# Patient Record
Sex: MALE | Race: WHITE | NOT HISPANIC OR LATINO | Employment: OTHER | URBAN - METROPOLITAN AREA
[De-identification: names, ages, dates, MRNs, and addresses within clinical notes are randomized per-mention and may not be internally consistent; named-entity substitution may affect disease eponyms.]

---

## 2018-01-19 ENCOUNTER — OFFICE VISIT CONVERTED (OUTPATIENT)
Dept: UROLOGY | Facility: CLINIC | Age: 83
End: 2018-01-19
Attending: UROLOGY

## 2018-01-23 ENCOUNTER — CONVERSION ENCOUNTER (OUTPATIENT)
Dept: SURGERY | Facility: CLINIC | Age: 83
End: 2018-01-23

## 2018-01-23 ENCOUNTER — OFFICE VISIT CONVERTED (OUTPATIENT)
Dept: SURGERY | Facility: CLINIC | Age: 83
End: 2018-01-23
Attending: SURGERY

## 2018-02-21 ENCOUNTER — OFFICE VISIT CONVERTED (OUTPATIENT)
Dept: SURGERY | Facility: CLINIC | Age: 83
End: 2018-02-21
Attending: SURGERY

## 2018-07-19 ENCOUNTER — OFFICE VISIT CONVERTED (OUTPATIENT)
Dept: UROLOGY | Facility: CLINIC | Age: 83
End: 2018-07-19
Attending: UROLOGY

## 2018-10-31 ENCOUNTER — CONVERSION ENCOUNTER (OUTPATIENT)
Dept: SURGERY | Facility: CLINIC | Age: 83
End: 2018-10-31

## 2018-10-31 ENCOUNTER — OFFICE VISIT CONVERTED (OUTPATIENT)
Dept: SURGERY | Facility: CLINIC | Age: 83
End: 2018-10-31
Attending: SURGERY

## 2018-11-07 ENCOUNTER — OFFICE VISIT CONVERTED (OUTPATIENT)
Dept: ORTHOPEDIC SURGERY | Facility: CLINIC | Age: 83
End: 2018-11-07
Attending: ORTHOPAEDIC SURGERY

## 2018-12-20 ENCOUNTER — OFFICE VISIT CONVERTED (OUTPATIENT)
Dept: SURGERY | Facility: CLINIC | Age: 83
End: 2018-12-20
Attending: SURGERY

## 2019-01-18 ENCOUNTER — CONVERSION ENCOUNTER (OUTPATIENT)
Dept: OTHER | Facility: HOSPITAL | Age: 84
End: 2019-01-18

## 2019-01-18 ENCOUNTER — OFFICE VISIT CONVERTED (OUTPATIENT)
Dept: OTHER | Facility: HOSPITAL | Age: 84
End: 2019-01-18
Attending: NURSE PRACTITIONER

## 2019-01-21 ENCOUNTER — CONVERSION ENCOUNTER (OUTPATIENT)
Dept: UROLOGY | Facility: CLINIC | Age: 84
End: 2019-01-21

## 2019-01-21 ENCOUNTER — OFFICE VISIT CONVERTED (OUTPATIENT)
Dept: UROLOGY | Facility: CLINIC | Age: 84
End: 2019-01-21
Attending: UROLOGY

## 2019-05-10 ENCOUNTER — HOSPITAL ENCOUNTER (OUTPATIENT)
Dept: OTHER | Facility: HOSPITAL | Age: 84
Discharge: HOME | End: 2019-05-10
Attending: NURSE PRACTITIONER

## 2019-05-10 ENCOUNTER — CONVERSION ENCOUNTER (OUTPATIENT)
Dept: OTHER | Facility: HOSPITAL | Age: 84
End: 2019-05-10

## 2019-05-10 ENCOUNTER — OFFICE VISIT CONVERTED (OUTPATIENT)
Dept: OTHER | Facility: HOSPITAL | Age: 84
End: 2019-05-10
Attending: NURSE PRACTITIONER

## 2019-05-10 LAB
ALBUMIN SERPL-MCNC: 3.6 G/DL (ref 3.5–5)
ALBUMIN/GLOB SERPL: 1.3 {RATIO} (ref 1.4–2.6)
ALP SERPL-CCNC: 95 U/L (ref 56–155)
ALT SERPL-CCNC: 13 U/L (ref 10–40)
ANION GAP SERPL CALC-SCNC: 16 MMOL/L (ref 8–19)
AST SERPL-CCNC: 22 U/L (ref 15–50)
BASOPHILS # BLD AUTO: 0.03 10*3/UL (ref 0–0.2)
BASOPHILS NFR BLD AUTO: 0.5 % (ref 0–3)
BILIRUB SERPL-MCNC: 0.25 MG/DL (ref 0.2–1.3)
BUN SERPL-MCNC: 19 MG/DL (ref 5–25)
BUN/CREAT SERPL: 20 {RATIO} (ref 6–20)
CALCIUM SERPL-MCNC: 9.2 MG/DL (ref 8.7–10.4)
CHLORIDE SERPL-SCNC: 102 MMOL/L (ref 99–111)
CHOLEST SERPL-MCNC: 135 MG/DL (ref 107–200)
CHOLEST/HDLC SERPL: 3 {RATIO} (ref 3–6)
CONV ABS IMM GRAN: 0.02 10*3/UL (ref 0–0.2)
CONV CO2: 24 MMOL/L (ref 22–32)
CONV IMMATURE GRAN: 0.4 % (ref 0–1.8)
CONV TOTAL PROTEIN: 6.3 G/DL (ref 6.3–8.2)
CREAT UR-MCNC: 0.95 MG/DL (ref 0.7–1.2)
DEPRECATED RDW RBC AUTO: 42.5 FL (ref 35.1–43.9)
EOSINOPHIL # BLD AUTO: 0.11 10*3/UL (ref 0–0.7)
EOSINOPHIL # BLD AUTO: 2 % (ref 0–7)
ERYTHROCYTE [DISTWIDTH] IN BLOOD BY AUTOMATED COUNT: 12.3 % (ref 11.6–14.4)
FOLATE SERPL-MCNC: >20 NG/ML (ref 4.8–20)
GFR SERPLBLD BASED ON 1.73 SQ M-ARVRAT: >60 ML/MIN/{1.73_M2}
GLOBULIN UR ELPH-MCNC: 2.7 G/DL (ref 2–3.5)
GLUCOSE SERPL-MCNC: 97 MG/DL (ref 70–99)
HBA1C MFR BLD: 12.4 G/DL (ref 14–18)
HCT VFR BLD AUTO: 37.8 % (ref 42–52)
HDLC SERPL-MCNC: 45 MG/DL (ref 40–60)
LDLC SERPL CALC-MCNC: 78 MG/DL (ref 70–100)
LYMPHOCYTES # BLD AUTO: 1.73 10*3/UL (ref 1–5)
MCH RBC QN AUTO: 31.1 PG (ref 27–31)
MCHC RBC AUTO-ENTMCNC: 32.8 G/DL (ref 33–37)
MCV RBC AUTO: 94.7 FL (ref 80–96)
MONOCYTES # BLD AUTO: 0.41 10*3/UL (ref 0.2–1.2)
MONOCYTES NFR BLD AUTO: 7.3 % (ref 3–10)
NEUTROPHILS # BLD AUTO: 3.32 10*3/UL (ref 2–8)
NEUTROPHILS NFR BLD AUTO: 59 % (ref 30–85)
NRBC CBCN: 0 % (ref 0–0.7)
OSMOLALITY SERPL CALC.SUM OF ELEC: 288 MOSM/KG (ref 273–304)
PLATELET # BLD AUTO: 210 10*3/UL (ref 130–400)
PMV BLD AUTO: 11 FL (ref 9.4–12.4)
POTASSIUM SERPL-SCNC: 4 MMOL/L (ref 3.5–5.3)
PSA SERPL-MCNC: 1.98 NG/ML (ref 0–4)
RBC # BLD AUTO: 3.99 10*6/UL (ref 4.7–6.1)
SODIUM SERPL-SCNC: 138 MMOL/L (ref 135–147)
TRIGL SERPL-MCNC: 62 MG/DL (ref 40–150)
VARIANT LYMPHS NFR BLD MANUAL: 30.8 % (ref 20–45)
VIT B12 SERPL-MCNC: 404 PG/ML (ref 211–911)
VLDLC SERPL-MCNC: 12 MG/DL (ref 5–37)
WBC # BLD AUTO: 5.62 10*3/UL (ref 4.8–10.8)

## 2019-05-14 ENCOUNTER — HOSPITAL ENCOUNTER (OUTPATIENT)
Dept: OTHER | Facility: HOSPITAL | Age: 84
Discharge: HOME | End: 2019-05-14
Attending: NURSE PRACTITIONER

## 2019-05-14 LAB
FERRITIN SERPL-MCNC: 53 NG/ML (ref 30–300)
IRON SATN MFR SERPL: 30 % (ref 20–55)
IRON SERPL-MCNC: 97 UG/DL (ref 70–180)
TIBC SERPL-MCNC: 319 UG/DL (ref 245–450)
TRANSFERRIN SERPL-MCNC: 223 MG/DL (ref 215–365)

## 2020-02-20 ENCOUNTER — OFFICE VISIT (OUTPATIENT)
Dept: CARDIOLOGY | Facility: CLINIC | Age: 85
End: 2020-02-20

## 2020-02-20 ENCOUNTER — HOSPITAL ENCOUNTER (OUTPATIENT)
Dept: LAB | Facility: HOSPITAL | Age: 85
Discharge: HOME | End: 2020-02-20
Attending: PHYSICIAN ASSISTANT

## 2020-02-20 VITALS
HEART RATE: 70 BPM | DIASTOLIC BLOOD PRESSURE: 72 MMHG | BODY MASS INDEX: 23.52 KG/M2 | HEIGHT: 71 IN | WEIGHT: 168 LBS | OXYGEN SATURATION: 99 % | SYSTOLIC BLOOD PRESSURE: 140 MMHG

## 2020-02-20 DIAGNOSIS — R06.89 RESPIRATORY INSUFFICIENCY: ICD-10-CM

## 2020-02-20 DIAGNOSIS — I10 ESSENTIAL HYPERTENSION: Primary | ICD-10-CM

## 2020-02-20 DIAGNOSIS — R60.0 BILATERAL LOWER EXTREMITY EDEMA: ICD-10-CM

## 2020-02-20 LAB
25(OH)D3 SERPL-MCNC: 36.3 NG/ML (ref 30–100)
ALBUMIN SERPL-MCNC: 3.6 G/DL (ref 3.5–5)
ALBUMIN/GLOB SERPL: 1.3 {RATIO} (ref 1.4–2.6)
ALP SERPL-CCNC: 107 U/L (ref 56–155)
ALT SERPL-CCNC: 13 U/L (ref 10–40)
ANION GAP SERPL CALC-SCNC: 18 MMOL/L (ref 8–19)
AST SERPL-CCNC: 23 U/L (ref 15–50)
BASOPHILS # BLD AUTO: 0.04 10*3/UL (ref 0–0.2)
BASOPHILS NFR BLD AUTO: 0.7 % (ref 0–3)
BILIRUB SERPL-MCNC: 0.31 MG/DL (ref 0.2–1.3)
BUN SERPL-MCNC: 22 MG/DL (ref 5–25)
BUN/CREAT SERPL: 22 {RATIO} (ref 6–20)
CALCIUM SERPL-MCNC: 9.3 MG/DL (ref 8.7–10.4)
CHLORIDE SERPL-SCNC: 101 MMOL/L (ref 99–111)
CHOLEST SERPL-MCNC: 160 MG/DL (ref 107–200)
CHOLEST/HDLC SERPL: 3.4 {RATIO} (ref 3–6)
CONV ABS IMM GRAN: 0.02 10*3/UL (ref 0–0.2)
CONV CO2: 26 MMOL/L (ref 22–32)
CONV IMMATURE GRAN: 0.4 % (ref 0–1.8)
CONV TOTAL PROTEIN: 6.4 G/DL (ref 6.3–8.2)
CREAT UR-MCNC: 1 MG/DL (ref 0.7–1.2)
DEPRECATED RDW RBC AUTO: 43.7 FL (ref 35.1–43.9)
EOSINOPHIL # BLD AUTO: 0.23 10*3/UL (ref 0–0.7)
EOSINOPHIL # BLD AUTO: 4.3 % (ref 0–7)
ERYTHROCYTE [DISTWIDTH] IN BLOOD BY AUTOMATED COUNT: 12.7 % (ref 11.6–14.4)
GFR SERPLBLD BASED ON 1.73 SQ M-ARVRAT: >60 ML/MIN/{1.73_M2}
GLOBULIN UR ELPH-MCNC: 2.8 G/DL (ref 2–3.5)
GLUCOSE SERPL-MCNC: 103 MG/DL (ref 70–99)
HCT VFR BLD AUTO: 40.7 % (ref 42–52)
HDLC SERPL-MCNC: 47 MG/DL (ref 40–60)
HGB BLD-MCNC: 13.1 G/DL (ref 14–18)
LDLC SERPL CALC-MCNC: 106 MG/DL (ref 70–100)
LYMPHOCYTES # BLD AUTO: 1.56 10*3/UL (ref 1–5)
LYMPHOCYTES NFR BLD AUTO: 29 % (ref 20–45)
MCH RBC QN AUTO: 30.6 PG (ref 27–31)
MCHC RBC AUTO-ENTMCNC: 32.2 G/DL (ref 33–37)
MCV RBC AUTO: 95.1 FL (ref 80–96)
MONOCYTES # BLD AUTO: 0.44 10*3/UL (ref 0.2–1.2)
MONOCYTES NFR BLD AUTO: 8.2 % (ref 3–10)
NEUTROPHILS # BLD AUTO: 3.09 10*3/UL (ref 2–8)
NEUTROPHILS NFR BLD AUTO: 57.4 % (ref 30–85)
NRBC CBCN: 0 % (ref 0–0.7)
OSMOLALITY SERPL CALC.SUM OF ELEC: 296 MOSM/KG (ref 273–304)
PLATELET # BLD AUTO: 197 10*3/UL (ref 130–400)
PMV BLD AUTO: 10.6 FL (ref 9.4–12.4)
POTASSIUM SERPL-SCNC: 4 MMOL/L (ref 3.5–5.3)
RBC # BLD AUTO: 4.28 10*6/UL (ref 4.7–6.1)
SODIUM SERPL-SCNC: 141 MMOL/L (ref 135–147)
T4 FREE SERPL-MCNC: 1 NG/DL (ref 0.9–1.8)
TRIGL SERPL-MCNC: 33 MG/DL (ref 40–150)
TSH SERPL-ACNC: 7.16 M[IU]/L (ref 0.27–4.2)
VLDLC SERPL-MCNC: 7 MG/DL (ref 5–37)
WBC # BLD AUTO: 5.38 10*3/UL (ref 4.8–10.8)

## 2020-02-20 PROCEDURE — 99203 OFFICE O/P NEW LOW 30 MIN: CPT | Performed by: INTERNAL MEDICINE

## 2020-02-20 RX ORDER — HYDROCHLOROTHIAZIDE 25 MG/1
TABLET ORAL
COMMUNITY
Start: 2019-12-23 | End: 2021-07-19 | Stop reason: SDUPTHER

## 2020-02-20 RX ORDER — MELOXICAM 7.5 MG/1
7.5 TABLET ORAL DAILY PRN
COMMUNITY
Start: 2019-11-25 | End: 2021-08-10 | Stop reason: SDUPTHER

## 2020-02-20 NOTE — PROGRESS NOTES
[facility]        Subjective:     Encounter Date:02/20/2020      Patient ID: Payam Owens is a 85 y.o. male.    Chief Complaint:  Chief Complaint   Patient presents with   • Foot Swelling     NP - left ankle       HPI: I saw Payam Owens today for cardiovascular evaluation.  He is a very pleasant 85-year-old male.  He does not report any prior known cardiac disease history.  He has a good activity level which is remained stable.  He exercises aerobically on a routine basis.  He does not experience chest pain pressure or tightness.  He does report dyspnea on exertion but he feels that this is also remained stable.  He is free of orthopnea or PND.  He is noticed however lower extremity edema left greater than right.  He denies syncope near syncope or palpitation.  Mr. Owens does not report any known injury or surgery to his lower extremities.  Cardiac risk factors are positive for hypertension negative for diabetes dyslipidemia tobacco use or family history of premature coronary heart disease.      The following portions of the patient's history were reviewed and updated as appropriate: allergies, current medications, past family history, past medical history, past social history, past surgical history and problem list.    Problem List:  There is no problem list on file for this patient.      Past Medical History:  No past medical history on file.    Past Surgical History:  No past surgical history on file.    Social History:  Social History     Socioeconomic History   • Marital status: Unknown     Spouse name: Not on file   • Number of children: Not on file   • Years of education: Not on file   • Highest education level: Not on file   Tobacco Use   • Smoking status: Never Smoker   Substance and Sexual Activity   • Alcohol use: Yes     Frequency: Monthly or less       Allergies:  No Known Allergies      ROS:  Review of Systems   Constitution: Negative for malaise/fatigue.   HENT: Negative for  "hearing loss and nosebleeds.    Eyes: Negative for double vision and visual disturbance.   Cardiovascular: Positive for dyspnea on exertion and leg swelling. Negative for chest pain, claudication, near-syncope, orthopnea, palpitations, paroxysmal nocturnal dyspnea and syncope.   Respiratory: Negative for cough, shortness of breath, sleep disturbances due to breathing, snoring, sputum production and wheezing.    Endocrine: Negative for cold intolerance, heat intolerance, polydipsia and polyuria.   Hematologic/Lymphatic: Negative for adenopathy and bleeding problem. Does not bruise/bleed easily.   Skin: Negative for flushing and itching.   Musculoskeletal: Negative for back pain, falls, joint pain, joint swelling, muscle weakness and neck pain.   Gastrointestinal: Negative for abdominal pain, dysphagia, heartburn, nausea and vomiting.   Genitourinary: Negative for dysuria and frequency.   Neurological: Negative for disturbances in coordination, dizziness, light-headedness, loss of balance, numbness and weakness.   Psychiatric/Behavioral: Negative for altered mental status and memory loss. The patient is not nervous/anxious.    Allergic/Immunologic: Negative for hives and persistent infections.          Objective:         /72 (BP Location: Left arm, Patient Position: Sitting, Cuff Size: Adult)   Pulse 70   Ht 179.1 cm (70.5\")   Wt 76.2 kg (168 lb)   SpO2 99%   BMI 23.76 kg/m²     Physical Exam   Constitutional: He is oriented to person, place, and time. He appears well-developed and well-nourished.   HENT:   Head: Normocephalic and atraumatic.   Eyes: Pupils are equal, round, and reactive to light. Conjunctivae and EOM are normal.   Neck: Neck supple. No thyromegaly present.   Cardiovascular: Normal rate, regular rhythm, S1 normal, S2 normal and intact distal pulses. PMI is not displaced. Exam reveals gallop and S4. Exam reveals no S3, no distant heart sounds, no friction rub, no midsystolic click and no " opening snap.   No murmur heard.  Pulses:       Carotid pulses are 2+ on the right side, and 2+ on the left side.       Radial pulses are 2+ on the right side, and 2+ on the left side.        Femoral pulses are 2+ on the right side, and 2+ on the left side.       Dorsalis pedis pulses are 2+ on the right side, and 2+ on the left side.        Posterior tibial pulses are 2+ on the right side, and 2+ on the left side.   Pulmonary/Chest: Effort normal and breath sounds normal. No respiratory distress. He has no wheezes. He has no rales.   Abdominal: Soft. Bowel sounds are normal. He exhibits no distension and no mass. There is no tenderness.   Musculoskeletal: Normal range of motion. He exhibits no edema.   Trace to 1+ edema left greater than right lower extremity   Neurological: He is alert and oriented to person, place, and time.   Skin: Skin is warm and dry. No erythema.   Psychiatric: He has a normal mood and affect.       In-Office Procedure(s):  Procedures    ASCVD RIsk Score::  The ASCVD Risk score (Redding DC Jr., et al., 2013) failed to calculate for the following reasons:    The 2013 ASCVD risk score is only valid for ages 40 to 79    Recent Radiology:  Imaging Results (Most Recent)     None                Assessment/Plan:         1. Essential hypertension  Target less than 130/80    2. Respiratory insufficiency  Multifactorial including age weight deconditioning hypertension    3. Bilateral lower extremity edema  Persistent    I recommended target blood pressure of less than 130/80.  I have discussed the importance of salt restriction is close to 2000 mg a day as possible.  I will obtain an echocardiogram to assess cardiac chamber size, wall thickness, contractility, possible diastolic dysfunction and valvular function.  I have encouraged Mr. Owens to maintain his exercise program.           Collin Harvey MD  02/20/20  .

## 2020-05-29 ENCOUNTER — HOSPITAL ENCOUNTER (OUTPATIENT)
Dept: CARDIOLOGY | Facility: HOSPITAL | Age: 85
Discharge: HOME | End: 2020-05-29
Admitting: INTERNAL MEDICINE

## 2020-05-29 VITALS
HEIGHT: 70 IN | DIASTOLIC BLOOD PRESSURE: 88 MMHG | SYSTOLIC BLOOD PRESSURE: 170 MMHG | BODY MASS INDEX: 24.05 KG/M2 | WEIGHT: 168 LBS

## 2020-05-29 DIAGNOSIS — R60.0 BILATERAL LOWER EXTREMITY EDEMA: ICD-10-CM

## 2020-05-29 DIAGNOSIS — I10 ESSENTIAL HYPERTENSION: ICD-10-CM

## 2020-05-29 DIAGNOSIS — R06.89 RESPIRATORY INSUFFICIENCY: ICD-10-CM

## 2020-05-29 PROCEDURE — 93306 TTE W/DOPPLER COMPLETE: CPT

## 2020-05-29 PROCEDURE — 93306 TTE W/DOPPLER COMPLETE: CPT | Performed by: INTERNAL MEDICINE

## 2020-06-01 LAB
AORTIC DIMENSIONLESS INDEX: 0.6 (DI)
BH CV ECHO MEAS - ACS: 1.7 CM
BH CV ECHO MEAS - AO MAX PG: 8 MMHG
BH CV ECHO MEAS - AO MEAN PG (FULL): 3 MMHG
BH CV ECHO MEAS - AO MEAN PG: 4 MMHG
BH CV ECHO MEAS - AO V2 MAX: 143 CM/SEC
BH CV ECHO MEAS - AO V2 MEAN: 86.9 CM/SEC
BH CV ECHO MEAS - AO V2 VTI: 28.9 CM
BH CV ECHO MEAS - AVA(I,A): 1.9 CM^2
BH CV ECHO MEAS - AVA(I,D): 1.9 CM^2
BH CV ECHO MEAS - BSA(HAYCOCK): 1.9 M^2
BH CV ECHO MEAS - BSA: 1.9 M^2
BH CV ECHO MEAS - BZI_BMI: 24.1 KILOGRAMS/M^2
BH CV ECHO MEAS - BZI_METRIC_HEIGHT: 177.8 CM
BH CV ECHO MEAS - BZI_METRIC_WEIGHT: 76.2 KG
BH CV ECHO MEAS - EDV(CUBED): 103.8 ML
BH CV ECHO MEAS - EDV(MOD-SP2): 102 ML
BH CV ECHO MEAS - EDV(MOD-SP4): 100 ML
BH CV ECHO MEAS - EDV(TEICH): 102.4 ML
BH CV ECHO MEAS - EF(CUBED): 74 %
BH CV ECHO MEAS - EF(MOD-BP): 66 %
BH CV ECHO MEAS - EF(MOD-SP2): 67.6 %
BH CV ECHO MEAS - EF(MOD-SP4): 69 %
BH CV ECHO MEAS - EF(TEICH): 65.8 %
BH CV ECHO MEAS - ESV(CUBED): 27 ML
BH CV ECHO MEAS - ESV(MOD-SP2): 33 ML
BH CV ECHO MEAS - ESV(MOD-SP4): 31 ML
BH CV ECHO MEAS - ESV(TEICH): 35 ML
BH CV ECHO MEAS - FS: 36.2 %
BH CV ECHO MEAS - IVS/LVPW: 1.2
BH CV ECHO MEAS - IVSD: 1.1 CM
BH CV ECHO MEAS - LAT PEAK E' VEL: 8 CM/SEC
BH CV ECHO MEAS - LV DIASTOLIC VOL/BSA (35-75): 51.6 ML/M^2
BH CV ECHO MEAS - LV MASS(C)D: 164.5 GRAMS
BH CV ECHO MEAS - LV MASS(C)DI: 84.8 GRAMS/M^2
BH CV ECHO MEAS - LV MAX PG: 2 MMHG
BH CV ECHO MEAS - LV MEAN PG: 1 MMHG
BH CV ECHO MEAS - LV SYSTOLIC VOL/BSA (12-30): 16 ML/M^2
BH CV ECHO MEAS - LV V1 MAX: 69 CM/SEC
BH CV ECHO MEAS - LV V1 MEAN: 49.3 CM/SEC
BH CV ECHO MEAS - LV V1 VTI: 17.2 CM
BH CV ECHO MEAS - LVIDD: 4.7 CM
BH CV ECHO MEAS - LVIDS: 3 CM
BH CV ECHO MEAS - LVLD AP2: 6.7 CM
BH CV ECHO MEAS - LVLD AP4: 6.6 CM
BH CV ECHO MEAS - LVLS AP2: 5.9 CM
BH CV ECHO MEAS - LVLS AP4: 5.2 CM
BH CV ECHO MEAS - LVOT AREA (M): 3.1 CM^2
BH CV ECHO MEAS - LVOT AREA: 3.1 CM^2
BH CV ECHO MEAS - LVOT DIAM: 2 CM
BH CV ECHO MEAS - LVPWD: 0.9 CM
BH CV ECHO MEAS - MED PEAK E' VEL: 7 CM/SEC
BH CV ECHO MEAS - MR MAX PG: 84.3 MMHG
BH CV ECHO MEAS - MR MAX VEL: 459 CM/SEC
BH CV ECHO MEAS - MV A DUR: 0.08 SEC
BH CV ECHO MEAS - MV A MAX VEL: 87.9 CM/SEC
BH CV ECHO MEAS - MV DEC SLOPE: 306 CM/SEC^2
BH CV ECHO MEAS - MV DEC TIME: 290 SEC
BH CV ECHO MEAS - MV E MAX VEL: 58.7 CM/SEC
BH CV ECHO MEAS - MV E/A: 0.67
BH CV ECHO MEAS - MV MEAN PG: 2 MMHG
BH CV ECHO MEAS - MV P1/2T MAX VEL: 85.3 CM/SEC
BH CV ECHO MEAS - MV P1/2T: 81.6 MSEC
BH CV ECHO MEAS - MV V2 MEAN: 60.4 CM/SEC
BH CV ECHO MEAS - MV V2 VTI: 24.6 CM
BH CV ECHO MEAS - MVA P1/2T LCG: 2.6 CM^2
BH CV ECHO MEAS - MVA(P1/2T): 2.7 CM^2
BH CV ECHO MEAS - MVA(VTI): 2.2 CM^2
BH CV ECHO MEAS - PA ACC SLOPE: 1126 CM/SEC^2
BH CV ECHO MEAS - PA ACC TIME: 0.08 SEC
BH CV ECHO MEAS - PA MAX PG: 3.3 MMHG
BH CV ECHO MEAS - PA PR(ACCEL): 43.5 MMHG
BH CV ECHO MEAS - PA V2 MAX: 90.2 CM/SEC
BH CV ECHO MEAS - RAP SYSTOLE: 3 MMHG
BH CV ECHO MEAS - RV MEAN PG: 1 MMHG
BH CV ECHO MEAS - RV V1 MEAN: 34.6 CM/SEC
BH CV ECHO MEAS - RV V1 VTI: 10.4 CM
BH CV ECHO MEAS - RVSP: 30.2 MMHG
BH CV ECHO MEAS - SI(CUBED): 39.6 ML/M^2
BH CV ECHO MEAS - SI(LVOT): 27.9 ML/M^2
BH CV ECHO MEAS - SI(MOD-SP2): 35.6 ML/M^2
BH CV ECHO MEAS - SI(MOD-SP4): 35.6 ML/M^2
BH CV ECHO MEAS - SI(TEICH): 34.8 ML/M^2
BH CV ECHO MEAS - SV(CUBED): 76.8 ML
BH CV ECHO MEAS - SV(LVOT): 54 ML
BH CV ECHO MEAS - SV(MOD-SP2): 69 ML
BH CV ECHO MEAS - SV(MOD-SP4): 69 ML
BH CV ECHO MEAS - SV(TEICH): 67.4 ML
BH CV ECHO MEAS - TAPSE (>1.6): 2.5 CM2
BH CV ECHO MEAS - TR MAX VEL: 261 CM/SEC
BH CV ECHO MEASUREMENTS AVERAGE E/E' RATIO: 7.83
BH CV VAS BP RIGHT ARM: NORMAL MMHG
BH CV XLRA - RV BASE: 3 CM
BH CV XLRA - RV LENGTH: 6 CM
BH CV XLRA - RV MID: 2 CM
BH CV XLRA - TDI S': 8 CM/SEC
LEFT ATRIUM VOLUME INDEX: 17 ML/M2
MAXIMAL PREDICTED HEART RATE: 137 BPM
STRESS TARGET HR: 116 BPM

## 2020-06-08 PROBLEM — I10 ESSENTIAL HYPERTENSION: Status: ACTIVE | Noted: 2020-06-08

## 2020-06-10 ENCOUNTER — TELEMEDICINE (OUTPATIENT)
Dept: CARDIOLOGY | Facility: CLINIC | Age: 85
End: 2020-06-10

## 2020-06-10 VITALS
BODY MASS INDEX: 23.48 KG/M2 | WEIGHT: 164 LBS | DIASTOLIC BLOOD PRESSURE: 85 MMHG | HEART RATE: 67 BPM | HEIGHT: 70 IN | SYSTOLIC BLOOD PRESSURE: 128 MMHG

## 2020-06-10 DIAGNOSIS — I10 ESSENTIAL HYPERTENSION: ICD-10-CM

## 2020-06-10 DIAGNOSIS — R60.0 BILATERAL LOWER EXTREMITY EDEMA: ICD-10-CM

## 2020-06-10 DIAGNOSIS — I35.0 AORTIC STENOSIS, MODERATE: Primary | ICD-10-CM

## 2020-06-10 DIAGNOSIS — R06.89 RESPIRATORY INSUFFICIENCY: ICD-10-CM

## 2020-06-10 DIAGNOSIS — Z01.818 PRE-OP TESTING: ICD-10-CM

## 2020-06-10 PROCEDURE — 99215 OFFICE O/P EST HI 40 MIN: CPT | Performed by: INTERNAL MEDICINE

## 2020-06-10 RX ORDER — FERROUS SULFATE 325(65) MG
325 TABLET ORAL AS NEEDED
COMMUNITY
End: 2021-05-20

## 2020-06-11 NOTE — PROGRESS NOTES
[facility]    You have chosen to receive care through a telehealth visit.  Do you consent to use a video/audio connection for your medical care today? Yes    Subjective:     Encounter Date:06/10/2020      Patient ID: Payam Owens is a 85 y.o. male.      HPI: Nathaly Owens agreed to a video visit today secondary to the coronavirus pandemic.  He remains free of fever cough but does report increased dyspnea on exertion.  He denies any change in his sense of smell or taste.  Mr. Owens is a pleasant 85-year-old male who reports increased dyspnea on exertion.  He has demonstrated lower extremity edema.  Obtain an echocardiogram on 5/29/2020 which demonstrated left ventricular ejection fraction 66%, borderline concentric left ventricular hypertrophy, grade 1 diastolic dysfunction.  There was moderate calcification of the aortic valve which was not well visualized.  Doppler interrogation revealed at least trace to mild aortic insufficiency as well as mild to moderate aortic stenosis.  Visually the aortic valve appears to be consistent with moderate to severe aortic stenosis.  There was mild mitral tricuspid and pulmonic insufficiency noted.  Mr. Owens denies chest pain pressure or tightness.  He does not experience orthopnea or PND.  He is remained free of syncope near syncope or palpitation.  His lower extremity edema persists left greater than right.      The following portions of the patient's history were reviewed and updated as appropriate: allergies, current medications, past family history, past medical history, past social history, past surgical history and problem list.    Problem List:  Patient Active Problem List   Diagnosis   • Essential hypertension       Past Medical History:  Past Medical History:   Diagnosis Date   • Edema    • Essential hypertension 6/8/2020   • History of echocardiogram 05/29/2020    EF = 66.0%, borderline concentric hypertrophy, diastolic dysfunction (grade I),  moderate calcification of the aortic valve, moderate calcification of the aortic valve.Trace-to-mild aortic valve regurgitation, mild to moderate aortic stenosis, Mild MR/TR/IL       Past Surgical History:  Past Surgical History:   Procedure Laterality Date   • BACK SURGERY         Social History:  Social History     Socioeconomic History   • Marital status:      Spouse name: Not on file   • Number of children: Not on file   • Years of education: Not on file   • Highest education level: Not on file   Tobacco Use   • Smoking status: Never Smoker   • Smokeless tobacco: Never Used   Substance and Sexual Activity   • Alcohol use: Yes     Frequency: Monthly or less   • Drug use: Never   • Sexual activity: Defer       Allergies:  No Known Allergies      ROS:  Review of Systems   Constitution: Negative for malaise/fatigue.   HENT: Negative for hearing loss and nosebleeds.    Eyes: Negative for double vision and visual disturbance.   Cardiovascular: Positive for dyspnea on exertion and leg swelling. Negative for chest pain, claudication, near-syncope, orthopnea, palpitations, paroxysmal nocturnal dyspnea and syncope.   Respiratory: Negative for cough, shortness of breath, sleep disturbances due to breathing, snoring, sputum production and wheezing.    Endocrine: Negative for cold intolerance, heat intolerance, polydipsia and polyuria.   Hematologic/Lymphatic: Negative for adenopathy and bleeding problem. Does not bruise/bleed easily.   Skin: Negative for flushing and itching.   Musculoskeletal: Negative for back pain, falls, joint pain, joint swelling, muscle weakness and neck pain.   Gastrointestinal: Negative for abdominal pain, dysphagia, heartburn, nausea and vomiting.   Genitourinary: Negative for dysuria and frequency.   Neurological: Negative for disturbances in coordination, dizziness, light-headedness, loss of balance, numbness and weakness.   Psychiatric/Behavioral: Negative for altered mental status and  "memory loss. The patient is not nervous/anxious.    Allergic/Immunologic: Negative for hives and persistent infections.          Objective:         /85   Pulse 67   Ht 177.8 cm (70\")   Wt 74.4 kg (164 lb)   BMI 23.53 kg/m²     Physical Exam not performed    In-Office Procedure(s):  Procedures    ASCVD RIsk Score::  The ASCVD Risk score (Sharita PATRICK Jr., et al., 2013) failed to calculate for the following reasons:    The 2013 ASCVD risk score is only valid for ages 40 to 79        Assessment/Plan:         1. Aortic stenosis, moderate  Visually on echocardiogram appears to be moderate to severe  - Case Request Cath Lab: Right and Left Heart Cath  - Protime-INR; Future    2. Essential hypertension  Controlled    3. Respiratory insufficiency  Progressive respiratory insufficiency    4. Bilateral lower extremity edema  Continued persistent bilateral lower extremity edema.        I counseled Mr. Owens on restricting salt intake.  I have recommended he use compression stockings.  I reviewed with him the findings of his echocardiogram and given his progressive respiratory insufficiency and lower extremity edema I feel we should proceed to further evaluation of the severity of his aortic stenosis with right and left heart catheterization.  Risks benefits been explained he understands and wishes to proceed.  Also discussed the potential need for aortic valve replacement and reviewed with him open aortic valve replacement versus transcatheter aortic valve replacement.    I spent 35 minutes on video visit and additional 15 minutes completing electronic medical record documentation.       Collin Harvey MD  06/10/20  .    "

## 2020-06-18 PROBLEM — I35.0 AORTIC STENOSIS, MODERATE: Status: ACTIVE | Noted: 2020-06-18

## 2020-07-14 ENCOUNTER — LAB (OUTPATIENT)
Dept: LAB | Facility: HOSPITAL | Age: 85
End: 2020-07-14

## 2020-07-14 DIAGNOSIS — I35.0 AORTIC STENOSIS, MODERATE: ICD-10-CM

## 2020-07-14 DIAGNOSIS — Z01.818 PRE-OP TESTING: ICD-10-CM

## 2020-07-14 LAB
ALBUMIN SERPL-MCNC: 3.4 G/DL (ref 3.5–5.2)
ALBUMIN/GLOB SERPL: 1.1 G/DL
ALP SERPL-CCNC: 92 U/L (ref 39–117)
ALT SERPL W P-5'-P-CCNC: 15 U/L (ref 1–41)
ANION GAP SERPL CALCULATED.3IONS-SCNC: 8.4 MMOL/L (ref 5–15)
AST SERPL-CCNC: 22 U/L (ref 1–40)
BASOPHILS # BLD AUTO: 0.05 10*3/MM3 (ref 0–0.2)
BASOPHILS NFR BLD AUTO: 0.7 % (ref 0–1.5)
BILIRUB SERPL-MCNC: 0.3 MG/DL (ref 0–1.2)
BUN SERPL-MCNC: 22 MG/DL (ref 8–23)
BUN/CREAT SERPL: 23.9 (ref 7–25)
CALCIUM SPEC-SCNC: 9.2 MG/DL (ref 8.6–10.5)
CHLORIDE SERPL-SCNC: 106 MMOL/L (ref 98–107)
CO2 SERPL-SCNC: 27.6 MMOL/L (ref 22–29)
CREAT SERPL-MCNC: 0.92 MG/DL (ref 0.76–1.27)
DEPRECATED RDW RBC AUTO: 40.4 FL (ref 37–54)
EOSINOPHIL # BLD AUTO: 0.23 10*3/MM3 (ref 0–0.4)
EOSINOPHIL NFR BLD AUTO: 3.3 % (ref 0.3–6.2)
ERYTHROCYTE [DISTWIDTH] IN BLOOD BY AUTOMATED COUNT: 12.1 % (ref 12.3–15.4)
GFR SERPL CREATININE-BSD FRML MDRD: 79 ML/MIN/1.73
GLOBULIN UR ELPH-MCNC: 3 GM/DL
GLUCOSE SERPL-MCNC: 109 MG/DL (ref 65–99)
HCT VFR BLD AUTO: 39 % (ref 37.5–51)
HGB BLD-MCNC: 13.3 G/DL (ref 13–17.7)
IMM GRANULOCYTES # BLD AUTO: 0.02 10*3/MM3 (ref 0–0.05)
IMM GRANULOCYTES NFR BLD AUTO: 0.3 % (ref 0–0.5)
INR PPP: 1.02 (ref 0.9–1.1)
LYMPHOCYTES # BLD AUTO: 1.9 10*3/MM3 (ref 0.7–3.1)
LYMPHOCYTES NFR BLD AUTO: 27.4 % (ref 19.6–45.3)
MCH RBC QN AUTO: 30.9 PG (ref 26.6–33)
MCHC RBC AUTO-ENTMCNC: 34.1 G/DL (ref 31.5–35.7)
MCV RBC AUTO: 90.5 FL (ref 79–97)
MONOCYTES # BLD AUTO: 0.43 10*3/MM3 (ref 0.1–0.9)
MONOCYTES NFR BLD AUTO: 6.2 % (ref 5–12)
NEUTROPHILS NFR BLD AUTO: 4.31 10*3/MM3 (ref 1.7–7)
NEUTROPHILS NFR BLD AUTO: 62.1 % (ref 42.7–76)
NRBC BLD AUTO-RTO: 0 /100 WBC (ref 0–0.2)
PLATELET # BLD AUTO: 189 10*3/MM3 (ref 140–450)
PMV BLD AUTO: 10 FL (ref 6–12)
POTASSIUM SERPL-SCNC: 4 MMOL/L (ref 3.5–5.2)
PROT SERPL-MCNC: 6.4 G/DL (ref 6–8.5)
PROTHROMBIN TIME: 13.3 SECONDS (ref 11.7–14.2)
RBC # BLD AUTO: 4.31 10*6/MM3 (ref 4.14–5.8)
SODIUM SERPL-SCNC: 142 MMOL/L (ref 136–145)
WBC # BLD AUTO: 6.94 10*3/MM3 (ref 3.4–10.8)

## 2020-07-14 PROCEDURE — 85610 PROTHROMBIN TIME: CPT

## 2020-07-14 PROCEDURE — 36415 COLL VENOUS BLD VENIPUNCTURE: CPT

## 2020-07-14 PROCEDURE — 80053 COMPREHEN METABOLIC PANEL: CPT

## 2020-07-14 PROCEDURE — 85025 COMPLETE CBC W/AUTO DIFF WBC: CPT

## 2020-07-15 ENCOUNTER — TRANSCRIBE ORDERS (OUTPATIENT)
Dept: SLEEP MEDICINE | Facility: HOSPITAL | Age: 85
End: 2020-07-15

## 2020-07-15 DIAGNOSIS — Z01.818 OTHER SPECIFIED PRE-OPERATIVE EXAMINATION: Primary | ICD-10-CM

## 2020-07-18 ENCOUNTER — LAB (OUTPATIENT)
Dept: LAB | Facility: HOSPITAL | Age: 85
End: 2020-07-18

## 2020-07-18 DIAGNOSIS — Z01.818 OTHER SPECIFIED PRE-OPERATIVE EXAMINATION: ICD-10-CM

## 2020-07-18 PROCEDURE — C9803 HOPD COVID-19 SPEC COLLECT: HCPCS

## 2020-07-18 PROCEDURE — U0004 COV-19 TEST NON-CDC HGH THRU: HCPCS

## 2020-07-20 LAB
REF LAB TEST METHOD: NORMAL
SARS-COV-2 RNA RESP QL NAA+PROBE: NOT DETECTED

## 2020-07-21 ENCOUNTER — HOSPITAL ENCOUNTER (OUTPATIENT)
Facility: HOSPITAL | Age: 85
Setting detail: HOSPITAL OUTPATIENT SURGERY
Discharge: HOME | End: 2020-07-21
Attending: INTERNAL MEDICINE | Admitting: INTERNAL MEDICINE

## 2020-07-21 VITALS
RESPIRATION RATE: 16 BRPM | HEART RATE: 49 BPM | DIASTOLIC BLOOD PRESSURE: 63 MMHG | SYSTOLIC BLOOD PRESSURE: 122 MMHG | BODY MASS INDEX: 21.98 KG/M2 | WEIGHT: 162.31 LBS | TEMPERATURE: 97.6 F | OXYGEN SATURATION: 98 % | HEIGHT: 72 IN

## 2020-07-21 DIAGNOSIS — I35.0 AORTIC STENOSIS, MODERATE: ICD-10-CM

## 2020-07-21 PROBLEM — R06.89 RESPIRATORY INSUFFICIENCY: Status: ACTIVE | Noted: 2020-07-21

## 2020-07-21 PROBLEM — R60.0 BILATERAL LOWER EXTREMITY EDEMA: Status: ACTIVE | Noted: 2020-07-21

## 2020-07-21 PROCEDURE — C1769 GUIDE WIRE: HCPCS | Performed by: INTERNAL MEDICINE

## 2020-07-21 PROCEDURE — 25010000002 HEPARIN (PORCINE) PER 1000 UNITS: Performed by: INTERNAL MEDICINE

## 2020-07-21 PROCEDURE — C1894 INTRO/SHEATH, NON-LASER: HCPCS | Performed by: INTERNAL MEDICINE

## 2020-07-21 PROCEDURE — 0 IOPAMIDOL PER 1 ML: Performed by: INTERNAL MEDICINE

## 2020-07-21 PROCEDURE — 25010000002 MIDAZOLAM PER 1 MG: Performed by: INTERNAL MEDICINE

## 2020-07-21 PROCEDURE — C1887 CATHETER, GUIDING: HCPCS | Performed by: INTERNAL MEDICINE

## 2020-07-21 PROCEDURE — 99153 MOD SED SAME PHYS/QHP EA: CPT | Performed by: INTERNAL MEDICINE

## 2020-07-21 PROCEDURE — 85014 HEMATOCRIT: CPT

## 2020-07-21 PROCEDURE — 85018 HEMOGLOBIN: CPT

## 2020-07-21 PROCEDURE — 99152 MOD SED SAME PHYS/QHP 5/>YRS: CPT | Performed by: INTERNAL MEDICINE

## 2020-07-21 PROCEDURE — 25010000002 FENTANYL CITRATE (PF) 100 MCG/2ML SOLUTION: Performed by: INTERNAL MEDICINE

## 2020-07-21 PROCEDURE — 93460 R&L HRT ART/VENTRICLE ANGIO: CPT | Performed by: INTERNAL MEDICINE

## 2020-07-21 RX ORDER — MIDAZOLAM HYDROCHLORIDE 1 MG/ML
INJECTION INTRAMUSCULAR; INTRAVENOUS AS NEEDED
Status: DISCONTINUED | OUTPATIENT
Start: 2020-07-21 | End: 2020-07-21 | Stop reason: HOSPADM

## 2020-07-21 RX ORDER — LIDOCAINE HYDROCHLORIDE 10 MG/ML
0.1 INJECTION, SOLUTION EPIDURAL; INFILTRATION; INTRACAUDAL; PERINEURAL ONCE AS NEEDED
Status: DISCONTINUED | OUTPATIENT
Start: 2020-07-21 | End: 2020-07-21 | Stop reason: HOSPADM

## 2020-07-21 RX ORDER — SODIUM CHLORIDE 9 MG/ML
75 INJECTION, SOLUTION INTRAVENOUS CONTINUOUS
Status: DISCONTINUED | OUTPATIENT
Start: 2020-07-21 | End: 2020-07-21 | Stop reason: HOSPADM

## 2020-07-21 RX ORDER — SODIUM CHLORIDE 0.9 % (FLUSH) 0.9 %
10 SYRINGE (ML) INJECTION AS NEEDED
Status: DISCONTINUED | OUTPATIENT
Start: 2020-07-21 | End: 2020-07-21 | Stop reason: HOSPADM

## 2020-07-21 RX ORDER — ACETAMINOPHEN 325 MG/1
650 TABLET ORAL EVERY 4 HOURS PRN
Status: DISCONTINUED | OUTPATIENT
Start: 2020-07-21 | End: 2020-07-21 | Stop reason: HOSPADM

## 2020-07-21 RX ORDER — LIDOCAINE HYDROCHLORIDE 20 MG/ML
INJECTION, SOLUTION INFILTRATION; PERINEURAL AS NEEDED
Status: DISCONTINUED | OUTPATIENT
Start: 2020-07-21 | End: 2020-07-21 | Stop reason: HOSPADM

## 2020-07-21 RX ORDER — FENTANYL CITRATE 50 UG/ML
INJECTION, SOLUTION INTRAMUSCULAR; INTRAVENOUS AS NEEDED
Status: DISCONTINUED | OUTPATIENT
Start: 2020-07-21 | End: 2020-07-21 | Stop reason: HOSPADM

## 2020-07-21 RX ORDER — SODIUM CHLORIDE 0.9 % (FLUSH) 0.9 %
3 SYRINGE (ML) INJECTION EVERY 12 HOURS SCHEDULED
Status: DISCONTINUED | OUTPATIENT
Start: 2020-07-21 | End: 2020-07-21 | Stop reason: HOSPADM

## 2020-07-21 RX ADMIN — SODIUM CHLORIDE 75 ML/HR: 9 INJECTION, SOLUTION INTRAVENOUS at 09:31

## 2020-07-21 NOTE — DISCHARGE INSTRUCTIONS
[facility]  [address] [place] [address]    Coronary Angiogram (Radial/Ulnar Approach) After Care    Refer to this sheet in the next few weeks. These instructions provide you with information on caring for yourself after your procedure. Your caregiver may also give you more specific instructions. Your treatment has been planned according to current medical practices, but problems sometimes occur. Call your caregiver if you have any problems or questions after your procedure.    Home Care Instructions:  · You may shower the day after the procedure. Remove the bandage (dressing) and gently wash the site with plain soap and water. Gently pat the site dry. You may apply a band aid daily for 2 days if desired.    · Do not apply powder or lotion to the site.  · Do not submerge the affected site in water for 3 to 5 days or until the site is completely healed.   · Do not lift, push or pull anything over 5 pounds for 5 days after your procedure. As a reference, a gallon of milk weighs 8 pounds.   · Inspect the site at least twice daily. You may notice some bruising at the site and it may be tender for 1 to 2 weeks.     · Increase your fluid intake for the next 2 days.    · Keep arm elevated for 24 hours. For the remainder of the day, keep your arm in “Pledge of Allegiance” position when up and about.     · You may drive 24 hours after the procedure unless otherwise instructed by your caregiver.  · Do not operate machinery or power tools for 24 hours.  · A responsible adult should be with you for the first 24 hours after you arrive home. Do not make any important legal decisions or sign legal papers for 24 hours.  Do not drink alcohol for 24 hours.    · Metformin or any medications containing Metformin should not be taken for 48 hours after your procedure.      Call Your Doctor if:   · You have unusual pain at the radial/ulnar (wrist) site.  · You have redness, warmth, swelling, or pain at the  radial/ulnar (wrist) site.  · You have drainage (other than a small amount of blood on the dressing).  · You have chills or a fever > 101.  · Your arm becomes pale or dark, cool, tingly, or numb.  · You have heavy bleeding from the site, hold pressure on the site for 20 minutes.  If the bleeding stops, apply a fresh bandage and call your cardiologist.  However, if you continue to have bleeding, call 911.

## 2020-07-21 NOTE — H&P
[facility] H&P        Subjective:     Encounter Date:06/18/2020      Patient ID: Payam Owens is a 85 y.o. male.      Chief Complaint:    HPI: Mr. Payam Owens is a very pleasant 85-year-old male.  He does not report any prior known cardiac disease history.  He has a good activity level which is remained stable.  He exercises aerobically on a routine basis.  He does not experience chest pain pressure or tightness.  He does report dyspnea on exertion but he feels that this is also remained stable.  He is free of orthopnea or PND.  He is noticed however lower extremity edema left greater than right.  He denies syncope near syncope or palpitation.  Mr. Owens does not report any known injury or surgery to his lower extremities.  Cardiac risk factors are positive for hypertension negative for diabetes dyslipidemia tobacco use or family history of premature coronary heart disease.Echocardiogram obtained 5/29/2020 revealed preserved left ventricular function, grade 1 diastolic dysfunction.  The aortic valve revealed a moderate calcification with evidence of mild aortic insufficiency.  Doppler interrogation revealed moderate aortic stenosis but visually the valve appeared to be more consistent with moderate to severe aortic stenosis.  He reports continued and progressive respiratory insufficiency and lower extremity edema.      The following portions of the patient's history were reviewed and updated as appropriate: allergies, current medications, past family history, past medical history, past social history, past surgical history and problem list.      Past Medical History:   Diagnosis Date   • Edema    • Essential hypertension 6/8/2020   • History of echocardiogram 05/29/2020    EF = 66.0%, borderline concentric hypertrophy, diastolic dysfunction (grade I), moderate calcification of the aortic valve, moderate calcification of the aortic valve.Trace-to-mild aortic valve regurgitation, mild to moderate  aortic stenosis, Mild MR/TR/NM         Past Surgical History:   Procedure Laterality Date   • BACK SURGERY           Social History     Socioeconomic History   • Marital status:      Spouse name: Not on file   • Number of children: Not on file   • Years of education: Not on file   • Highest education level: Not on file   Tobacco Use   • Smoking status: Never Smoker   • Smokeless tobacco: Never Used   Substance and Sexual Activity   • Alcohol use: Yes     Frequency: Monthly or less   • Drug use: Never   • Sexual activity: Defer         No Known Allergies    Current Medications:   Scheduled Meds:    sodium chloride 3 mL Intravenous Q12H     Continuous Infusions:    sodium chloride 75 mL/hr       Review of Systems   Constitution: Negative for malaise/fatigue.   HENT: Negative for hearing loss and nosebleeds.    Eyes: Negative for double vision and visual disturbance.   Cardiovascular: Positive for dyspnea on exertion and leg swelling. Negative for chest pain, claudication, near-syncope, orthopnea, palpitations, paroxysmal nocturnal dyspnea and syncope.   Respiratory: Negative for cough, shortness of breath, sleep disturbances due to breathing, snoring, sputum production and wheezing.    Endocrine: Negative for cold intolerance, heat intolerance, polydipsia and polyuria.   Hematologic/Lymphatic: Negative for adenopathy and bleeding problem. Does not bruise/bleed easily.   Skin: Negative for flushing and itching.   Musculoskeletal: Negative for back pain, falls, joint pain, joint swelling, muscle weakness and neck pain.   Gastrointestinal: Negative for abdominal pain, dysphagia, heartburn, nausea and vomiting.   Genitourinary: Negative for dysuria and frequency.   Neurological: Negative for disturbances in coordination, dizziness, light-headedness, loss of balance, numbness and weakness.   Psychiatric/Behavioral: Negative for altered mental status and memory loss. The patient is not nervous/anxious.     Allergic/Immunologic: Negative for hives and persistent infections.          Objective:         There were no vitals taken for this visit.    Physical Exam   Constitutional: He is oriented to person, place, and time. He appears well-developed and well-nourished.   HENT:   Head: Normocephalic and atraumatic.   Eyes: Pupils are equal, round, and reactive to light. Conjunctivae and EOM are normal.   Neck: Neck supple. No thyromegaly present.   Cardiovascular: Normal rate, regular rhythm, S1 normal, S2 normal, normal heart sounds and intact distal pulses. PMI is not displaced. Exam reveals no gallop, no S3, no S4, no distant heart sounds, no friction rub, no midsystolic click and no opening snap.   No murmur heard.  Pulses:       Carotid pulses are 2+ on the right side, and 2+ on the left side.       Radial pulses are 2+ on the right side, and 2+ on the left side.        Femoral pulses are 2+ on the right side, and 2+ on the left side.       Dorsalis pedis pulses are 2+ on the right side, and 2+ on the left side.        Posterior tibial pulses are 2+ on the right side, and 2+ on the left side.   2/6 crescendo decrescendo late peaking systolic murmur left sternal border to the right upper sternal border   Pulmonary/Chest: Effort normal and breath sounds normal. No respiratory distress. He has no wheezes. He has no rales.   Abdominal: Soft. Bowel sounds are normal. He exhibits no distension and no mass. There is no tenderness.   Musculoskeletal: Normal range of motion. He exhibits no edema.   Trace to 1+ bilateral lower extremity edema, left greater than right   Neurological: He is alert and oriented to person, place, and time.   Skin: Skin is warm and dry. No erythema.   Psychiatric: He has a normal mood and affect.             ASCVD RIsk Score::  The ASCVD Risk score (Sharita CELINA Jr., et al., 2013) failed to calculate for the following reasons:    The 2013 ASCVD risk score is only valid for ages 40 to 79      Lab Review:    not applicable     Results from last 7 days   Lab Units 07/14/20  1003   SODIUM mmol/L 142   POTASSIUM mmol/L 4.0   CHLORIDE mmol/L 106   CO2 mmol/L 27.6   BUN mg/dL 22   CREATININE mg/dL 0.92   GLUCOSE mg/dL 109*   CALCIUM mg/dL 9.2   AST (SGOT) U/L 22   ALT (SGPT) U/L 15         Results from last 7 days   Lab Units 07/14/20  1003   WBC 10*3/mm3 6.94   HEMOGLOBIN g/dL 13.3   HEMATOCRIT % 39.0   PLATELETS 10*3/mm3 189     Results from last 7 days   Lab Units 07/14/20  1003   INR  1.02               Invalid input(s): LDLCALC            Recent Radiology:  Imaging Results (Most Recent)     None          EKG:        Assessment:         Active Hospital Problems    Diagnosis POA   • **Aortic stenosis, moderate [I35.0] Yes     Added automatically from request for surgery [id number]     • Respiratory insufficiency [R06.89] Unknown   • Bilateral lower extremity edema [R60.0] Unknown   • Essential hypertension [I10] Yes          Plan:     Patient is admitted to undergo right and left heart catheterization coronary geography and left ventriculogram.  Risks and benefits have been explained.  Patient understands and is prepared to proceed.               Collin Harvey MD  07/21/20  09:18

## 2020-07-24 LAB
HCT VFR BLDA CALC: 33 % (ref 38–51)
HCT VFR BLDA CALC: 35 % (ref 38–51)
HCT VFR BLDA CALC: 35 % (ref 38–51)
HGB BLDA-MCNC: 11.2 G/DL (ref 12–17)
HGB BLDA-MCNC: 11.9 G/DL (ref 12–17)
HGB BLDA-MCNC: 11.9 G/DL (ref 12–17)
SAO2 % BLDA: 71 % (ref 95–98)
SAO2 % BLDA: 76 % (ref 95–98)
SAO2 % BLDA: 92 % (ref 95–98)

## 2020-08-03 ENCOUNTER — HOSPITAL ENCOUNTER (OUTPATIENT)
Dept: LAB | Facility: HOSPITAL | Age: 85
Discharge: HOME | End: 2020-08-03
Attending: PHYSICIAN ASSISTANT

## 2020-08-03 LAB
BASOPHILS # BLD AUTO: 0.05 10*3/UL (ref 0–0.2)
BASOPHILS NFR BLD AUTO: 0.9 % (ref 0–3)
CONV ABS IMM GRAN: 0.01 10*3/UL (ref 0–0.2)
CONV IMMATURE GRAN: 0.2 % (ref 0–1.8)
DEPRECATED RDW RBC AUTO: 43.5 FL (ref 35.1–43.9)
EOSINOPHIL # BLD AUTO: 0.18 10*3/UL (ref 0–0.7)
EOSINOPHIL # BLD AUTO: 3.2 % (ref 0–7)
ERYTHROCYTE [DISTWIDTH] IN BLOOD BY AUTOMATED COUNT: 12.4 % (ref 11.6–14.4)
EST. AVERAGE GLUCOSE BLD GHB EST-MCNC: 117 MG/DL
FERRITIN SERPL-MCNC: 60 NG/ML (ref 30–300)
HBA1C MFR BLD: 5.7 % (ref 3.5–5.7)
HCT VFR BLD AUTO: 43.8 % (ref 42–52)
HGB BLD-MCNC: 14.1 G/DL (ref 14–18)
IRON SATN MFR SERPL: 26 % (ref 20–55)
IRON SERPL-MCNC: 89 UG/DL (ref 70–180)
LYMPHOCYTES # BLD AUTO: 1.7 10*3/UL (ref 1–5)
LYMPHOCYTES NFR BLD AUTO: 29.9 % (ref 20–45)
MCH RBC QN AUTO: 30.9 PG (ref 27–31)
MCHC RBC AUTO-ENTMCNC: 32.2 G/DL (ref 33–37)
MCV RBC AUTO: 95.8 FL (ref 80–96)
MONOCYTES # BLD AUTO: 0.39 10*3/UL (ref 0.2–1.2)
MONOCYTES NFR BLD AUTO: 6.9 % (ref 3–10)
NEUTROPHILS # BLD AUTO: 3.36 10*3/UL (ref 2–8)
NEUTROPHILS NFR BLD AUTO: 58.9 % (ref 30–85)
NRBC CBCN: 0 % (ref 0–0.7)
PLATELET # BLD AUTO: 209 10*3/UL (ref 130–400)
PMV BLD AUTO: 11.1 FL (ref 9.4–12.4)
RBC # BLD AUTO: 4.57 10*6/UL (ref 4.7–6.1)
T4 FREE SERPL-MCNC: 1 NG/DL (ref 0.9–1.8)
TIBC SERPL-MCNC: 336 UG/DL (ref 245–450)
TRANSFERRIN SERPL-MCNC: 235 MG/DL (ref 215–365)
TSH SERPL-ACNC: 5.37 M[IU]/L (ref 0.27–4.2)
WBC # BLD AUTO: 5.69 10*3/UL (ref 4.8–10.8)

## 2020-08-04 LAB
FOLATE SERPL-MCNC: 19.7 NG/ML (ref 4.8–20)
VIT B12 SERPL-MCNC: 442 PG/ML (ref 211–911)

## 2020-08-20 ENCOUNTER — OFFICE VISIT (OUTPATIENT)
Dept: CARDIOLOGY | Facility: CLINIC | Age: 85
End: 2020-08-20

## 2020-08-20 VITALS
HEART RATE: 62 BPM | WEIGHT: 167 LBS | BODY MASS INDEX: 22.62 KG/M2 | DIASTOLIC BLOOD PRESSURE: 82 MMHG | HEIGHT: 72 IN | SYSTOLIC BLOOD PRESSURE: 124 MMHG

## 2020-08-20 DIAGNOSIS — R06.89 RESPIRATORY INSUFFICIENCY: ICD-10-CM

## 2020-08-20 DIAGNOSIS — R60.0 BILATERAL LOWER EXTREMITY EDEMA: ICD-10-CM

## 2020-08-20 DIAGNOSIS — I35.0 AORTIC STENOSIS, MILD: ICD-10-CM

## 2020-08-20 DIAGNOSIS — I10 ESSENTIAL HYPERTENSION: Primary | ICD-10-CM

## 2020-08-20 PROCEDURE — 99213 OFFICE O/P EST LOW 20 MIN: CPT | Performed by: INTERNAL MEDICINE

## 2020-08-20 NOTE — PROGRESS NOTES
[facility]        Subjective:     Encounter Date:08/20/2020      Patient ID: Payam Owens is a 85 y.o. male.      HPI: I saw Payam Owens today for cardiovascular care.  He is a pleasant and active 85-year-old male.  He is free of fever cough or increased shortness of breath.  He does not report any change in his sense of smell or taste.  Mr. Owens does not report chest pain pressure or tightness.  His respiratory status is improved and he is free of orthopnea or PND.  His lower extremity edema has significantly decreased and he is using compression stockings.  He denies syncope near syncope or palpitations.Echocardiogram obtained 5/29/2020 revealed preserved left ventricular function, grade 1 diastolic dysfunction moderately calcified aortic valve with restricted valve motion.  Doppler assessment was inadequate but visually it appeared to be moderate to severe restricted motion of the aortic valve.  He subsequently underwent right left heart catheterization 7/21/2020.  This revealed normal right left heart filling pressures, no significant aortic valve gradient, left ventricular ejection fraction approximately 60%, and nonobstructive coronary heart disease.  His blood pressure remains well controlled.      The following portions of the patient's history were reviewed and updated as appropriate: allergies, current medications, past family history, past medical history, past social history, past surgical history and problem list.    Problem List:  Patient Active Problem List   Diagnosis   • Essential hypertension   • Aortic stenosis, mild   • Respiratory insufficiency   • Bilateral lower extremity edema       Past Medical History:  Past Medical History:   Diagnosis Date   • Edema    • Enlarged prostate    • Essential hypertension 6/8/2020   • History of echocardiogram 05/29/2020    EF = 66.0%, borderline concentric hypertrophy, diastolic dysfunction (grade I), moderate calcification of the aortic  valve, moderate calcification of the aortic valve.Trace-to-mild aortic valve regurgitation, mild to moderate aortic stenosis, Mild MR/TR/OK       Past Surgical History:  Past Surgical History:   Procedure Laterality Date   • BACK SURGERY     • CARDIAC CATHETERIZATION N/A 7/21/2020    Procedure: Right and Left Heart Cath;  Surgeon: Collin Harvey MD;  Location: [facility] CATH INVASIVE LOCATION;  Service: Cardiology;  Laterality: N/A;   • CARDIAC CATHETERIZATION N/A 7/21/2020    Procedure: Left ventriculography;  Surgeon: Collin Harvey MD;  Location: [facility] CATH INVASIVE LOCATION;  Service: Cardiology;  Laterality: N/A;   • CARDIAC CATHETERIZATION N/A 7/21/2020    Procedure: Coronary angiography;  Surgeon: Collin Harvey MD;  Location: [facility] CATH INVASIVE LOCATION;  Service: Cardiology;  Laterality: N/A;   • PROSTATE SURGERY         Social History:  Social History     Socioeconomic History   • Marital status:      Spouse name: Not on file   • Number of children: Not on file   • Years of education: Not on file   • Highest education level: Not on file   Tobacco Use   • Smoking status: Never Smoker   • Smokeless tobacco: Never Used   Substance and Sexual Activity   • Alcohol use: Yes     Frequency: Monthly or less   • Drug use: Never   • Sexual activity: Defer       Allergies:  No Known Allergies      ROS:  Review of Systems   Constitution: Negative for malaise/fatigue.   HENT: Negative for hearing loss and nosebleeds.    Eyes: Negative for double vision and visual disturbance.   Cardiovascular: Positive for dyspnea on exertion. Negative for chest pain, claudication, near-syncope, orthopnea, palpitations, paroxysmal nocturnal dyspnea and syncope.   Respiratory: Negative for cough, shortness of breath, sleep disturbances due to breathing, snoring, sputum production and wheezing.    Endocrine: Negative for cold intolerance, heat intolerance, polydipsia and polyuria.   Hematologic/Lymphatic: Negative for  "adenopathy and bleeding problem. Does not bruise/bleed easily.   Skin: Negative for flushing and itching.   Musculoskeletal: Negative for back pain, falls, joint pain, joint swelling, muscle weakness and neck pain.   Gastrointestinal: Negative for abdominal pain, dysphagia, heartburn, nausea and vomiting.   Genitourinary: Negative for dysuria and frequency.   Neurological: Negative for disturbances in coordination, dizziness, light-headedness, loss of balance, numbness and weakness.   Psychiatric/Behavioral: Negative for altered mental status and memory loss. The patient is not nervous/anxious.    Allergic/Immunologic: Negative for hives and persistent infections.          Objective:         /82   Pulse 62   Ht 182.9 cm (72\")   Wt 75.8 kg (167 lb)   BMI 22.65 kg/m²     Physical Exam   Constitutional: He is oriented to person, place, and time. He appears well-developed and well-nourished.   HENT:   Head: Normocephalic and atraumatic.   Eyes: Pupils are equal, round, and reactive to light. Conjunctivae and EOM are normal.   Neck: Neck supple. No thyromegaly present.   Cardiovascular: Normal rate, regular rhythm, S1 normal, S2 normal and intact distal pulses. PMI is not displaced. Exam reveals gallop and S4. Exam reveals no S3, no distant heart sounds, no friction rub, no midsystolic click and no opening snap.   No murmur heard.  Pulses:       Carotid pulses are 2+ on the right side, and 2+ on the left side.       Radial pulses are 2+ on the right side, and 2+ on the left side.        Femoral pulses are 2+ on the right side, and 2+ on the left side.       Dorsalis pedis pulses are 2+ on the right side, and 2+ on the left side.        Posterior tibial pulses are 2+ on the right side, and 2+ on the left side.   Pulmonary/Chest: Effort normal and breath sounds normal. No respiratory distress. He has no wheezes. He has no rales.   Abdominal: Soft. Bowel sounds are normal. He exhibits no distension and no mass. " There is no tenderness.   Musculoskeletal: Normal range of motion. He exhibits no edema.   Trace bilateral lower extremity edema   Neurological: He is alert and oriented to person, place, and time.   Skin: Skin is warm and dry. No erythema.   Psychiatric: He has a normal mood and affect.       In-Office Procedure(s):  Procedures    ASCVD RIsk Score::  The ASCVD Risk score ([place] PAT[place] et al., 2013) failed to calculate for the following reasons:    The 2013 ASCVD risk score is only valid for ages 40 to 79        Assessment/Plan:         1. Essential hypertension  Controlled    2. Respiratory insufficiency  Stable    3. Bilateral lower extremity edema  Improved    4. Aortic stenosis, mild  Stable    5.  Grade 1 diastolic dysfunction    Mr. Owens is stable from a cardiovascular standpoint.  He maintains a good activity level while observing the CDC guidelines for social distancing.  I made no changes in his medications.  I will see him in follow-up in 6 months.  Encouraged him to continue restricting salt in his diet.       Collin Harvey MD  08/20/20  .

## 2021-02-09 ENCOUNTER — HOSPITAL ENCOUNTER (OUTPATIENT)
Dept: LAB | Facility: HOSPITAL | Age: 86
Discharge: HOME | End: 2021-02-09
Attending: PHYSICIAN ASSISTANT

## 2021-02-09 LAB
25(OH)D3 SERPL-MCNC: 38.8 NG/ML (ref 30–100)
ALBUMIN SERPL-MCNC: 3.7 G/DL (ref 3.5–5)
ALBUMIN/GLOB SERPL: 1.4 {RATIO} (ref 1.4–2.6)
ALP SERPL-CCNC: 122 U/L (ref 56–155)
ALT SERPL-CCNC: 15 U/L (ref 10–40)
ANION GAP SERPL CALC-SCNC: 12 MMOL/L (ref 8–19)
AST SERPL-CCNC: 24 U/L (ref 15–50)
BASOPHILS # BLD AUTO: 0.03 10*3/UL (ref 0–0.2)
BASOPHILS NFR BLD AUTO: 0.6 % (ref 0–3)
BILIRUB SERPL-MCNC: 0.31 MG/DL (ref 0.2–1.3)
BUN SERPL-MCNC: 22 MG/DL (ref 5–25)
BUN/CREAT SERPL: 23 {RATIO} (ref 6–20)
CALCIUM SERPL-MCNC: 9.1 MG/DL (ref 8.7–10.4)
CHLORIDE SERPL-SCNC: 103 MMOL/L (ref 99–111)
CHOLEST SERPL-MCNC: 181 MG/DL (ref 107–200)
CHOLEST/HDLC SERPL: 3.2 {RATIO} (ref 3–6)
CONV ABS IMM GRAN: 0.01 10*3/UL (ref 0–0.2)
CONV CO2: 29 MMOL/L (ref 22–32)
CONV IMMATURE GRAN: 0.2 % (ref 0–1.8)
CONV TOTAL PROTEIN: 6.4 G/DL (ref 6.3–8.2)
CREAT UR-MCNC: 0.95 MG/DL (ref 0.7–1.2)
DEPRECATED RDW RBC AUTO: 42.7 FL (ref 35.1–43.9)
EOSINOPHIL # BLD AUTO: 0.23 10*3/UL (ref 0–0.7)
EOSINOPHIL # BLD AUTO: 4.4 % (ref 0–7)
ERYTHROCYTE [DISTWIDTH] IN BLOOD BY AUTOMATED COUNT: 12.4 % (ref 11.6–14.4)
GFR SERPLBLD BASED ON 1.73 SQ M-ARVRAT: >60 ML/MIN/{1.73_M2}
GLOBULIN UR ELPH-MCNC: 2.7 G/DL (ref 2–3.5)
GLUCOSE SERPL-MCNC: 104 MG/DL (ref 70–99)
HCT VFR BLD AUTO: 43.2 % (ref 42–52)
HDLC SERPL-MCNC: 56 MG/DL (ref 40–60)
HGB BLD-MCNC: 14 G/DL (ref 14–18)
LDLC SERPL CALC-MCNC: 116 MG/DL (ref 70–100)
LYMPHOCYTES # BLD AUTO: 1.77 10*3/UL (ref 1–5)
LYMPHOCYTES NFR BLD AUTO: 33.9 % (ref 20–45)
MCH RBC QN AUTO: 30.6 PG (ref 27–31)
MCHC RBC AUTO-ENTMCNC: 32.4 G/DL (ref 33–37)
MCV RBC AUTO: 94.5 FL (ref 80–96)
MONOCYTES # BLD AUTO: 0.38 10*3/UL (ref 0.2–1.2)
MONOCYTES NFR BLD AUTO: 7.3 % (ref 3–10)
NEUTROPHILS # BLD AUTO: 2.8 10*3/UL (ref 2–8)
NEUTROPHILS NFR BLD AUTO: 53.6 % (ref 30–85)
NRBC CBCN: 0 % (ref 0–0.7)
OSMOLALITY SERPL CALC.SUM OF ELEC: 294 MOSM/KG (ref 273–304)
PLATELET # BLD AUTO: 214 10*3/UL (ref 130–400)
PMV BLD AUTO: 10.3 FL (ref 9.4–12.4)
POTASSIUM SERPL-SCNC: 4 MMOL/L (ref 3.5–5.3)
RBC # BLD AUTO: 4.57 10*6/UL (ref 4.7–6.1)
SODIUM SERPL-SCNC: 140 MMOL/L (ref 135–147)
T4 FREE SERPL-MCNC: 1 NG/DL (ref 0.9–1.8)
TRIGL SERPL-MCNC: 44 MG/DL (ref 40–150)
TSH SERPL-ACNC: 6.06 M[IU]/L (ref 0.27–4.2)
VLDLC SERPL-MCNC: 9 MG/DL (ref 5–37)
WBC # BLD AUTO: 5.22 10*3/UL (ref 4.8–10.8)

## 2021-02-17 PROBLEM — I10 ESSENTIAL HYPERTENSION: Chronic | Status: ACTIVE | Noted: 2020-06-08

## 2021-02-17 PROBLEM — I35.0 AORTIC STENOSIS, MILD: Chronic | Status: ACTIVE | Noted: 2020-06-18

## 2021-02-17 PROBLEM — R06.89 RESPIRATORY INSUFFICIENCY: Chronic | Status: ACTIVE | Noted: 2020-07-21

## 2021-02-17 PROBLEM — R60.0 BILATERAL LOWER EXTREMITY EDEMA: Chronic | Status: ACTIVE | Noted: 2020-07-21

## 2021-02-18 ENCOUNTER — TELEMEDICINE (OUTPATIENT)
Dept: CARDIOLOGY | Facility: CLINIC | Age: 86
End: 2021-02-18

## 2021-02-18 VITALS
WEIGHT: 170 LBS | DIASTOLIC BLOOD PRESSURE: 76 MMHG | HEIGHT: 72 IN | SYSTOLIC BLOOD PRESSURE: 147 MMHG | BODY MASS INDEX: 23.03 KG/M2 | HEART RATE: 72 BPM

## 2021-02-18 DIAGNOSIS — I35.0 AORTIC STENOSIS, MILD: Chronic | ICD-10-CM

## 2021-02-18 DIAGNOSIS — R06.89 RESPIRATORY INSUFFICIENCY: Primary | Chronic | ICD-10-CM

## 2021-02-18 DIAGNOSIS — R60.0 BILATERAL LOWER EXTREMITY EDEMA: Chronic | ICD-10-CM

## 2021-02-18 DIAGNOSIS — I51.89 GRADE I DIASTOLIC DYSFUNCTION: Chronic | ICD-10-CM

## 2021-02-18 DIAGNOSIS — I10 ESSENTIAL HYPERTENSION: Chronic | ICD-10-CM

## 2021-02-18 PROCEDURE — 99214 OFFICE O/P EST MOD 30 MIN: CPT | Performed by: INTERNAL MEDICINE

## 2021-02-18 RX ORDER — LOSARTAN POTASSIUM 25 MG/1
25 TABLET ORAL DAILY
Qty: 90 TABLET | Refills: 3 | Status: SHIPPED | OUTPATIENT
Start: 2021-02-18 | End: 2021-09-13 | Stop reason: SDUPTHER

## 2021-02-18 NOTE — PROGRESS NOTES
"Chief Complaint  Hypertension    Subjective    History of Present Illness      Payam Owens agreed to a video visit today secondary to the coronavirus pandemic.  He is a pleasant 86-year-old male who observes the CDC guidelines.  Mr. Owens denies chest pain pressure tightness.  He sleeps with 2 pillows free of orthopnea or PND there is no syncope near syncope or palpitation.  He does report trace lower extremity edema but this is minimal and well controlled with compression stockings.  He has obstructive sleep apnea and does not use CPAP.  He does report exercising routinely but when he starts to run he is has shortness of breath to the point he has to stop.  He will restart and he can complete his exercise.  Review of his echocardiogram from 5/29/2020 reveals normal left ventricular contractility, grade 1 diastolic dysfunction.  Trace to mild aortic insufficiency and moderate aortic leaflet calcification.  Mild to moderate aortic stenosis.  Patient underwent coronary angiography 7/21/2020.  This did not demonstrate any significant aortic valve gradient or stenosis.  And nonobstructive coronary heart disease.  His blood pressure is elevated today at 147/76.  Objective   Vital Signs:   /76   Pulse 72   Ht 182.9 cm (72\")   Wt 77.1 kg (170 lb)   BMI 23.06 kg/m²     Physical Exam not performed  Result Review :   The following data was reviewed by: Collin Harvey MD on 02/18/2021:  Common labs    Common Labsle 7/14/20 7/14/20 7/21/20 7/21/20 7/21/20    1003 1003 1131 1134 1150   Glucose  109 (A)      BUN  22      Creatinine  0.92      eGFR Non African Am  79      Sodium  142      Potassium  4.0      Chloride  106      Calcium  9.2      Albumin  3.40 (A)      Total Bilirubin  0.3      Alkaline Phosphatase  92      AST (SGOT)  22      ALT (SGPT)  15      WBC 6.94       Hemoglobin 13.3  11.9 (A) 11.9 (A) 11.2 (A)   Hematocrit 39.0  35 (A) 35 (A) 33 (A)   Platelets 189       (A) Abnormal value            Data " reviewed: Cardiology studies Right and left heart catheterization coronary arteriography 7/21/2020, echocardiogram 5/29/2020          Assessment and Plan    1. Respiratory insufficiency  Recurrent with aerobic exercise    2. Bilateral lower extremity edema  Trace well controlled with compression stockings    3. Essential hypertension  Target less than 130/80    4. Grade I diastolic dysfunction  Blood pressure control    5. Aortic stenosis, mild  Well compensated    Mr. Owens reports dyspnea on exertion at the beginning of his aerobic exercise.  I will initiate losartan 25 mg daily.  He will monitor his blood pressure with a goal of less than 130/80 without orthostatic dizziness.  I will obtain a BMP in 7 to 10 days.  I will follow-up with him in 3 months.  He will contact me if his respiratory insufficiency does not improve or regresses.        Follow Up   No follow-ups on file.  Patient was given instructions and counseling regarding his condition or for health maintenance advice. Please see specific information pulled into the AVS if appropriate.

## 2021-03-03 ENCOUNTER — HOSPITAL ENCOUNTER (OUTPATIENT)
Dept: LAB | Facility: HOSPITAL | Age: 86
Discharge: HOME | End: 2021-03-03
Attending: INTERNAL MEDICINE

## 2021-03-03 LAB
ANION GAP SERPL CALC-SCNC: 15 MMOL/L (ref 8–19)
BUN SERPL-MCNC: 22 MG/DL (ref 5–25)
BUN/CREAT SERPL: 21 {RATIO} (ref 6–20)
CALCIUM SERPL-MCNC: 9.2 MG/DL (ref 8.7–10.4)
CHLORIDE SERPL-SCNC: 102 MMOL/L (ref 99–111)
CONV CO2: 27 MMOL/L (ref 22–32)
CREAT UR-MCNC: 1.07 MG/DL (ref 0.7–1.2)
GFR SERPLBLD BASED ON 1.73 SQ M-ARVRAT: >60 ML/MIN/{1.73_M2}
GLUCOSE SERPL-MCNC: 101 MG/DL (ref 70–99)
OSMOLALITY SERPL CALC.SUM OF ELEC: 293 MOSM/KG (ref 273–304)
POTASSIUM SERPL-SCNC: 4.1 MMOL/L (ref 3.5–5.3)
SODIUM SERPL-SCNC: 140 MMOL/L (ref 135–147)

## 2021-05-06 ENCOUNTER — OFFICE VISIT CONVERTED (OUTPATIENT)
Dept: NEUROLOGY | Facility: CLINIC | Age: 86
End: 2021-05-06
Attending: NURSE PRACTITIONER

## 2021-05-15 VITALS
HEIGHT: 72 IN | OXYGEN SATURATION: 100 % | DIASTOLIC BLOOD PRESSURE: 75 MMHG | WEIGHT: 170.25 LBS | TEMPERATURE: 97.4 F | BODY MASS INDEX: 23.06 KG/M2 | HEART RATE: 62 BPM | SYSTOLIC BLOOD PRESSURE: 141 MMHG | RESPIRATION RATE: 24 BRPM

## 2021-05-16 VITALS
DIASTOLIC BLOOD PRESSURE: 89 MMHG | HEIGHT: 72 IN | HEART RATE: 80 BPM | BODY MASS INDEX: 23.03 KG/M2 | TEMPERATURE: 97.5 F | WEIGHT: 170 LBS | SYSTOLIC BLOOD PRESSURE: 146 MMHG

## 2021-05-16 VITALS — BODY MASS INDEX: 23.8 KG/M2 | HEIGHT: 71 IN | WEIGHT: 170 LBS | RESPIRATION RATE: 14 BRPM

## 2021-05-16 VITALS
SYSTOLIC BLOOD PRESSURE: 165 MMHG | DIASTOLIC BLOOD PRESSURE: 72 MMHG | BODY MASS INDEX: 24.5 KG/M2 | HEIGHT: 71 IN | TEMPERATURE: 98.3 F | WEIGHT: 175 LBS | HEART RATE: 66 BPM

## 2021-05-16 VITALS — WEIGHT: 170.5 LBS | HEART RATE: 74 BPM | HEIGHT: 71 IN | OXYGEN SATURATION: 100 % | BODY MASS INDEX: 23.87 KG/M2

## 2021-05-16 VITALS — WEIGHT: 172 LBS | BODY MASS INDEX: 24.08 KG/M2 | RESPIRATION RATE: 16 BRPM | HEIGHT: 71 IN

## 2021-05-16 VITALS
WEIGHT: 170.5 LBS | SYSTOLIC BLOOD PRESSURE: 152 MMHG | HEART RATE: 70 BPM | RESPIRATION RATE: 16 BRPM | TEMPERATURE: 96.7 F | OXYGEN SATURATION: 99 % | BODY MASS INDEX: 23.87 KG/M2 | HEIGHT: 71 IN | DIASTOLIC BLOOD PRESSURE: 78 MMHG

## 2021-05-16 VITALS
HEIGHT: 71 IN | WEIGHT: 175 LBS | SYSTOLIC BLOOD PRESSURE: 134 MMHG | BODY MASS INDEX: 24.5 KG/M2 | DIASTOLIC BLOOD PRESSURE: 81 MMHG | HEART RATE: 102 BPM | TEMPERATURE: 98.3 F

## 2021-05-16 VITALS — BODY MASS INDEX: 24.5 KG/M2 | WEIGHT: 175 LBS | HEIGHT: 71 IN | RESPIRATION RATE: 14 BRPM

## 2021-05-16 VITALS — BODY MASS INDEX: 24.36 KG/M2 | WEIGHT: 174 LBS | HEIGHT: 71 IN | RESPIRATION RATE: 16 BRPM

## 2021-05-20 ENCOUNTER — OFFICE VISIT (OUTPATIENT)
Dept: CARDIOLOGY | Facility: CLINIC | Age: 86
End: 2021-05-20

## 2021-05-20 VITALS
SYSTOLIC BLOOD PRESSURE: 132 MMHG | DIASTOLIC BLOOD PRESSURE: 68 MMHG | HEIGHT: 72 IN | WEIGHT: 177 LBS | BODY MASS INDEX: 23.98 KG/M2 | HEART RATE: 82 BPM

## 2021-05-20 DIAGNOSIS — I35.0 AORTIC STENOSIS, MODERATE: ICD-10-CM

## 2021-05-20 DIAGNOSIS — I10 ESSENTIAL HYPERTENSION: Primary | ICD-10-CM

## 2021-05-20 DIAGNOSIS — R06.89 RESPIRATORY INSUFFICIENCY: ICD-10-CM

## 2021-05-20 DIAGNOSIS — R60.0 BILATERAL LOWER EXTREMITY EDEMA: ICD-10-CM

## 2021-05-20 DIAGNOSIS — I51.89 GRADE I DIASTOLIC DYSFUNCTION: ICD-10-CM

## 2021-05-20 PROCEDURE — 99215 OFFICE O/P EST HI 40 MIN: CPT | Performed by: INTERNAL MEDICINE

## 2021-05-20 RX ORDER — GABAPENTIN 100 MG/1
1 CAPSULE ORAL 3 TIMES DAILY
COMMUNITY
Start: 2021-05-06 | End: 2021-08-26

## 2021-05-20 NOTE — PROGRESS NOTES
Chief Complaint  No chief complaint on file.    Subjective    History of Present Illness      I saw Payam Owens today for continued cardiovascular care.  He is a pleasant 86-year-old male who observes the CDC guidelines during the coronavirus pandemic.  He is received his Covid 19 vaccine.  He apparently fell approximately 15 feet 1 month ago sustaining some thoracic vertebral fractures.  He however has remained stable from the cardiovascular standpoint.  He does not report chest pain pressure tightness.  He does not experience any significant progressive dyspnea on exertion.  He is free of orthopnea or PND he does have chronic mild lower extremity edema which is nonprogressive.  He denies syncope near syncope or palpitations.  His blood pressure remains well controlled on losartan 25 mg daily and HCTZ 25 mg daily.    Objective   Vital Signs:   There were no vitals taken for this visit.    Constitutional:       Appearance: Well-developed.   Eyes:      Conjunctiva/sclera: Conjunctivae normal.      Pupils: Pupils are equal, round, and reactive to light.   HENT:      Head: Normocephalic and atraumatic.   Neck:      Thyroid: No thyromegaly.   Pulmonary:      Effort: Pulmonary effort is normal. No respiratory distress.      Breath sounds: Normal breath sounds. No wheezing. No rales.   Cardiovascular:      Normal rate. Regular rhythm.      Murmurs: There is a grade 2/6 crescendo-decrescendo systolic murmur at the URSB.      No gallop. No S3 and S4 gallop. Midsystolic click click.   Edema:     Peripheral edema absent.   Abdominal:      General: Bowel sounds are normal. There is no distension.      Palpations: Abdomen is soft. There is no abdominal mass.      Tenderness: There is no abdominal tenderness.   Musculoskeletal: Normal range of motion.      Cervical back: Neck supple. Skin:     General: Skin is warm and dry.      Findings: No erythema.   Neurological:      Mental Status: Alert and oriented to person, place, and  time.         Result Review :     Common labs    Common Labsle 8/3/20 8/3/20 2/9/21 2/9/21 2/9/21 3/3/21    1029 1029 0942 0942 0942    Glucose    104 (A)  101 (A)   BUN    22  22   Creatinine    0.95  1.07   Sodium    140  140   Potassium    4.0  4.1   Chloride    103  102   Calcium    9.1  9.2   Albumin    3.7     Total Bilirubin    0.31     Alkaline Phosphatase    122     AST (SGOT)    24     ALT (SGPT)    15     WBC 5.69  5.22      Hemoglobin 14.1  14.0      Hematocrit 43.8  43.2      Platelets 209  214      Total Cholesterol     181    Triglycerides     44    HDL Cholesterol     56    LDL Cholesterol      116 (A)    Hemoglobin A1C  5.7       (A) Abnormal value       Comments are available for some flowsheets but are not being displayed.           Data reviewed: Cardiology studies Echocardiogram from 5/29/2020 reveals preserved left ventricular function grade 1 diastolic dysfunction, mild mitral tricuspid and pulmonic insufficiency with trace to mild aortic insufficiency and mild to moderate aortic stenosis.  Coronary angiography with right and left heart catheterization 7/21/2020 revealed nonobstructive coronary artery disease, no significant gradient across the aortic valve, normal right and left heart filling pressures, left ventricular ejection fraction by left ventriculogram 60%          Assessment and Plan    1. Essential hypertension  Controlled    2. Grade I diastolic dysfunction  Compensated    3. Aortic stenosis, moderate  Compensated    4. Bilateral lower extremity edema  Chronic but stable    5. Respiratory insufficiency  Stable    Mr. Owens overall is free of angina and stable from a cardiovascular standpoint.  He does not experience orthopnea or PND and is tolerating his medications.  I will repeat an echocardiogram to reevaluate left ventricular size wall thickness contractility and valvular function.  At the patient's request, I reviewed his chest CT obtained following his 15 foot fall.  I will  see him in follow-up in 3 months sooner if needed.      I spent 45 minutes caring for Payam on this date of service. This time includes time spent by me in the following activities:preparing for the visit, reviewing tests, obtaining and/or reviewing a separately obtained history, performing a medically appropriate examination and/or evaluation , counseling and educating the patient/family/caregiver, ordering medications, tests, or procedures, documenting information in the medical record, independently interpreting results and communicating that information with the patient/family/caregiver and care coordination  Follow Up   No follow-ups on file.  Patient was given instructions and counseling regarding his condition or for health maintenance advice. Please see specific information pulled into the AVS if appropriate.

## 2021-06-05 NOTE — H&P
History and Physical      Patient Name: Payam Owens   Patient ID: [id number]   Sex: Male   YOB: 1935    Primary Care Provider: Esequiel Valdez MD   Referring Provider: Elayne COWAN    Visit Date: May 6, 2021    Provider: CHAPO Heller   Location: [facility]   Location Address: [address] [place] [address]  [id number]   Location Phone: [phone]          Chief Complaint  · Mid back     New patient presenting with T2-T3 fracture. Patient sustained a fall from a zip line approximately 2 weeks ago and has experienced pain since. Imaging from [facility] brought on disc       History Of Present Illness  The patient is a 86 year old /White male who is seen in consultation at the request of Elayne COWAN for evaluation of upper upper back pain. He states the pain is 5/10 in severity, is constant and has a throbbing quality. It began following an injury 2 weeks ago. The pain radiates into the right arm and into the right shoulder.   He also complains of right arm numbness. The patient states the pain is aggravated by bending over, standing, and stretching. The patient states the pain is alleviated by heat and pain medication.   The patient's past medical history is notable for previous back surgery. He underwent lumbar surgery in 1992.   Recent Interventions  He has been previously treated with pain medication. The pain medications were effective.   Information Reviewed  The following information was reviewed radiology reports and images. A MRI of the cervical spine and CT scan revealed significant sprain injury throughout the cervical paraspinals. Multilevel cervical spondylosis with varying degrees of canal and foraminal stenosis, most significant at C5/6 causing mild canal and foraminal stenosis. Right sided slightly displace transverse process fractures at T2 and T3.       Past Medical History  Arthritis; Benign prostate  "hyperplasia; BPH (benign prostatic hyperplasia); Colon Polyps; ED (erectile dysfunction) of organic origin; Essential hypertension; Hand arthritis; Localized edema; Right Knee Hematoma; Screening PSA (prostate specific antigen); Staph infection         Past Surgical History  Back; Back surgery; Colonoscopy; Metal implants; TUNA         Medication List  aspirin 81 mg oral tablet,delayed release (DR/EC); Glucosamine 500 mg oral tablet; hydrochlorothiazide 25 mg oral tablet; hydrocodone-acetaminophen 5-325 mg oral tablet; losartan 25 mg oral tablet; meloxicam 7.5 mg oral tablet; Mucinex DM  mg oral tablet extended release 12 hr; Prostate Health 160-100-100 mg-unit-mcg oral tablet; Tylenol 325 mg oral tablet         Allergy List  NO KNOWN DRUG ALLERGIES       Allergies Reconciled  Family Medical History  Emphysema; Cancer, Unspecified; Breast Neoplasm         Social History  Alcohol (Current some day); Alcohol Use (Current some day); lives with spouse; ; Recreational Drug Use (Never); Retired.; ; Tobacco (Never)         Immunizations  Name Date Admin   Influenza 11/01/2018   Tdap 01/18/2019         Review of Systems  · Cardiovascular  o Admits  o : swollen legs  · Respiratory  o Admits  o : shortness of breath, productive cough  · Neurologic  o Admits  o : falls, difficulty with sleep  · Musculoskeletal  o Admits  o : joint pain, pain radiating to arm, neck stiffness/pain, muscle aches  · All Others Negative      Vitals  Date Time BP Position Site L\R Cuff Size HR RR TEMP (F) WT  HT  BMI kg/m2 BSA m2 O2 Sat FR L/min FiO2        05/06/2021 11:34 /79 Sitting    69 - R 98 96.9 170lbs 8oz 6'  0.5\" 22.81 1.99 100 %            Physical Examination  · Constitutional  o Appearance  o : well nourished, well developed, alert, oriented, in no acute distress  · Respiratory  o Respiratory Effort  o : breathing unlabored, no accessory muscle use  o Inspection of Chest  o : normal appearance, no " retractions  · Cardiovascular  o Peripheral Vascular System  o :   § Extremities  § : no cyanosis, clubbing or edema  · Musculoskeletal  o Spine  o :   § Stability  § : no subluxations present  § Range of Motion  § : range of motion limited in the cervical spine, guarding noted.  · Neurologic  o Mental Status Examination  o :   § Orientation  § : grossly oriented to person, place and time  o Motor Examination  o :   § RLE Strength  § : strength normal  § RLE Motor Function  § : tone normal, no atrophy, no abnormal movements noted  § LLE Strength  § : strength normal  § LLE Motor Function  § : tone normal, no atrophy, no abnormal movements noted  o Reflexes  o :   § RUE  § : biceps reflex 2, triceps reflex 2, brachioradialis reflex 2, neg Zuñiga  § LUE  § : biceps reflex 2, triceps reflex 2, brachioradialis reflex 2, neg Zuñiga  § RLE  § : 2/4 knee and ankle reflex, negative clonus, negative hyperreflexia  § LLE  § : 2/4 knee and ankle reflex, negative clonus, negative hyperreflexia  o Sensation  o :   § Light Touch  § : sensation intact to light touch in extremities  o Gait and Station  o :   § Gait Screening  § : normal gait, able to stand without difficulty  · Psychiatric  o Mood and Affect  o : mood normal, affect appropriate              Assessment  · Paresthesia     782.0/R20.2  · Thoracic Compression Fracture     733.13  · Cervical radiculopathy     723.4/M54.12  · Cervical spondylosis without myelopathy     721.0/M47.812       Pt with acute neck pain following a 15 foot fall from a zip line, with loss of consciousness. MRI Cervical Spine shows strain to cervical paraspinal muscles, as well as chronic cervical spondylosis most progressed at C5/6 with mild canal and moderate foraminal narrowing. This is likely the primary source of his pain. He also has right sided transverse process fractures at T2 and T3. We discussed that this would not be surgical and will heal with time. Will start Gabapentin 100 mg  three times daily. Physical therapy for neck pain and ROM exercises. Follow up in 4 weeks.       Plan  · Medications  o Neurontin 100 mg oral capsule   SI cap po at bedtime x3 days, then 1 cap PO BID X3 days, then 1 cap PO TID   DISP: (90) Capsule with 0 refills  Prescribed on 2021     · Instructions  o Encouraged to follow-up with Primary Care Provider for preventative care.  o The ROS and the PFSH were reviewed at today's visit.  o I have discussed the benefits and the patient wishes to proceed with physical therapy.  o Medications as prescribed.  o Instructed patient to Follow up in 1 month.  o Avoid heavy lifting, twisting, bending.             Electronically Signed by: CHAPO Heller -Author on May 6, 2021 03:59:20 PM

## 2021-06-07 ENCOUNTER — OFFICE VISIT (OUTPATIENT)
Dept: NEUROSURGERY | Facility: CLINIC | Age: 86
End: 2021-06-07

## 2021-06-07 VITALS
WEIGHT: 173.3 LBS | BODY MASS INDEX: 23.47 KG/M2 | HEART RATE: 83 BPM | SYSTOLIC BLOOD PRESSURE: 121 MMHG | HEIGHT: 72 IN | OXYGEN SATURATION: 98 % | DIASTOLIC BLOOD PRESSURE: 60 MMHG | TEMPERATURE: 97.1 F

## 2021-06-07 DIAGNOSIS — S22.009S CLOSED FRACTURE OF TRANSVERSE PROCESS OF THORACIC VERTEBRA, SEQUELA: Primary | ICD-10-CM

## 2021-06-07 DIAGNOSIS — M47.812 CERVICAL SPONDYLOSIS WITHOUT MYELOPATHY: ICD-10-CM

## 2021-06-07 DIAGNOSIS — S16.1XXS CERVICAL STRAIN, ACUTE, SEQUELA: ICD-10-CM

## 2021-06-07 PROBLEM — S22.009A CLOSED FRACTURE OF TRANSVERSE PROCESS OF THORACIC VERTEBRA: Status: ACTIVE | Noted: 2021-06-07

## 2021-06-07 PROCEDURE — 99213 OFFICE O/P EST LOW 20 MIN: CPT | Performed by: NURSE PRACTITIONER

## 2021-06-07 NOTE — PROGRESS NOTES
"Chief Complaint  Back Pain (thoracic compression fracture) and Follow-up    Subjective          Payam Owens who is a 86 y.o. year old male who presents to [facility] for transverse process fractures at T2 and T3.    Pt feels improvement in his pain symptoms. He is becoming more active, starting to mow his yard and fast paced running. Having some pain in the right upper back pain, worse with standing and activity. He is not taking much for pain. He is doing well with the Gabapentin, taking 100 mg twice daily. He is considering backing off on this.      Initial HPI 5/6/21  The patient is a 86 year old /White male who is seen in consultation at the request of Elayne COWAN for evaluation of upper upper back pain. He states the pain is 5/10 in severity, is constant and has a throbbing quality. It began following an injury 2 weeks ago. The pain radiates into the right arm and into the right shoulder.   He also complains of right arm numbness. The patient states the pain is aggravated by bending over, standing, and stretching. The patient states the pain is alleviated by heat and pain medication.   The patient's past medical history is notable for previous back surgery. He underwent lumbar surgery in 1992.     Recent Interventions: Gabapentin 100 mg 2-3 times daily      Review of Systems   Musculoskeletal: Positive for back pain and myalgias.   All other systems reviewed and are negative.       Objective   Vital Signs:   /60   Pulse 83   Temp 97.1 °F (36.2 °C)   Ht 182.9 cm (72\")   Wt 78.6 kg (173 lb 4.8 oz)   SpO2 98%   BMI 23.50 kg/m²       Physical Exam   Neurologic Exam     Result Review :       Data reviewed: Radiologic studies MRI of the cervical spine and CT scan revealed significant sprain injury throughout the cervical paraspinals. Multilevel cervical spondylosis with varying degrees of canal and foraminal stenosis, most significant at " C5/6 causing mild canal and foraminal stenosis. Right sided slightly displace transverse process fractures at T2 and T3.           Assessment and Plan    Diagnoses and all orders for this visit:    1. Closed fracture of transverse process of thoracic vertebra, sequela (Primary)  Assessment & Plan:  No lifting >5 lb for one more month. Avoid jarring activities.      2. Cervical strain, acute, sequela  Assessment & Plan:  Decrease Gabapentin 100 mg nightly. Call for refill if needed.      3. Cervical spondylosis without myelopathy      Follow Up   Return in about 8 weeks (around 8/2/2021).  Patient was given instructions and counseling regarding his condition or for health maintenance advice.     -No lifting >5 lb pounds, no push ups, fast paced walking ok  -Decrease gabapentin to 100 mg at bedtime  -Follow up in 8 weeks

## 2021-06-07 NOTE — PATIENT INSTRUCTIONS
-No lifting >5 lb pounds, no push ups, fast paced walking ok  -Decrease gabapentin to 100 mg at bedtime  -Follow up in 8 weeks

## 2021-06-22 ENCOUNTER — TELEPHONE (OUTPATIENT)
Dept: CARDIOLOGY | Facility: CLINIC | Age: 86
End: 2021-06-22

## 2021-06-22 NOTE — TELEPHONE ENCOUNTER
SARKIS  Pt had an accident and injured his neck & shoulder and is in pain and doesn't want to proceed with his 7/01/21 testing at this time     Pt CB# [phone]

## 2021-06-23 NOTE — TELEPHONE ENCOUNTER
CNA  pts stated he'd like to hold off till August 2021 please  Sp/kelly Carnes in scheduling to reschl  8/05 @ 930am who will contact pt with new date & time    Pt cb# [phone]

## 2021-07-01 ENCOUNTER — APPOINTMENT (OUTPATIENT)
Dept: CARDIOLOGY | Facility: HOSPITAL | Age: 86
End: 2021-07-01

## 2021-07-15 VITALS
DIASTOLIC BLOOD PRESSURE: 79 MMHG | HEART RATE: 69 BPM | SYSTOLIC BLOOD PRESSURE: 155 MMHG | HEIGHT: 72 IN | OXYGEN SATURATION: 100 % | WEIGHT: 170.5 LBS | BODY MASS INDEX: 23.09 KG/M2 | TEMPERATURE: 96.9 F

## 2021-07-19 RX ORDER — HYDROCHLOROTHIAZIDE 25 MG/1
25 TABLET ORAL DAILY
Qty: 90 TABLET | Refills: 0 | Status: SHIPPED | OUTPATIENT
Start: 2021-07-19 | End: 2021-07-20 | Stop reason: SDUPTHER

## 2021-07-20 RX ORDER — HYDROCHLOROTHIAZIDE 25 MG/1
25 TABLET ORAL DAILY
Qty: 90 TABLET | Refills: 0 | Status: SHIPPED | OUTPATIENT
Start: 2021-07-20 | End: 2021-09-13 | Stop reason: SDUPTHER

## 2021-08-05 ENCOUNTER — APPOINTMENT (OUTPATIENT)
Dept: CARDIOLOGY | Facility: HOSPITAL | Age: 86
End: 2021-08-05

## 2021-08-05 ENCOUNTER — HOSPITAL ENCOUNTER (OUTPATIENT)
Dept: CARDIOLOGY | Facility: HOSPITAL | Age: 86
Discharge: HOME | End: 2021-08-05
Admitting: INTERNAL MEDICINE

## 2021-08-05 VITALS
SYSTOLIC BLOOD PRESSURE: 157 MMHG | HEART RATE: 59 BPM | BODY MASS INDEX: 24.62 KG/M2 | WEIGHT: 172 LBS | HEIGHT: 70 IN | DIASTOLIC BLOOD PRESSURE: 78 MMHG

## 2021-08-05 DIAGNOSIS — R06.89 RESPIRATORY INSUFFICIENCY: ICD-10-CM

## 2021-08-05 DIAGNOSIS — I35.0 AORTIC STENOSIS, MODERATE: ICD-10-CM

## 2021-08-05 LAB
ASCENDING AORTA: 3.3 CM
BH CV ECHO MEAS - ACS: 2 CM
BH CV ECHO MEAS - AO MAX PG (FULL): 4.5 MMHG
BH CV ECHO MEAS - AO MAX PG: 7.2 MMHG
BH CV ECHO MEAS - AO MEAN PG (FULL): 1 MMHG
BH CV ECHO MEAS - AO MEAN PG: 3 MMHG
BH CV ECHO MEAS - AO ROOT AREA (BSA CORRECTED): 1.6
BH CV ECHO MEAS - AO ROOT AREA: 8 CM^2
BH CV ECHO MEAS - AO ROOT DIAM: 3.2 CM
BH CV ECHO MEAS - AO V2 MAX: 134 CM/SEC
BH CV ECHO MEAS - AO V2 MEAN: 80.7 CM/SEC
BH CV ECHO MEAS - AO V2 VTI: 31.2 CM
BH CV ECHO MEAS - AVA(I,A): 2.3 CM^2
BH CV ECHO MEAS - AVA(I,D): 2.3 CM^2
BH CV ECHO MEAS - AVA(V,A): 2.1 CM^2
BH CV ECHO MEAS - AVA(V,D): 2.1 CM^2
BH CV ECHO MEAS - BSA(HAYCOCK): 2 M^2
BH CV ECHO MEAS - BSA: 2 M^2
BH CV ECHO MEAS - BZI_BMI: 24.8 KILOGRAMS/M^2
BH CV ECHO MEAS - BZI_METRIC_HEIGHT: 177.8 CM
BH CV ECHO MEAS - BZI_METRIC_WEIGHT: 78.5 KG
BH CV ECHO MEAS - CI(CUBED): 1.9 L/MIN/M^2
BH CV ECHO MEAS - CI(MOD-SP2): 0.83 L/MIN/M^2
BH CV ECHO MEAS - CI(MOD-SP4): 1.2 L/MIN/M^2
BH CV ECHO MEAS - CI(TEICH): 1.8 L/MIN/M^2
BH CV ECHO MEAS - CO(CUBED): 3.8 L/MIN
BH CV ECHO MEAS - CO(MOD-SP2): 1.6 L/MIN
BH CV ECHO MEAS - CO(MOD-SP4): 2.3 L/MIN
BH CV ECHO MEAS - CO(TEICH): 3.5 L/MIN
BH CV ECHO MEAS - EDV(MOD-SP2): 58 ML
BH CV ECHO MEAS - EDV(MOD-SP4): 76 ML
BH CV ECHO MEAS - EDV(TEICH): 92.4 ML
BH CV ECHO MEAS - EF(CUBED): 80.7 %
BH CV ECHO MEAS - EF(MOD-BP): 59 %
BH CV ECHO MEAS - EF(MOD-SP2): 55.2 %
BH CV ECHO MEAS - EF(MOD-SP4): 60.5 %
BH CV ECHO MEAS - EF(TEICH): 73.4 %
BH CV ECHO MEAS - ESV(MOD-SP2): 26 ML
BH CV ECHO MEAS - ESV(MOD-SP4): 30 ML
BH CV ECHO MEAS - ESV(TEICH): 24.6 ML
BH CV ECHO MEAS - FS: 42.2 %
BH CV ECHO MEAS - IVS/LVPW: 1
BH CV ECHO MEAS - IVSD: 1.1 CM
BH CV ECHO MEAS - LV DIASTOLIC VOL/BSA (35-75): 38.7 ML/M^2
BH CV ECHO MEAS - LV MASS(C)D: 175 GRAMS
BH CV ECHO MEAS - LV MASS(C)DI: 89.2 GRAMS/M^2
BH CV ECHO MEAS - LV MAX PG: 2.7 MMHG
BH CV ECHO MEAS - LV MEAN PG: 2 MMHG
BH CV ECHO MEAS - LV SYSTOLIC VOL/BSA (12-30): 15.3 ML/M^2
BH CV ECHO MEAS - LV V1 MAX: 82.3 CM/SEC
BH CV ECHO MEAS - LV V1 MEAN: 60.7 CM/SEC
BH CV ECHO MEAS - LV V1 VTI: 20.3 CM
BH CV ECHO MEAS - LVIDD: 4.5 CM
BH CV ECHO MEAS - LVIDS: 2.6 CM
BH CV ECHO MEAS - LVLD AP2: 6.5 CM
BH CV ECHO MEAS - LVLD AP4: 7.2 CM
BH CV ECHO MEAS - LVLS AP2: 5.7 CM
BH CV ECHO MEAS - LVLS AP4: 5.4 CM
BH CV ECHO MEAS - LVOT AREA (M): 3.5 CM^2
BH CV ECHO MEAS - LVOT AREA: 3.5 CM^2
BH CV ECHO MEAS - LVOT DIAM: 2.1 CM
BH CV ECHO MEAS - LVPWD: 1.1 CM
BH CV ECHO MEAS - MM HR: 51 BPM
BH CV ECHO MEAS - MM R-R INT: 1.2 SEC
BH CV ECHO MEAS - MV A DUR: 0.13 SEC
BH CV ECHO MEAS - MV A MAX VEL: 41.5 CM/SEC
BH CV ECHO MEAS - MV DEC TIME: 0.22 SEC
BH CV ECHO MEAS - MV E MAX VEL: 58.3 CM/SEC
BH CV ECHO MEAS - MV E/A: 1.4
BH CV ECHO MEAS - PA MAX PG (FULL): 4.4 MMHG
BH CV ECHO MEAS - PA MAX PG: 5.2 MMHG
BH CV ECHO MEAS - PA V2 MAX: 114 CM/SEC
BH CV ECHO MEAS - PULM A REVS DUR: 0.13 SEC
BH CV ECHO MEAS - PULM A REVS VEL: 31.9 CM/SEC
BH CV ECHO MEAS - PULM DIAS VEL: 27.8 CM/SEC
BH CV ECHO MEAS - PULM S/D: 1.8
BH CV ECHO MEAS - PULM SYS VEL: 48.7 CM/SEC
BH CV ECHO MEAS - PVA(V,A): 1.6 CM^2
BH CV ECHO MEAS - PVA(V,D): 1.6 CM^2
BH CV ECHO MEAS - QP/QS: 0.72
BH CV ECHO MEAS - RAP SYSTOLE: 8 MMHG
BH CV ECHO MEAS - RV MAX PG: 0.75 MMHG
BH CV ECHO MEAS - RV MEAN PG: 1 MMHG
BH CV ECHO MEAS - RV V1 MAX: 43.3 CM/SEC
BH CV ECHO MEAS - RV V1 MEAN: 33.1 CM/SEC
BH CV ECHO MEAS - RV V1 VTI: 12.2 CM
BH CV ECHO MEAS - RVOT AREA: 4.2 CM^2
BH CV ECHO MEAS - RVOT DIAM: 2.3 CM
BH CV ECHO MEAS - RVSP: 34 MMHG
BH CV ECHO MEAS - SI(AO): 127.9 ML/M^2
BH CV ECHO MEAS - SI(CUBED): 37.5 ML/M^2
BH CV ECHO MEAS - SI(LVOT): 35.8 ML/M^2
BH CV ECHO MEAS - SI(MOD-SP2): 16.3 ML/M^2
BH CV ECHO MEAS - SI(MOD-SP4): 23.4 ML/M^2
BH CV ECHO MEAS - SI(TEICH): 34.6 ML/M^2
BH CV ECHO MEAS - SV(AO): 250.9 ML
BH CV ECHO MEAS - SV(CUBED): 73.5 ML
BH CV ECHO MEAS - SV(LVOT): 70.3 ML
BH CV ECHO MEAS - SV(MOD-SP2): 32 ML
BH CV ECHO MEAS - SV(MOD-SP4): 46 ML
BH CV ECHO MEAS - SV(RVOT): 50.7 ML
BH CV ECHO MEAS - SV(TEICH): 67.8 ML
BH CV ECHO MEAS - TAPSE (>1.6): 1.9 CM
BH CV ECHO MEAS - TR MAX VEL: 255 CM/SEC
BH CV XLRA - RV BASE: 4.2 CM
BH CV XLRA - RV LENGTH: 6.3 CM
BH CV XLRA - RV MID: 3.2 CM
BH CV XLRA - TDI S': 13 CM/SEC
LEFT ATRIUM VOLUME INDEX: 26 ML/M2
LEFT ATRIUM VOLUME: 51 CM3
MAXIMAL PREDICTED HEART RATE: 136 BPM
SINUS: 3 CM
STJ: 3 CM
STRESS TARGET HR: 116 BPM

## 2021-08-05 PROCEDURE — 93306 TTE W/DOPPLER COMPLETE: CPT | Performed by: INTERNAL MEDICINE

## 2021-08-05 PROCEDURE — 93306 TTE W/DOPPLER COMPLETE: CPT

## 2021-08-06 ENCOUNTER — LAB (OUTPATIENT)
Dept: LAB | Facility: HOSPITAL | Age: 86
End: 2021-08-06

## 2021-08-06 ENCOUNTER — TRANSCRIBE ORDERS (OUTPATIENT)
Dept: LAB | Facility: HOSPITAL | Age: 86
End: 2021-08-06

## 2021-08-06 DIAGNOSIS — E78.5 HYPERLIPIDEMIA, UNSPECIFIED HYPERLIPIDEMIA TYPE: ICD-10-CM

## 2021-08-06 DIAGNOSIS — R73.01 IMPAIRED FASTING GLUCOSE: ICD-10-CM

## 2021-08-06 DIAGNOSIS — G93.32 CHRONIC FATIGUE SYNDROME: ICD-10-CM

## 2021-08-06 DIAGNOSIS — E03.9 HYPOTHYROIDISM, UNSPECIFIED TYPE: ICD-10-CM

## 2021-08-06 DIAGNOSIS — E55.9 VITAMIN D DEFICIENCY: ICD-10-CM

## 2021-08-06 DIAGNOSIS — D50.9 IRON DEFICIENCY ANEMIA, UNSPECIFIED IRON DEFICIENCY ANEMIA TYPE: ICD-10-CM

## 2021-08-06 DIAGNOSIS — E55.9 VITAMIN D DEFICIENCY: Primary | ICD-10-CM

## 2021-08-06 LAB
25(OH)D3 SERPL-MCNC: 37.9 NG/ML
ALBUMIN SERPL-MCNC: 3.9 G/DL (ref 3.5–5.2)
ALBUMIN/GLOB SERPL: 2 G/DL
ALP SERPL-CCNC: 107 U/L (ref 39–117)
ALT SERPL W P-5'-P-CCNC: 9 U/L (ref 1–41)
ANION GAP SERPL CALCULATED.3IONS-SCNC: 10.6 MMOL/L (ref 5–15)
AST SERPL-CCNC: 20 U/L (ref 1–40)
BASOPHILS # BLD AUTO: 0.04 10*3/MM3 (ref 0–0.2)
BASOPHILS NFR BLD AUTO: 0.8 % (ref 0–1.5)
BILIRUB SERPL-MCNC: 0.3 MG/DL (ref 0–1.2)
BUN SERPL-MCNC: 21 MG/DL (ref 8–23)
BUN/CREAT SERPL: 22.1 (ref 7–25)
CALCIUM SPEC-SCNC: 9.1 MG/DL (ref 8.6–10.5)
CHLORIDE SERPL-SCNC: 104 MMOL/L (ref 98–107)
CHOLEST SERPL-MCNC: 169 MG/DL (ref 0–200)
CO2 SERPL-SCNC: 25.4 MMOL/L (ref 22–29)
CREAT SERPL-MCNC: 0.95 MG/DL (ref 0.76–1.27)
DEPRECATED RDW RBC AUTO: 44.2 FL (ref 37–54)
EOSINOPHIL # BLD AUTO: 0.13 10*3/MM3 (ref 0–0.4)
EOSINOPHIL NFR BLD AUTO: 2.5 % (ref 0.3–6.2)
ERYTHROCYTE [DISTWIDTH] IN BLOOD BY AUTOMATED COUNT: 12.5 % (ref 12.3–15.4)
GFR SERPL CREATININE-BSD FRML MDRD: 76 ML/MIN/1.73
GLOBULIN UR ELPH-MCNC: 2 GM/DL
GLUCOSE SERPL-MCNC: 103 MG/DL (ref 65–99)
HCT VFR BLD AUTO: 42.7 % (ref 37.5–51)
HDLC SERPL-MCNC: 53 MG/DL (ref 40–60)
HGB BLD-MCNC: 13.6 G/DL (ref 13–17.7)
IMM GRANULOCYTES # BLD AUTO: 0.01 10*3/MM3 (ref 0–0.05)
IMM GRANULOCYTES NFR BLD AUTO: 0.2 % (ref 0–0.5)
IRON 24H UR-MRATE: 83 MCG/DL (ref 59–158)
LDLC SERPL CALC-MCNC: 109 MG/DL (ref 0–100)
LDLC/HDLC SERPL: 2.06 {RATIO}
LYMPHOCYTES # BLD AUTO: 1.68 10*3/MM3 (ref 0.7–3.1)
LYMPHOCYTES NFR BLD AUTO: 32.8 % (ref 19.6–45.3)
MCH RBC QN AUTO: 30.4 PG (ref 26.6–33)
MCHC RBC AUTO-ENTMCNC: 31.9 G/DL (ref 31.5–35.7)
MCV RBC AUTO: 95.5 FL (ref 79–97)
MONOCYTES # BLD AUTO: 0.39 10*3/MM3 (ref 0.1–0.9)
MONOCYTES NFR BLD AUTO: 7.6 % (ref 5–12)
NEUTROPHILS NFR BLD AUTO: 2.87 10*3/MM3 (ref 1.7–7)
NEUTROPHILS NFR BLD AUTO: 56.1 % (ref 42.7–76)
NRBC BLD AUTO-RTO: 0 /100 WBC (ref 0–0.2)
PLATELET # BLD AUTO: 222 10*3/MM3 (ref 140–450)
PMV BLD AUTO: 11.1 FL (ref 6–12)
POTASSIUM SERPL-SCNC: 4.1 MMOL/L (ref 3.5–5.2)
PROT SERPL-MCNC: 5.9 G/DL (ref 6–8.5)
RBC # BLD AUTO: 4.47 10*6/MM3 (ref 4.14–5.8)
SODIUM SERPL-SCNC: 140 MMOL/L (ref 136–145)
T4 FREE SERPL-MCNC: 1.04 NG/DL (ref 0.93–1.7)
TRIGL SERPL-MCNC: 33 MG/DL (ref 0–150)
TSH SERPL DL<=0.05 MIU/L-ACNC: 6.59 UIU/ML (ref 0.27–4.2)
VLDLC SERPL-MCNC: 7 MG/DL (ref 5–40)
WBC # BLD AUTO: 5.12 10*3/MM3 (ref 3.4–10.8)

## 2021-08-06 PROCEDURE — 84443 ASSAY THYROID STIM HORMONE: CPT

## 2021-08-06 PROCEDURE — 83540 ASSAY OF IRON: CPT

## 2021-08-06 PROCEDURE — 36415 COLL VENOUS BLD VENIPUNCTURE: CPT

## 2021-08-06 PROCEDURE — 80061 LIPID PANEL: CPT

## 2021-08-06 PROCEDURE — 82306 VITAMIN D 25 HYDROXY: CPT

## 2021-08-06 PROCEDURE — 85025 COMPLETE CBC W/AUTO DIFF WBC: CPT

## 2021-08-06 PROCEDURE — 80053 COMPREHEN METABOLIC PANEL: CPT

## 2021-08-06 PROCEDURE — 84439 ASSAY OF FREE THYROXINE: CPT

## 2021-08-10 ENCOUNTER — TELEPHONE (OUTPATIENT)
Dept: INTERNAL MEDICINE | Facility: CLINIC | Age: 86
End: 2021-08-10

## 2021-08-10 RX ORDER — MELOXICAM 7.5 MG/1
7.5 TABLET ORAL DAILY PRN
Qty: 90 TABLET | Refills: 3 | Status: SHIPPED | OUTPATIENT
Start: 2021-08-10 | End: 2021-09-13 | Stop reason: SDUPTHER

## 2021-08-26 ENCOUNTER — OFFICE VISIT (OUTPATIENT)
Dept: CARDIOLOGY | Facility: CLINIC | Age: 86
End: 2021-08-26

## 2021-08-26 VITALS
HEIGHT: 70 IN | WEIGHT: 173 LBS | BODY MASS INDEX: 24.77 KG/M2 | HEART RATE: 56 BPM | DIASTOLIC BLOOD PRESSURE: 74 MMHG | SYSTOLIC BLOOD PRESSURE: 118 MMHG

## 2021-08-26 DIAGNOSIS — R06.89 RESPIRATORY INSUFFICIENCY: ICD-10-CM

## 2021-08-26 DIAGNOSIS — I35.1 TRACE AORTIC VALVE REGURGITATION: ICD-10-CM

## 2021-08-26 DIAGNOSIS — I10 ESSENTIAL HYPERTENSION: Primary | ICD-10-CM

## 2021-08-26 DIAGNOSIS — R60.0 BILATERAL LOWER EXTREMITY EDEMA: ICD-10-CM

## 2021-08-26 DIAGNOSIS — I51.89 GRADE I DIASTOLIC DYSFUNCTION: ICD-10-CM

## 2021-08-26 PROCEDURE — 99214 OFFICE O/P EST MOD 30 MIN: CPT | Performed by: INTERNAL MEDICINE

## 2021-08-26 RX ORDER — ASPIRIN 81 MG/1
1 TABLET ORAL DAILY
COMMUNITY
End: 2022-02-24

## 2021-08-26 NOTE — PROGRESS NOTES
"Chief Complaint  Hypertension    Subjective    History of Present Illness      I saw Sergei Owens today for continued cardiovascular care.  He is a pleasant and active 86-year-old male.  He is in good spirits and denies chest pain pressure tightness.  He does not experience any significant or progressive dyspnea on exertion.  He is free of orthopnea or PND.  He has chronic lower extremity edema is perhaps slightly improved.  He denies syncope near syncope or significant palpitations.Echocardiogram reviewed from 8/5/2021 reveals preserved left ventricular function, mild concentric left ventricular hypertrophy with grade 1 diastolic dysfunction, trace aortic regurgitation no significant aortic stenosis.  There was mild mitral tricuspid insufficiency with right ventricular systolic pressure 34 mmHg.    Objective   Vital Signs:   /74   Pulse 56   Ht 177.8 cm (70\")   Wt 78.5 kg (173 lb)   BMI 24.82 kg/m²     Constitutional:       Appearance: Well-developed.   Eyes:      Conjunctiva/sclera: Conjunctivae normal.      Pupils: Pupils are equal, round, and reactive to light.   HENT:      Head: Normocephalic and atraumatic.   Neck:      Thyroid: No thyromegaly.   Pulmonary:      Effort: Pulmonary effort is normal. No respiratory distress.      Breath sounds: Normal breath sounds. No wheezing. No rales.   Cardiovascular:      Normal rate. Regular rhythm.      Murmurs: There is a grade 2/6 crescendo-decrescendo systolic murmur at the URSB.      No gallop. No S3 and S4 gallop. Midsystolic click click.   Edema:     Peripheral edema present.     Ankle: bilateral trace edema of the ankle.  Abdominal:      General: Bowel sounds are normal. There is no distension.      Palpations: Abdomen is soft. There is no abdominal mass.      Tenderness: There is no abdominal tenderness.   Musculoskeletal: Normal range of motion.      Cervical back: Neck supple. Skin:     General: Skin is warm and dry.      Findings: No erythema. "   Neurological:      Mental Status: Alert and oriented to person, place, and time.         Result Review :     Common labs    Common Labsle 2/9/21 2/9/21 2/9/21 3/3/21 8/6/21 8/6/21 8/6/21    0942 0942 0942  0835 0835 0835   Glucose       103 (A)   Glucose  104 (A)  101 (A)      BUN  22  22   21   Creatinine  0.95  1.07   0.95   eGFR Non African Am       76   Sodium  140  140   140   Potassium  4.0  4.1   4.1   Chloride  103  102   104   Calcium  9.1  9.2   9.1   Albumin  3.7     3.90   Total Bilirubin  0.31     0.3   Alkaline Phosphatase  122     107   AST (SGOT)  24     20   ALT (SGPT)  15     9   WBC 5.22    5.12     Hemoglobin 14.0    13.6     Hematocrit 43.2    42.7     Platelets 214    222     Total Cholesterol      169    Total Cholesterol   181       Triglycerides   44   33    HDL Cholesterol   56   53    LDL Cholesterol    116 (A)   109 (A)    (A) Abnormal value       Comments are available for some flowsheets but are not being displayed.                     Assessment and Plan    1. Essential hypertension  Controlled    2. Grade I diastolic dysfunction  Mild volume excess    3. Bilateral lower extremity edema  Trace bilateral    4. Trace aortic valve regurgitation  Asymptomatic    5. Respiratory insufficiency  Stable    Mr. Owens is free of angina and his respiratory status overall is stable.  He demonstrates mild chronic bilateral lower extremity edema.  I encouraged him to remain active and keep his lower extremities elevated while seated.  Should his lower extremity edema increase we have discussed compression stockings.  I will see him in follow-up in 6 months sooner if needed        Follow Up   No follow-ups on file.  Patient was given instructions and counseling regarding his condition or for health maintenance advice. Please see specific information pulled into the AVS if appropriate.

## 2021-09-03 ENCOUNTER — PREP FOR SURGERY (OUTPATIENT)
Dept: OTHER | Facility: HOSPITAL | Age: 86
End: 2021-09-03

## 2021-09-03 ENCOUNTER — OFFICE VISIT (OUTPATIENT)
Dept: ORTHOPEDIC SURGERY | Facility: CLINIC | Age: 86
End: 2021-09-03

## 2021-09-03 VITALS — OXYGEN SATURATION: 99 % | WEIGHT: 170 LBS | HEART RATE: 72 BPM | BODY MASS INDEX: 23.8 KG/M2 | HEIGHT: 71 IN

## 2021-09-03 DIAGNOSIS — M17.12 PRIMARY OSTEOARTHRITIS OF LEFT KNEE: Primary | ICD-10-CM

## 2021-09-03 DIAGNOSIS — M25.562 LEFT KNEE PAIN, UNSPECIFIED CHRONICITY: ICD-10-CM

## 2021-09-03 PROCEDURE — 20610 DRAIN/INJ JOINT/BURSA W/O US: CPT | Performed by: ORTHOPAEDIC SURGERY

## 2021-09-03 PROCEDURE — 99213 OFFICE O/P EST LOW 20 MIN: CPT | Performed by: ORTHOPAEDIC SURGERY

## 2021-09-03 RX ADMIN — LIDOCAINE HYDROCHLORIDE 9 ML: 10 INJECTION, SOLUTION INFILTRATION; PERINEURAL at 16:18

## 2021-09-03 RX ADMIN — METHYLPREDNISOLONE ACETATE 80 MG: 80 INJECTION, SUSPENSION INTRA-ARTICULAR; INTRALESIONAL; INTRAMUSCULAR; SOFT TISSUE at 16:18

## 2021-09-03 NOTE — PROGRESS NOTES
"Chief Complaint  Initial Evaluation and Pain of the Left Knee     Subjective      Payam Owens presents to [facility] for an evaluation of left knee. Patient has no known injury or trauma to his left knee. He believes his knee pain is because of over-stressing it with running and jogging. Patient has taken a break from running and jogging and his knee pain has improved some. He states he is able to golf with only mild pain. He has pain along the medial joint line.     No Known Allergies     Social History     Socioeconomic History   • Marital status:      Spouse name: Not on file   • Number of children: Not on file   • Years of education: Not on file   • Highest education level: Not on file   Tobacco Use   • Smoking status: Never Smoker   • Smokeless tobacco: Never Used   Substance and Sexual Activity   • Alcohol use: Yes   • Drug use: Never   • Sexual activity: Defer        Review of Systems     Objective   Vital Signs:   Pulse 72   Ht 180.3 cm (71\")   Wt 77.1 kg (170 lb)   SpO2 99%   BMI 23.71 kg/m²       Physical Exam  Constitutional:       Appearance: Normal appearance. Patient is well-developed and normal weight.   HENT:      Head: Normocephalic.      Right Ear: Hearing and external ear normal.      Left Ear: Hearing and external ear normal.      Nose: Nose normal.   Eyes:      Conjunctiva/sclera: Conjunctivae normal.   Cardiovascular:      Rate and Rhythm: Normal rate.   Pulmonary:      Effort: Pulmonary effort is normal.      Breath sounds: No wheezing or rales.   Abdominal:      Palpations: Abdomen is soft.      Tenderness: There is no abdominal tenderness.   Musculoskeletal:      Cervical back: Normal range of motion.   Skin:     Findings: No rash.   Neurological:      Mental Status: Patient  is alert and oriented to person, place, and time.   Psychiatric:         Mood and Affect: Mood and affect normal.         Judgment: Judgment normal.       Ortho Exam      LEFT " KNEE: -5 degrees of extension. Flexion to 105 degrees. Tender patella. Tender medial joint line. Non-tender lateral joint line. Good strength to hamstrings, quadriceps, dorsiflexors and plantar flexors. Stable to varus/valgus stress. Negative Lachman. No swelling, skin discoloration or atrophy. Full weight bearing. Limping gait. Dorsal Pedal Pulse 2+, posterior tibialis pulse 2+.       Large Joint Arthrocentesis: L knee  Date/Time: 9/3/2021 4:18 PM  Consent given by: patient  Site marked: site marked  Timeout: Immediately prior to procedure a time out was called to verify the correct patient, procedure, equipment, support staff and site/side marked as required   Supporting Documentation  Indications: pain   Procedure Details  Location: knee - L knee  Needle size: 22 G  Medications administered: 9 mL lidocaine 1 %; 80 mg methylPREDNISolone acetate 80 MG/ML  Patient tolerance: patient tolerated the procedure well with no immediate complications              Imaging Results (Most Recent)     Procedure Component Value Units Date/Time    XR Knee 3 View Left [[id number]] Resulted: 09/03/21 1539     Updated: 09/03/21 1542           Result Review :       X-Ray Report:  Left knee(s) X-Ray  Indication: Evaluation of left knee pain   AP, Lateral and Standing view(s)  Findings: Advanced changes of the medial and patellofemoral compartment.   Prior studies available for comparison: no              Assessment and Plan     DX: Left knee osteoarthritis     Patient has bone-on-bone osteoarthritis, he is a candidate for a left total knee arthroplasty. Operative vs non-operative measures were discussed. Patient wishes to proceed with conservative management at this time. He was given a left knee steroid injection and tolerated this procedure well.     Discussed surgery., Risks/benefits discussed with patient including, but not limited to: infection, bleeding, neurovascular damage, malunion, nonunion, aesthetic deformity, need for  further surgery, and death., Discussed with patient the implant type being used during surgery and patient understands and desires to proceed. and Call or return if worsening symptoms.    Follow Up     4-6 weeks.        Patient was given instructions and counseling regarding his condition or for health maintenance advice. Please see specific information pulled into the AVS if appropriate.     Scribed for Russell Fernandez MD by Mis Amaral.  09/03/21   16:06 EDT    I have personally performed the services described in this document as scribed by the above individual and it is both accurate and complete. Russell Fernandez MD 09/08/21

## 2021-09-08 RX ORDER — METHYLPREDNISOLONE ACETATE 80 MG/ML
80 INJECTION, SUSPENSION INTRA-ARTICULAR; INTRALESIONAL; INTRAMUSCULAR; SOFT TISSUE
Status: COMPLETED | OUTPATIENT
Start: 2021-09-03 | End: 2021-09-03

## 2021-09-08 RX ORDER — LIDOCAINE HYDROCHLORIDE 10 MG/ML
9 INJECTION, SOLUTION INFILTRATION; PERINEURAL
Status: COMPLETED | OUTPATIENT
Start: 2021-09-03 | End: 2021-09-03

## 2021-09-10 PROBLEM — M17.9 OSTEOARTHRITIS OF KNEE: Status: ACTIVE | Noted: 2021-09-10

## 2021-09-10 PROBLEM — B95.8 STAPH INFECTION: Status: ACTIVE | Noted: 2021-09-10

## 2021-09-10 PROBLEM — K63.5 COLON POLYPS: Status: ACTIVE | Noted: 2021-09-10

## 2021-09-10 PROBLEM — N40.0 BENIGN PROSTATIC HYPERPLASIA: Status: ACTIVE | Noted: 2019-05-10

## 2021-09-10 PROBLEM — E55.9 VITAMIN D DEFICIENCY: Status: ACTIVE | Noted: 2021-09-10

## 2021-09-10 PROBLEM — E78.5 HYPERLIPIDEMIA: Status: ACTIVE | Noted: 2021-09-10

## 2021-09-10 PROBLEM — M19.049 ARTHROPATHY OF HAND: Status: ACTIVE | Noted: 2019-05-10

## 2021-09-10 PROBLEM — D50.9 IRON DEFICIENCY ANEMIA: Status: ACTIVE | Noted: 2021-09-10

## 2021-09-10 PROBLEM — R91.1 SOLITARY PULMONARY NODULE: Status: ACTIVE | Noted: 2021-09-10

## 2021-09-10 PROBLEM — G93.32 CHRONIC FATIGUE SYNDROME: Status: ACTIVE | Noted: 2021-09-10

## 2021-09-10 PROBLEM — E03.9 HYPOTHYROIDISM: Status: ACTIVE | Noted: 2021-09-10

## 2021-09-10 PROBLEM — N52.9 IMPOTENCE OF ORGANIC ORIGIN: Status: ACTIVE | Noted: 2018-07-19

## 2021-09-10 NOTE — PROGRESS NOTES
"Chief Complaint  Hyperlipidemia and Follow-up (He would like to talk about his blood work, especially his Thyroid number)    Subjective    History of Present Illness:  Payam Owens presents to [facility]  for follow-up chronic disease management.  The patient is not having any medication side effects. Labs were reviewed and discussed with the patient.  He has had an elevated TSH several times.  He has a 3 pound weight gain since his last visit 6 months ago.  He denies fatigue.  He reports that he exercises.  He reports that he is concerned about having memory issues.  He denies chest pain, shortness of air, abdominal pain, or change in bowel habits.  He is also followed by Dr. Fernandez and Dr. Molina.  Colonoscopy 2/2018 polyps removed per Dr. De Jesus.  Cardiac cath was done 2020.  Covid vaccine done 2021.    Objective   Vital Signs:   /74   Pulse 67   Temp 97.2 °F (36.2 °C)   Ht 177.8 cm (70\")   Wt 75.8 kg (167 lb)   SpO2 97%   BMI 23.96 kg/m²       Physical Exam :  General: Well nourished, well hydrated, in no acute distress  HEENT: Conjunctiva clear, no exudate bilateral  Neck: Supple, non-tender, no adenopathy, no bruit, no thyromegaly  Skin: Warm and dry  Cardiovascular: S1 S2, regular rate and rhythm, + slight murmur  Respiratory: Clear to auscultation bilateral, no wheezes, rhonchi, or rales  Chest: Normal shape, no deformity, normal work of breathing  Extremities: +1 pitting edema in bilateral lower extremities  Neurological: Alert, conversing normally  Psychological: Good eye contact, mood good, and judgment intact        Assessment and Plan:  Diagnoses and all orders for this visit:    1. Essential hypertension (Primary)  Assessment & Plan:  Hypertension is stable.  Continue current medications.  Blood pressure will be reassessed at the next regular appointment.    Orders:  -     Comprehensive Metabolic Panel; Future  -     losartan (COZAAR) 25 MG tablet; " Take 1 tablet by mouth Daily for 90 days.  Dispense: 90 tablet; Refill: 1  -     hydroCHLOROthiazide (HYDRODIURIL) 25 MG tablet; Take 1 tablet by mouth Daily for 90 days.  Dispense: 90 tablet; Refill: 1    2. Hyperlipidemia, unspecified hyperlipidemia type  Assessment & Plan:  Lipid abnormalities are stable.  Continue diet and exercise.  Lipids will be reassessed in 6 months.    Orders:  -     Lipid Panel; Future    3. Hypothyroidism, unspecified type  Comments:  Diagnosis, medication, and treatment discussed.  Recheck labs in 6 weeks and follow-up via phone.  Orders:  -     T4, Free; Future  -     TSH; Future  -     TSH; Future  -     T4, Free; Future  -     levothyroxine (SYNTHROID, LEVOTHROID) 25 MCG tablet; Take 1 tablet by mouth each morning on empty stomach with water only and wait 30-60 minutes before consuming any other liquids, food, or medications.  Dispense: 30 tablet; Refill: 1    4. Osteoarthritis of left knee, unspecified osteoarthritis type  Assessment & Plan:  Stable on medication and to continue.    Orders:  -     meloxicam (MOBIC) 7.5 MG tablet; Take 1 tablet by mouth Daily As Needed (as needed) for up to 90 days. with food.  Dispense: 90 tablet; Refill: 1    5. Impaired fasting glucose  Comments:  Discussed sugar in diet and to control. Will check Ha1c with next lab draw..  Orders:  -     Hemoglobin A1c; Future  -     Hemoglobin A1c; Future    6. History of iron deficiency anemia  Comments:  Stable on will continue to monitor.  Orders:  -     CBC & Differential; Future  -     Iron Profile; Future    7. Vitamin D deficiency  Assessment & Plan:  Stable and to continue to monitor.    Orders:  -     Vitamin D 25 Hydroxy; Future      Follow Up:  Patient was given instructions and counseling regarding condition. Return in about 6 months (around 3/13/2022) for Recheck.

## 2021-09-13 ENCOUNTER — OFFICE VISIT (OUTPATIENT)
Dept: INTERNAL MEDICINE | Facility: CLINIC | Age: 86
End: 2021-09-13

## 2021-09-13 VITALS
HEIGHT: 70 IN | BODY MASS INDEX: 23.91 KG/M2 | SYSTOLIC BLOOD PRESSURE: 128 MMHG | OXYGEN SATURATION: 97 % | WEIGHT: 167 LBS | TEMPERATURE: 97.2 F | DIASTOLIC BLOOD PRESSURE: 74 MMHG | HEART RATE: 67 BPM

## 2021-09-13 DIAGNOSIS — Z86.2 HISTORY OF IRON DEFICIENCY ANEMIA: ICD-10-CM

## 2021-09-13 DIAGNOSIS — I10 ESSENTIAL HYPERTENSION: Primary | Chronic | ICD-10-CM

## 2021-09-13 DIAGNOSIS — R73.01 IMPAIRED FASTING GLUCOSE: ICD-10-CM

## 2021-09-13 DIAGNOSIS — E55.9 VITAMIN D DEFICIENCY: ICD-10-CM

## 2021-09-13 DIAGNOSIS — E78.5 HYPERLIPIDEMIA, UNSPECIFIED HYPERLIPIDEMIA TYPE: ICD-10-CM

## 2021-09-13 DIAGNOSIS — E03.9 HYPOTHYROIDISM, UNSPECIFIED TYPE: ICD-10-CM

## 2021-09-13 DIAGNOSIS — M17.12 OSTEOARTHRITIS OF LEFT KNEE, UNSPECIFIED OSTEOARTHRITIS TYPE: ICD-10-CM

## 2021-09-13 PROCEDURE — 99214 OFFICE O/P EST MOD 30 MIN: CPT | Performed by: NURSE PRACTITIONER

## 2021-09-13 RX ORDER — LOSARTAN POTASSIUM 25 MG/1
25 TABLET ORAL DAILY
Qty: 90 TABLET | Refills: 1 | Status: SHIPPED | OUTPATIENT
Start: 2021-09-13 | End: 2022-04-27

## 2021-09-13 RX ORDER — LEVOTHYROXINE SODIUM 0.03 MG/1
TABLET ORAL
Qty: 30 TABLET | Refills: 1 | Status: SHIPPED | OUTPATIENT
Start: 2021-09-13 | End: 2021-09-13

## 2021-09-13 RX ORDER — MELOXICAM 7.5 MG/1
7.5 TABLET ORAL DAILY PRN
Qty: 90 TABLET | Refills: 1 | Status: SHIPPED | OUTPATIENT
Start: 2021-09-13 | End: 2021-12-12

## 2021-09-13 RX ORDER — LEVOTHYROXINE SODIUM 0.03 MG/1
TABLET ORAL
Qty: 30 TABLET | Refills: 1 | Status: SHIPPED | OUTPATIENT
Start: 2021-09-13 | End: 2021-11-15 | Stop reason: SDUPTHER

## 2021-09-13 RX ORDER — HYDROCHLOROTHIAZIDE 25 MG/1
25 TABLET ORAL DAILY
Qty: 90 TABLET | Refills: 1 | Status: SHIPPED | OUTPATIENT
Start: 2021-09-13 | End: 2022-04-27

## 2021-09-13 NOTE — ASSESSMENT & PLAN NOTE
Lipid abnormalities are stable.  Continue diet and exercise.  Lipids will be reassessed in 6 months.

## 2021-10-22 ENCOUNTER — OFFICE VISIT (OUTPATIENT)
Dept: ORTHOPEDIC SURGERY | Facility: CLINIC | Age: 86
End: 2021-10-22

## 2021-10-22 VITALS — BODY MASS INDEX: 23.91 KG/M2 | HEIGHT: 70 IN | OXYGEN SATURATION: 99 % | WEIGHT: 167 LBS | HEART RATE: 67 BPM

## 2021-10-22 DIAGNOSIS — M17.12 PRIMARY OSTEOARTHRITIS OF LEFT KNEE: Primary | ICD-10-CM

## 2021-10-22 PROCEDURE — 20610 DRAIN/INJ JOINT/BURSA W/O US: CPT | Performed by: ORTHOPAEDIC SURGERY

## 2021-10-22 PROCEDURE — 99213 OFFICE O/P EST LOW 20 MIN: CPT | Performed by: ORTHOPAEDIC SURGERY

## 2021-10-22 NOTE — PROGRESS NOTES
"Chief Complaint  Follow-up of the Left Knee     Subjective      Payam Owens presents to [facility] for follow up evaluation of the left knee. The patient has been treating his left knee osteoarthritis conservatively. He reports pain with jogging and walking on uneven ground. He has no new complaints. He reports the steroid injection only gave him short term relief.     No Known Allergies     Social History     Socioeconomic History   • Marital status:    Tobacco Use   • Smoking status: Never Smoker   • Smokeless tobacco: Never Used   Vaping Use   • Vaping Use: Never used   Substance and Sexual Activity   • Alcohol use: Yes   • Drug use: Never   • Sexual activity: Defer        Review of Systems     Objective   Vital Signs:   Pulse 67   Ht 177.8 cm (70\")   Wt 75.8 kg (167 lb)   SpO2 99%   BMI 23.96 kg/m²       Physical Exam  Constitutional:       Appearance: Normal appearance. The patient is well-developed and normal weight.   HENT:      Head: Normocephalic.      Right Ear: Hearing and external ear normal.      Left Ear: Hearing and external ear normal.      Nose: Nose normal.   Eyes:      Conjunctiva/sclera: Conjunctivae normal.   Cardiovascular:      Rate and Rhythm: Normal rate.   Pulmonary:      Effort: Pulmonary effort is normal.      Breath sounds: No wheezing or rales.   Abdominal:      Palpations: Abdomen is soft.      Tenderness: There is no abdominal tenderness.   Musculoskeletal:      Cervical back: Normal range of motion.   Skin:     Findings: No rash.   Neurological:      Mental Status: The patient is alert and oriented to person, place, and time.   Psychiatric:         Mood and Affect: Mood and affect normal.         Judgment: Judgment normal.       Ortho Exam      LEFT KNEE: -5 degrees of extension. Flexion to 105 degrees. Tender patella. Tender medial joint line. Non-tender lateral joint line. Good strength to hamstrings, quadriceps, dorsiflexors and plantar " flexors. Stable to varus/valgus stress. Negative Lachman. No swelling, skin discoloration or atrophy. Full weight bearing. Limping gait. Dorsal Pedal Pulse 2+, posterior tibialis pulse 2+.     Large Joint Arthrocentesis: L knee  Date/Time: 10/22/2021 2:53 PM  Consent given by: patient  Site marked: site marked  Timeout: Immediately prior to procedure a time out was called to verify the correct patient, procedure, equipment, support staff and site/side marked as required   Supporting Documentation  Indications: pain   Procedure Details  Location: knee - L knee  Preparation: Patient was prepped and draped in the usual sterile fashion  Needle size: 22 G  Medications administered: 48 mg hylan 48 MG/6ML  Patient tolerance: patient tolerated the procedure well with no immediate complications            Imaging Results (Most Recent)     None           Result Review :       No results found.           Assessment and Plan     DX: Left knee osteoarthritis     Discussed the risks and benefits of a left knee Synvisc injection. The patient expressed understanding and wished to proceed. He tolerated the injection well.     Call or return if worsening symptoms.    Follow Up     6 weeks      Patient was given instructions and counseling regarding his condition or for health maintenance advice. Please see specific information pulled into the AVS if appropriate.     Scribed for Russell Fernandez MD by Joselyn Szymanski.  10/22/21   14:45 EDT    I have personally performed the services described in this document as scribed by the above individual and it is both accurate and complete. Russell Fernandez MD 10/22/21

## 2021-11-11 ENCOUNTER — LAB (OUTPATIENT)
Dept: LAB | Facility: HOSPITAL | Age: 86
End: 2021-11-11

## 2021-11-11 DIAGNOSIS — E03.9 HYPOTHYROIDISM, UNSPECIFIED TYPE: ICD-10-CM

## 2021-11-11 DIAGNOSIS — R73.01 IMPAIRED FASTING GLUCOSE: ICD-10-CM

## 2021-11-11 LAB
HBA1C MFR BLD: 5.78 % (ref 4.8–5.6)
T4 FREE SERPL-MCNC: 1.1 NG/DL (ref 0.93–1.7)
TSH SERPL DL<=0.05 MIU/L-ACNC: 3.32 UIU/ML (ref 0.27–4.2)

## 2021-11-11 PROCEDURE — 83036 HEMOGLOBIN GLYCOSYLATED A1C: CPT

## 2021-11-11 PROCEDURE — 36415 COLL VENOUS BLD VENIPUNCTURE: CPT

## 2021-11-11 PROCEDURE — 84443 ASSAY THYROID STIM HORMONE: CPT

## 2021-11-11 PROCEDURE — 84439 ASSAY OF FREE THYROXINE: CPT

## 2021-11-15 ENCOUNTER — TELEPHONE (OUTPATIENT)
Dept: INTERNAL MEDICINE | Facility: CLINIC | Age: 86
End: 2021-11-15

## 2021-11-15 DIAGNOSIS — E03.9 HYPOTHYROIDISM, UNSPECIFIED TYPE: ICD-10-CM

## 2021-11-15 RX ORDER — LEVOTHYROXINE SODIUM 0.03 MG/1
TABLET ORAL
Qty: 90 TABLET | Refills: 1 | Status: SHIPPED | OUTPATIENT
Start: 2021-11-15 | End: 2021-12-22 | Stop reason: SDUPTHER

## 2021-11-15 RX ORDER — LEVOTHYROXINE SODIUM 0.03 MG/1
TABLET ORAL
Qty: 90 TABLET | Refills: 1 | Status: SHIPPED | OUTPATIENT
Start: 2021-11-15 | End: 2021-11-15 | Stop reason: SDUPTHER

## 2021-11-15 NOTE — TELEPHONE ENCOUNTER
The pts wife called wanting to know if he needed to be on his thyroid medication anymore? hes almost out she says, so if he does need to be on it he will need a refill.

## 2021-12-22 DIAGNOSIS — E03.9 HYPOTHYROIDISM, UNSPECIFIED TYPE: ICD-10-CM

## 2021-12-22 RX ORDER — LEVOTHYROXINE SODIUM 0.03 MG/1
TABLET ORAL
Qty: 90 TABLET | Refills: 1 | Status: SHIPPED | OUTPATIENT
Start: 2021-12-22 | End: 2021-12-23 | Stop reason: SDUPTHER

## 2021-12-23 ENCOUNTER — TELEPHONE (OUTPATIENT)
Dept: INTERNAL MEDICINE | Facility: CLINIC | Age: 86
End: 2021-12-23

## 2021-12-23 DIAGNOSIS — E03.9 HYPOTHYROIDISM, UNSPECIFIED TYPE: ICD-10-CM

## 2021-12-23 RX ORDER — LEVOTHYROXINE SODIUM 0.03 MG/1
TABLET ORAL
Qty: 90 TABLET | Refills: 1 | Status: SHIPPED | OUTPATIENT
Start: 2021-12-23 | End: 2022-04-27 | Stop reason: SDUPTHER

## 2021-12-23 RX ORDER — LEVOTHYROXINE SODIUM 0.03 MG/1
TABLET ORAL
Qty: 90 TABLET | Refills: 1 | Status: SHIPPED | OUTPATIENT
Start: 2021-12-23 | End: 2021-12-23 | Stop reason: SDUPTHER

## 2022-01-17 ENCOUNTER — TELEPHONE (OUTPATIENT)
Dept: NEUROSURGERY | Facility: CLINIC | Age: 87
End: 2022-01-17

## 2022-02-23 NOTE — PROGRESS NOTES
"Chief Complaint  Hypertension    Subjective    History of Present Illness      I saw Payam Owens today for cardiovascular care.  He is a pleasant 87-year-old male who is very active.  He exercises routinely and remains free of chest pains pressure tightness.  He does not experience any significant dyspnea on exertion nor does he report orthopnea or PND.  He has chronic lower extremity edema which overall is improved.  He is using compression stockings which has helped.  He denies syncope near syncope or any significant palpitations.  Echocardiogram from 8/5/2021 revealed left ventricular ejection fraction 59%, mild concentric left ventricular hypertrophy with grade 1 diastolic dysfunction, right ventricular systolic pressure 34 mmHg.There is trace aortic regurgitation, mild mitral and tricuspid regurgitation.    Objective   Vital Signs:   /60   Pulse 72   Ht 177.8 cm (70\")   Wt 79.4 kg (175 lb)   BMI 25.11 kg/m²     Constitutional:       Appearance: Well-developed.   Eyes:      Conjunctiva/sclera: Conjunctivae normal.      Pupils: Pupils are equal, round, and reactive to light.   HENT:      Head: Normocephalic and atraumatic.   Neck:      Thyroid: No thyromegaly.   Pulmonary:      Effort: Pulmonary effort is normal. No respiratory distress.      Breath sounds: Normal breath sounds. No wheezing. No rales.   Cardiovascular:      Normal rate. Regular rhythm.      Murmurs: There is a grade 1/6 crescendo-decrescendo systolic murmur at the URSB.      No gallop. No S3 and S4 gallop. No rub.   Edema:     Peripheral edema present.     Pretibial: bilateral trace edema of the pretibial area.  Abdominal:      General: Bowel sounds are normal. There is no distension.      Palpations: Abdomen is soft. There is no abdominal mass.      Tenderness: There is no abdominal tenderness.   Musculoskeletal: Normal range of motion.      Cervical back: Neck supple. Skin:     General: Skin is warm and dry.      Findings: No " erythema.   Neurological:      Mental Status: Alert and oriented to person, place, and time.         Result Review :     Common labs    Common Labsle 3/3/21 8/6/21 8/6/21 8/6/21 11/11/21     0835 0835 0835    Glucose 101 (A)   103 (A)    BUN 22   21    Creatinine 1.07   0.95    eGFR Non African Am    76    Sodium 140   140    Potassium 4.1   4.1    Chloride 102   104    Calcium 9.2   9.1    Albumin    3.90    Total Bilirubin    0.3    Alkaline Phosphatase    107    AST (SGOT)    20    ALT (SGPT)    9    WBC  5.12      Hemoglobin  13.6      Hematocrit  42.7      Platelets  222      Total Cholesterol   169     Triglycerides   33     HDL Cholesterol   53     LDL Cholesterol    109 (A)     Hemoglobin A1C     5.78 (A)   (A) Abnormal value                      Assessment and Plan    1. Essential hypertension  Controlled.    2. Grade I diastolic dysfunction  Compensated    3. Trace aortic valve regurgitation  Compensated    4. Bilateral lower extremity edema  Improved        Payam  is active free of angina and does not report any significant respiratory insufficiency.  His bilateral lower extremity edema is improved.  He will continue his current medical regimen.  I have recommended he continue using compression stockings and elevate his lower extremities while seated.  I will see him in follow-up in 6 months sooner if needed        Follow Up   No follow-ups on file.  Patient was given instructions and counseling regarding his condition or for health maintenance advice. Please see specific information pulled into the AVS if appropriate.

## 2022-02-24 ENCOUNTER — OFFICE VISIT (OUTPATIENT)
Dept: CARDIOLOGY | Facility: CLINIC | Age: 87
End: 2022-02-24

## 2022-02-24 VITALS
SYSTOLIC BLOOD PRESSURE: 132 MMHG | HEIGHT: 70 IN | WEIGHT: 175 LBS | DIASTOLIC BLOOD PRESSURE: 60 MMHG | HEART RATE: 72 BPM | BODY MASS INDEX: 25.05 KG/M2

## 2022-02-24 DIAGNOSIS — I35.1 TRACE AORTIC VALVE REGURGITATION: ICD-10-CM

## 2022-02-24 DIAGNOSIS — I10 ESSENTIAL HYPERTENSION: Primary | ICD-10-CM

## 2022-02-24 DIAGNOSIS — I51.89 GRADE I DIASTOLIC DYSFUNCTION: ICD-10-CM

## 2022-02-24 DIAGNOSIS — R60.0 BILATERAL LOWER EXTREMITY EDEMA: ICD-10-CM

## 2022-02-24 PROCEDURE — 99214 OFFICE O/P EST MOD 30 MIN: CPT | Performed by: INTERNAL MEDICINE

## 2022-02-24 RX ORDER — MELOXICAM 7.5 MG/1
1 TABLET ORAL DAILY
COMMUNITY
Start: 2021-12-21 | End: 2022-04-27 | Stop reason: SDUPTHER

## 2022-04-14 ENCOUNTER — LAB (OUTPATIENT)
Dept: LAB | Facility: HOSPITAL | Age: 87
End: 2022-04-14

## 2022-04-14 DIAGNOSIS — E55.9 VITAMIN D DEFICIENCY: ICD-10-CM

## 2022-04-14 DIAGNOSIS — I10 ESSENTIAL HYPERTENSION: Chronic | ICD-10-CM

## 2022-04-14 DIAGNOSIS — E03.9 HYPOTHYROIDISM, UNSPECIFIED TYPE: ICD-10-CM

## 2022-04-14 DIAGNOSIS — Z86.2 HISTORY OF IRON DEFICIENCY ANEMIA: ICD-10-CM

## 2022-04-14 DIAGNOSIS — E78.5 HYPERLIPIDEMIA, UNSPECIFIED HYPERLIPIDEMIA TYPE: ICD-10-CM

## 2022-04-14 DIAGNOSIS — R73.01 IMPAIRED FASTING GLUCOSE: ICD-10-CM

## 2022-04-14 PROBLEM — T14.8XXA HEMATOMA: Status: ACTIVE | Noted: 2017-08-03

## 2022-04-14 PROBLEM — R60.0 LOCALIZED EDEMA: Status: ACTIVE | Noted: 2019-05-10

## 2022-04-14 LAB
25(OH)D3 SERPL-MCNC: 32.9 NG/ML (ref 30–100)
ALBUMIN SERPL-MCNC: 3.9 G/DL (ref 3.5–5.2)
ALBUMIN/GLOB SERPL: 1.7 G/DL
ALP SERPL-CCNC: 98 U/L (ref 39–117)
ALT SERPL W P-5'-P-CCNC: 18 U/L (ref 1–41)
ANION GAP SERPL CALCULATED.3IONS-SCNC: 9 MMOL/L (ref 5–15)
AST SERPL-CCNC: 25 U/L (ref 1–40)
BASOPHILS # BLD AUTO: 0.04 10*3/MM3 (ref 0–0.2)
BASOPHILS NFR BLD AUTO: 0.8 % (ref 0–1.5)
BILIRUB SERPL-MCNC: 0.3 MG/DL (ref 0–1.2)
BUN SERPL-MCNC: 22 MG/DL (ref 8–23)
BUN/CREAT SERPL: 20.2 (ref 7–25)
CALCIUM SPEC-SCNC: 9 MG/DL (ref 8.6–10.5)
CHLORIDE SERPL-SCNC: 106 MMOL/L (ref 98–107)
CHOLEST SERPL-MCNC: 161 MG/DL (ref 0–200)
CO2 SERPL-SCNC: 27 MMOL/L (ref 22–29)
CREAT SERPL-MCNC: 1.09 MG/DL (ref 0.76–1.27)
DEPRECATED RDW RBC AUTO: 43.2 FL (ref 37–54)
EGFRCR SERPLBLD CKD-EPI 2021: 66.5 ML/MIN/1.73
EOSINOPHIL # BLD AUTO: 0.21 10*3/MM3 (ref 0–0.4)
EOSINOPHIL NFR BLD AUTO: 4.4 % (ref 0.3–6.2)
ERYTHROCYTE [DISTWIDTH] IN BLOOD BY AUTOMATED COUNT: 12.4 % (ref 12.3–15.4)
GLOBULIN UR ELPH-MCNC: 2.3 GM/DL
GLUCOSE SERPL-MCNC: 93 MG/DL (ref 65–99)
HBA1C MFR BLD: 5.9 % (ref 4.8–5.6)
HCT VFR BLD AUTO: 40.6 % (ref 37.5–51)
HDLC SERPL-MCNC: 51 MG/DL (ref 40–60)
HGB BLD-MCNC: 13.3 G/DL (ref 13–17.7)
IMM GRANULOCYTES # BLD AUTO: 0.02 10*3/MM3 (ref 0–0.05)
IMM GRANULOCYTES NFR BLD AUTO: 0.4 % (ref 0–0.5)
IRON 24H UR-MRATE: 103 MCG/DL (ref 59–158)
IRON SATN MFR SERPL: 30 % (ref 20–50)
LDLC SERPL CALC-MCNC: 102 MG/DL (ref 0–100)
LDLC/HDLC SERPL: 2.02 {RATIO}
LYMPHOCYTES # BLD AUTO: 1.51 10*3/MM3 (ref 0.7–3.1)
LYMPHOCYTES NFR BLD AUTO: 31.5 % (ref 19.6–45.3)
MCH RBC QN AUTO: 31.1 PG (ref 26.6–33)
MCHC RBC AUTO-ENTMCNC: 32.8 G/DL (ref 31.5–35.7)
MCV RBC AUTO: 95.1 FL (ref 79–97)
MONOCYTES # BLD AUTO: 0.31 10*3/MM3 (ref 0.1–0.9)
MONOCYTES NFR BLD AUTO: 6.5 % (ref 5–12)
NEUTROPHILS NFR BLD AUTO: 2.7 10*3/MM3 (ref 1.7–7)
NEUTROPHILS NFR BLD AUTO: 56.4 % (ref 42.7–76)
NRBC BLD AUTO-RTO: 0 /100 WBC (ref 0–0.2)
PLATELET # BLD AUTO: 200 10*3/MM3 (ref 140–450)
PMV BLD AUTO: 11 FL (ref 6–12)
POTASSIUM SERPL-SCNC: 4.2 MMOL/L (ref 3.5–5.2)
PROT SERPL-MCNC: 6.2 G/DL (ref 6–8.5)
RBC # BLD AUTO: 4.27 10*6/MM3 (ref 4.14–5.8)
SODIUM SERPL-SCNC: 142 MMOL/L (ref 136–145)
T4 FREE SERPL-MCNC: 1.18 NG/DL (ref 0.93–1.7)
TIBC SERPL-MCNC: 344 MCG/DL (ref 298–536)
TRANSFERRIN SERPL-MCNC: 231 MG/DL (ref 200–360)
TRIGL SERPL-MCNC: 34 MG/DL (ref 0–150)
TSH SERPL DL<=0.05 MIU/L-ACNC: 4.51 UIU/ML (ref 0.27–4.2)
VLDLC SERPL-MCNC: 8 MG/DL (ref 5–40)
WBC NRBC COR # BLD: 4.79 10*3/MM3 (ref 3.4–10.8)

## 2022-04-14 PROCEDURE — 84439 ASSAY OF FREE THYROXINE: CPT

## 2022-04-14 PROCEDURE — 83036 HEMOGLOBIN GLYCOSYLATED A1C: CPT

## 2022-04-14 PROCEDURE — 83540 ASSAY OF IRON: CPT

## 2022-04-14 PROCEDURE — 85025 COMPLETE CBC W/AUTO DIFF WBC: CPT

## 2022-04-14 PROCEDURE — 84443 ASSAY THYROID STIM HORMONE: CPT

## 2022-04-14 PROCEDURE — 84466 ASSAY OF TRANSFERRIN: CPT

## 2022-04-14 PROCEDURE — 36415 COLL VENOUS BLD VENIPUNCTURE: CPT

## 2022-04-14 PROCEDURE — 82306 VITAMIN D 25 HYDROXY: CPT

## 2022-04-14 PROCEDURE — 80061 LIPID PANEL: CPT

## 2022-04-14 PROCEDURE — 80053 COMPREHEN METABOLIC PANEL: CPT

## 2022-04-27 ENCOUNTER — OFFICE VISIT (OUTPATIENT)
Dept: INTERNAL MEDICINE | Facility: CLINIC | Age: 87
End: 2022-04-27

## 2022-04-27 VITALS
TEMPERATURE: 98.2 F | DIASTOLIC BLOOD PRESSURE: 75 MMHG | SYSTOLIC BLOOD PRESSURE: 126 MMHG | BODY MASS INDEX: 24.91 KG/M2 | RESPIRATION RATE: 20 BRPM | HEIGHT: 70 IN | HEART RATE: 65 BPM | WEIGHT: 174 LBS | OXYGEN SATURATION: 100 %

## 2022-04-27 DIAGNOSIS — G89.29 NECK PAIN, CHRONIC: ICD-10-CM

## 2022-04-27 DIAGNOSIS — E55.9 VITAMIN D DEFICIENCY: ICD-10-CM

## 2022-04-27 DIAGNOSIS — M54.2 NECK PAIN, CHRONIC: ICD-10-CM

## 2022-04-27 DIAGNOSIS — I10 ESSENTIAL HYPERTENSION: Primary | Chronic | ICD-10-CM

## 2022-04-27 DIAGNOSIS — M17.12 OSTEOARTHRITIS OF LEFT KNEE, UNSPECIFIED OSTEOARTHRITIS TYPE: ICD-10-CM

## 2022-04-27 DIAGNOSIS — E03.9 HYPOTHYROIDISM, UNSPECIFIED TYPE: ICD-10-CM

## 2022-04-27 DIAGNOSIS — R73.01 IMPAIRED FASTING GLUCOSE: ICD-10-CM

## 2022-04-27 PROCEDURE — 99214 OFFICE O/P EST MOD 30 MIN: CPT | Performed by: NURSE PRACTITIONER

## 2022-04-27 RX ORDER — LEVOTHYROXINE SODIUM 0.03 MG/1
TABLET ORAL
Qty: 90 TABLET | Refills: 3 | Status: SHIPPED | OUTPATIENT
Start: 2022-04-27

## 2022-04-27 RX ORDER — LISINOPRIL AND HYDROCHLOROTHIAZIDE 25; 20 MG/1; MG/1
1 TABLET ORAL DAILY
Qty: 90 TABLET | Refills: 3 | Status: SHIPPED | OUTPATIENT
Start: 2022-04-27 | End: 2022-08-25 | Stop reason: DRUGHIGH

## 2022-04-27 RX ORDER — MELOXICAM 7.5 MG/1
7.5 TABLET ORAL DAILY
Qty: 90 TABLET | Refills: 3 | Status: SHIPPED | OUTPATIENT
Start: 2022-04-27

## 2022-04-27 NOTE — PROGRESS NOTES
Chief Complaint  Labs Only (Patient is here for 6 month follow up with labs. Pt did express concerns about sugars.), Med Refill (Patient needing refill on hydrochlorothiazide. Patient wanting to know if he can get a losartan-HCTZ tablet instead of taking both pills. Pt also wanting to know if there are Copay cards to help with the price of that which I told pt I would look into during his visit.//Thyroid medication. Pt wants to know if he can try to come off this medication.), and Neck Pain (Patient fell 18ft of zipline. He was flown to [facility]./Pt states he is still having trouble with his neck that goes down into his back and shoulder blades./Pt was seen at Dr. Gamble office for a couple of visits. Patient thinks he would benefit from PT. Referral pending patient wanting to go to [facility] in [place])     Subjective        History of Present Illness  Payam Owens is a 87 y.o. male who presents to [facility] for follow-up of hypertension, hyperlipidemia, hypothyroidism, osteoarthritis of the left knee, impaired fasting glucose, history of iron deficiency anemia, and vitamin D deficiency. Recent labs were reviewed. The patient is not having any medication side effects.  Negative for chest pain, shortness of air, dizziness, headaches, or fatigue.  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  He would like to do physical therapy now and is requesting an order.  The patient is very active and runs 20+ miles a week.    He is also followed by Dr. Fernandez and Dr. Molina.  Colonoscopy 2/2018 polyps removed per Dr. De Jesus.  Cardiac cath was done 2020.  Covid vaccine done 2021.    Past Medical History:   Diagnosis Date   • Arthritis    • Back problem     Back Decompression 1990   • Breathing problem     patient had breathing problem 2 years ago   • Chronic  fatigue    • Edema    • Enlarged prostate    • Essential hypertension 06/08/2020   • History of echocardiogram 05/29/2020    EF = 66.0%, borderline concentric hypertrophy, diastolic dysfunction (grade I), moderate calcification of the aortic valve, moderate calcification of the aortic valve.Trace-to-mild aortic valve regurgitation, mild to moderate aortic stenosis, Mild MR/TR/AR   • Hyperlipidemia, unspecified    • Hypothyroidism, unspecified    • Iron deficiency anemia, unspecified    • Lung nodule    • Neck injury 2021   • Prostate disorder     tuna of prostate Dr. Torre [id number]   • Unilateral primary osteoarthritis, left knee    • Vitamin D deficiency, unspecified         Past Surgical History:   Procedure Laterality Date   • BACK SURGERY      back fusion 1992   • CARDIAC CATHETERIZATION N/A 7/21/2020    Procedure: Right and Left Heart Cath;  Surgeon: Collin Harvey MD;  Location:  [facility];  Service: Cardiology;  Laterality: N/A;   • CARDIAC CATHETERIZATION N/A 7/21/2020    Procedure: Left ventriculography;  Surgeon: Collin Harvey MD;  Location:  [facility];  Service: Cardiology;  Laterality: N/A;   • CARDIAC CATHETERIZATION N/A 7/21/2020    Procedure: Coronary angiography;  Surgeon: Collin Harvey MD;  Location:  [facility];  Service: Cardiology;  Laterality: N/A;   • COLONOSCOPY  02/2018   • PROSTATE SURGERY          No Known Allergies       Current Outpatient Medications:   •  cyanocobalamin (VITAMIN B-12) 1000 MCG tablet, Take 1 tablet by mouth Daily., Disp: , Rfl:   •  GLUCOSA-CHONDR-NA CHONDR-MSM PO, Take 1 capsule by mouth Daily., Disp: , Rfl:   •  levothyroxine (SYNTHROID, LEVOTHROID) 25 MCG tablet, Take 1 tablet by mouth each morning on empty stomach with water only and wait 30 to 60 minutes before consuming any other liquids, food, or medications., Disp: 90 tablet, Rfl: 3  •  meloxicam (MOBIC) 7.5 MG tablet, Take 1 tablet by mouth Daily.,  "Disp: 90 tablet, Rfl: 3  •  NON FORMULARY, The patient takes super beta prostate supplement that is over-the-counter, Disp: , Rfl:   •  lisinopril-hydrochlorothiazide (PRINZIDE,ZESTORETIC) 20-25 MG per tablet, Take 1 tablet by mouth Daily., Disp: 90 tablet, Rfl: 3    Objective   /75 (BP Location: Left arm, Patient Position: Sitting, Cuff Size: Large Adult)   Pulse 65   Temp 98.2 °F (36.8 °C) (Temporal)   Resp 20   Ht 177.8 cm (70\")   Wt 78.9 kg (174 lb)   SpO2 100%   BMI 24.97 kg/m²    Estimated body mass index is 24.97 kg/m² as calculated from the following:    Height as of this encounter: 177.8 cm (70\").    Weight as of this encounter: 78.9 kg (174 lb).   Physical Exam  Vitals reviewed.   Constitutional:       General: He is not in acute distress.  HENT:      Head: Normocephalic and atraumatic.   Neck:      Comments: No thyroid enlargement  Cardiovascular:      Rate and Rhythm: Normal rate and regular rhythm.      Heart sounds: Murmur heard.   Pulmonary:      Effort: Pulmonary effort is normal.      Breath sounds: Normal breath sounds. No wheezing, rhonchi or rales.   Musculoskeletal:      Cervical back: No bony tenderness. Decreased range of motion.      Thoracic back: No bony tenderness.      Right lower le+ Edema present.      Left lower le+ Edema present.   Lymphadenopathy:      Cervical: No cervical adenopathy.   Skin:     General: Skin is warm and dry.   Neurological:      General: No focal deficit present.      Mental Status: He is alert.   Psychiatric:         Thought Content: Thought content normal.        Result Review :   The following data was reviewed by: CHAPO Lafleur on 2022:  CMP    CMP 21   Glucose 103 (A) 93   BUN 21 22   Creatinine 0.95 1.09   eGFR Non African Am 76    Sodium 140 142   Potassium 4.1 4.2   Chloride 104 106   Calcium 9.1 9.0   Albumin 3.90 3.90   Total Bilirubin 0.3 0.3   Alkaline Phosphatase 107 98   AST (SGOT) 20 25   ALT (SGPT) 9 " 18   (A) Abnormal value            CBC w/diff    CBC w/Diff 8/6/21 4/14/22   WBC 5.12 4.79   RBC 4.47 4.27   Hemoglobin 13.6 13.3   Hematocrit 42.7 40.6   MCV 95.5 95.1   MCH 30.4 31.1   MCHC 31.9 32.8   RDW 12.5 12.4   Platelets 222 200   Neutrophil Rel % 56.1 56.4   Immature Granulocyte Rel % 0.2 0.4   Lymphocyte Rel % 32.8 31.5   Monocyte Rel % 7.6 6.5   Eosinophil Rel % 2.5 4.4   Basophil Rel % 0.8 0.8           Lipid Panel    Lipid Panel 8/6/21 4/14/22   Total Cholesterol 169 161   Triglycerides 33 34   HDL Cholesterol 53 51   VLDL Cholesterol 7 8   LDL Cholesterol  109 (A) 102 (A)   LDL/HDL Ratio 2.06 2.02   (A) Abnormal value            TSH    TSH 8/6/21 11/11/21 4/14/22   TSH 6.590 (A) 3.320 4.510 (A)   (A) Abnormal value            A1C Last 3 Results    HGBA1C Last 3 Results 11/11/21 4/14/22   Hemoglobin A1C 5.78 (A) 5.90 (A)   (A) Abnormal value          Iron Profile (04/14/2022 09:53)  Vitamin D 25 Hydroxy (04/14/2022 09:53)         Assessment and Plan    Diagnoses and all orders for this visit:    1. Essential hypertension (Primary)  -     Comprehensive Metabolic Panel; Future  -     CBC & Differential; Future  -     Lipid Panel; Future    2. Hypothyroidism, unspecified type  -     levothyroxine (SYNTHROID, LEVOTHROID) 25 MCG tablet; Take 1 tablet by mouth each morning on empty stomach with water only and wait 30 to 60 minutes before consuming any other liquids, food, or medications.  Dispense: 90 tablet; Refill: 3  -     T4, Free; Future  -     TSH; Future    3. Neck pain, chronic  -     Ambulatory Referral to Physical Therapy Evaluate and treat    4. Osteoarthritis of left knee, unspecified osteoarthritis type    5. Impaired fasting glucose  -     Hemoglobin A1c; Future    6. Vitamin D deficiency  -     Vitamin D 25 Hydroxy; Future    Other orders  -     lisinopril-hydrochlorothiazide (PRINZIDE,ZESTORETIC) 20-25 MG per tablet; Take 1 tablet by mouth Daily.  Dispense: 90 tablet; Refill: 3  -      meloxicam (MOBIC) 7.5 MG tablet; Take 1 tablet by mouth Daily.  Dispense: 90 tablet; Refill: 3    1.  Hypertension.  The patient would like to have a combination blood pressure medication.  Stop hydrochlorothiazide 25 mg daily and losartan 25 mg daily.  Start lisinopril hydrochlorothiazide 20-25 mg daily.  The patient is to monitor his blood pressures and let me know if they are not stable.    2.  Hypothyroidism.  Improved labs on levothyroxine 25 mg daily.  The patient does not have any medication side effects.  Continue medication recheck labs in 6 months.     3.  Chronic neck pain.  The patient wants to try physical therapy.  Order for evaluate and treat his neck pain.  He was told to follow-up with the neurosurgeon if he is not improving or if he has worsening or new symptoms.    4.  Osteoarthritis.  The patient is stable on meloxicam 7.5 mg in glistening conjoint and over-the-counter supplement.  Kidney function normal on labs.  Continue medication.    5.  Impaired fasting glucose.  Worsening and hemoglobin A1c 5.90.  Discussed diet and exercise.  The patient is very active and exercises and is cautious with his diet.  Recheck lab in 6 months.    6.  Vitamin D level and iron profile levels are normal.  Monitor.    Follow Up     Patient was given instructions and counseling regarding his condition or for health maintenance advice. Please see specific information pulled into the AVS if appropriate.   Return in about 6 months (around 10/27/2022) for Medicare Wellness, Recheck.    CHAPO Lafleur

## 2022-08-24 NOTE — PROGRESS NOTES
"Chief Complaint  Hypertension    Subjective    History of Present Illness      I saw Payam Owens today for cardiovascular care.  He is a active 87-year-old male who remains free of chest discomfort or respiratory insufficiency.  He does not experience orthopnea, PND but does have chronic mild lower extremity bilateral edema.  He is free of syncope he does not report any significant orthostatic dizziness no palpitations.  His blood pressure is marginal today at 96/52.  He states that he tries to hydrate well but when he checks his blood pressure at home it is remained around 100 mmHg systolic.  Previous echocardiogram obtained 8/5/2021 revealed left ventricular ejection fraction 59%, there mild concentric left ventricular hypertrophy with grade 1 diastolic dysfunction.  There is trace aortic regurgitation.  There was aortic valve sclerosis without stenosis.    Objective   Vital Signs:   BP 96/52   Pulse 64   Ht 177.8 cm (70\")   Wt 72.1 kg (159 lb)   BMI 22.81 kg/m²     Constitutional:       Appearance: Well-developed.   Eyes:      Conjunctiva/sclera: Conjunctivae normal.      Pupils: Pupils are equal, round, and reactive to light.   HENT:      Head: Normocephalic and atraumatic.   Neck:      Thyroid: No thyromegaly.   Pulmonary:      Effort: Pulmonary effort is normal. No respiratory distress.      Breath sounds: Normal breath sounds. No wheezing. No rales.   Cardiovascular:      Normal rate. Regular rhythm.      Murmurs: There is a grade 1/6 crescendo-decrescendo systolic murmur at the URSB.      No gallop. No S3 and S4 gallop. No rub.   Edema:     Peripheral edema present.     Pretibial: trace edema of the left pretibial area.  Abdominal:      General: Bowel sounds are normal. There is no distension.      Palpations: Abdomen is soft. There is no abdominal mass.      Tenderness: There is no abdominal tenderness.   Musculoskeletal: Normal range of motion.      Cervical back: Neck supple. Skin:     General: Skin " is warm and dry.      Findings: No erythema.   Neurological:      Mental Status: Alert and oriented to person, place, and time.         Result Review :     Common labs    Common Labsle 11/11/21 4/14/22 4/14/22 4/14/22 4/14/22     [id number]   Glucose    93    BUN    22    Creatinine    1.09    Sodium    142    Potassium    4.2    Chloride    106    Calcium    9.0    Albumin    3.90    Total Bilirubin    0.3    Alkaline Phosphatase    98    AST (SGOT)    25    ALT (SGPT)    18    WBC  4.79      Hemoglobin  13.3      Hematocrit  40.6      Platelets  200      Total Cholesterol     161   Triglycerides     34   HDL Cholesterol     51   LDL Cholesterol      102 (A)   Hemoglobin A1C 5.78 (A)  5.90 (A)     (A) Abnormal value                      Assessment and Plan    1. Trace aortic valve regurgitation  Stable    2. Essential hypertension  Relative hypotension  - lisinopril-hydrochlorothiazide (PRINZIDE,ZESTORETIC) 10-12.5 MG per tablet; Take 1 tablet by mouth Daily.  Dispense: 90 tablet; Refill: 3    3. Grade I diastolic dysfunction  Stable    4. Mixed hyperlipidemia  Stable    5. Localized edema  Stable    Payam is active, free of angina and his volume status remains stable.  I will reduce lisinopril HCTZ from 20-25 mg daily to 10-12.5 mg daily.  He will monitor his blood pressure at home and any symptoms of orthostatic dizziness.  He will report any symptoms to us.  We will plan to see him in follow-up in 6 months.        Follow Up   No follow-ups on file.  Patient was given instructions and counseling regarding his condition or for health maintenance advice. Please see specific information pulled into the AVS if appropriate.

## 2022-08-25 ENCOUNTER — OFFICE VISIT (OUTPATIENT)
Dept: CARDIOLOGY | Facility: CLINIC | Age: 87
End: 2022-08-25

## 2022-08-25 VITALS
WEIGHT: 159 LBS | HEART RATE: 64 BPM | DIASTOLIC BLOOD PRESSURE: 52 MMHG | HEIGHT: 70 IN | SYSTOLIC BLOOD PRESSURE: 96 MMHG | BODY MASS INDEX: 22.76 KG/M2

## 2022-08-25 DIAGNOSIS — I10 ESSENTIAL HYPERTENSION: Chronic | ICD-10-CM

## 2022-08-25 DIAGNOSIS — I35.1 TRACE AORTIC VALVE REGURGITATION: Primary | ICD-10-CM

## 2022-08-25 DIAGNOSIS — R60.0 LOCALIZED EDEMA: ICD-10-CM

## 2022-08-25 DIAGNOSIS — E78.2 MIXED HYPERLIPIDEMIA: ICD-10-CM

## 2022-08-25 DIAGNOSIS — I51.89 GRADE I DIASTOLIC DYSFUNCTION: Chronic | ICD-10-CM

## 2022-08-25 PROCEDURE — 99214 OFFICE O/P EST MOD 30 MIN: CPT | Performed by: INTERNAL MEDICINE

## 2022-08-25 RX ORDER — LISINOPRIL AND HYDROCHLOROTHIAZIDE 12.5; 1 MG/1; MG/1
1 TABLET ORAL DAILY
Qty: 90 TABLET | Refills: 3 | Status: SHIPPED | OUTPATIENT
Start: 2022-08-25

## 2022-09-28 ENCOUNTER — CLINICAL SUPPORT (OUTPATIENT)
Dept: INTERNAL MEDICINE | Facility: CLINIC | Age: 87
End: 2022-09-28

## 2022-09-28 DIAGNOSIS — E03.9 HYPOTHYROIDISM, UNSPECIFIED TYPE: ICD-10-CM

## 2022-09-28 DIAGNOSIS — E55.9 VITAMIN D DEFICIENCY: ICD-10-CM

## 2022-09-28 DIAGNOSIS — Z23 NEED FOR VACCINATION: Primary | ICD-10-CM

## 2022-09-28 DIAGNOSIS — R73.01 IMPAIRED FASTING GLUCOSE: ICD-10-CM

## 2022-09-28 DIAGNOSIS — I10 ESSENTIAL HYPERTENSION: ICD-10-CM

## 2022-09-28 LAB
25(OH)D3 SERPL-MCNC: 41.4 NG/ML (ref 30–100)
ALBUMIN SERPL-MCNC: 3.8 G/DL (ref 3.5–5.2)
ALBUMIN/GLOB SERPL: 1.7 G/DL
ALP SERPL-CCNC: 103 U/L (ref 39–117)
ALT SERPL W P-5'-P-CCNC: 13 U/L (ref 1–41)
ANION GAP SERPL CALCULATED.3IONS-SCNC: 7 MMOL/L (ref 5–15)
AST SERPL-CCNC: 23 U/L (ref 1–40)
BASOPHILS # BLD AUTO: 0.05 10*3/MM3 (ref 0–0.2)
BASOPHILS NFR BLD AUTO: 1.2 % (ref 0–1.5)
BILIRUB SERPL-MCNC: 0.2 MG/DL (ref 0–1.2)
BUN SERPL-MCNC: 24 MG/DL (ref 8–23)
BUN/CREAT SERPL: 24.7 (ref 7–25)
CALCIUM SPEC-SCNC: 9.2 MG/DL (ref 8.6–10.5)
CHLORIDE SERPL-SCNC: 104 MMOL/L (ref 98–107)
CHOLEST SERPL-MCNC: 158 MG/DL (ref 0–200)
CO2 SERPL-SCNC: 27 MMOL/L (ref 22–29)
CREAT SERPL-MCNC: 0.97 MG/DL (ref 0.76–1.27)
DEPRECATED RDW RBC AUTO: 45.9 FL (ref 37–54)
EGFRCR SERPLBLD CKD-EPI 2021: 76.5 ML/MIN/1.73
EOSINOPHIL # BLD AUTO: 0.16 10*3/MM3 (ref 0–0.4)
EOSINOPHIL NFR BLD AUTO: 3.8 % (ref 0.3–6.2)
ERYTHROCYTE [DISTWIDTH] IN BLOOD BY AUTOMATED COUNT: 13.2 % (ref 12.3–15.4)
GLOBULIN UR ELPH-MCNC: 2.2 GM/DL
GLUCOSE SERPL-MCNC: 96 MG/DL (ref 65–99)
HBA1C MFR BLD: 5.9 % (ref 4.8–5.6)
HCT VFR BLD AUTO: 36.7 % (ref 37.5–51)
HDLC SERPL-MCNC: 53 MG/DL (ref 40–60)
HGB BLD-MCNC: 12 G/DL (ref 13–17.7)
IMM GRANULOCYTES # BLD AUTO: 0.01 10*3/MM3 (ref 0–0.05)
IMM GRANULOCYTES NFR BLD AUTO: 0.2 % (ref 0–0.5)
LDLC SERPL CALC-MCNC: 97 MG/DL (ref 0–100)
LDLC/HDLC SERPL: 1.85 {RATIO}
LYMPHOCYTES # BLD AUTO: 1.45 10*3/MM3 (ref 0.7–3.1)
LYMPHOCYTES NFR BLD AUTO: 34.4 % (ref 19.6–45.3)
MCH RBC QN AUTO: 30.8 PG (ref 26.6–33)
MCHC RBC AUTO-ENTMCNC: 32.7 G/DL (ref 31.5–35.7)
MCV RBC AUTO: 94.3 FL (ref 79–97)
MONOCYTES # BLD AUTO: 0.31 10*3/MM3 (ref 0.1–0.9)
MONOCYTES NFR BLD AUTO: 7.3 % (ref 5–12)
NEUTROPHILS NFR BLD AUTO: 2.24 10*3/MM3 (ref 1.7–7)
NEUTROPHILS NFR BLD AUTO: 53.1 % (ref 42.7–76)
NRBC BLD AUTO-RTO: 0 /100 WBC (ref 0–0.2)
PLATELET # BLD AUTO: 204 10*3/MM3 (ref 140–450)
PMV BLD AUTO: 10.7 FL (ref 6–12)
POTASSIUM SERPL-SCNC: 4.5 MMOL/L (ref 3.5–5.2)
PROT SERPL-MCNC: 6 G/DL (ref 6–8.5)
RBC # BLD AUTO: 3.89 10*6/MM3 (ref 4.14–5.8)
SODIUM SERPL-SCNC: 138 MMOL/L (ref 136–145)
T4 FREE SERPL-MCNC: 1.1 NG/DL (ref 0.93–1.7)
TRIGL SERPL-MCNC: 35 MG/DL (ref 0–150)
TSH SERPL DL<=0.05 MIU/L-ACNC: 3.4 UIU/ML (ref 0.27–4.2)
VLDLC SERPL-MCNC: 8 MG/DL (ref 5–40)
WBC NRBC COR # BLD: 4.22 10*3/MM3 (ref 3.4–10.8)

## 2022-09-28 PROCEDURE — 80061 LIPID PANEL: CPT | Performed by: NURSE PRACTITIONER

## 2022-09-28 PROCEDURE — 85025 COMPLETE CBC W/AUTO DIFF WBC: CPT | Performed by: NURSE PRACTITIONER

## 2022-09-28 PROCEDURE — G0008 ADMIN INFLUENZA VIRUS VAC: HCPCS | Performed by: NURSE PRACTITIONER

## 2022-09-28 PROCEDURE — 36415 COLL VENOUS BLD VENIPUNCTURE: CPT | Performed by: NURSE PRACTITIONER

## 2022-09-28 PROCEDURE — 84443 ASSAY THYROID STIM HORMONE: CPT | Performed by: NURSE PRACTITIONER

## 2022-09-28 PROCEDURE — 84439 ASSAY OF FREE THYROXINE: CPT | Performed by: NURSE PRACTITIONER

## 2022-09-28 PROCEDURE — 90662 IIV NO PRSV INCREASED AG IM: CPT | Performed by: NURSE PRACTITIONER

## 2022-09-28 PROCEDURE — 82306 VITAMIN D 25 HYDROXY: CPT | Performed by: NURSE PRACTITIONER

## 2022-09-28 PROCEDURE — 83036 HEMOGLOBIN GLYCOSYLATED A1C: CPT | Performed by: NURSE PRACTITIONER

## 2022-09-28 PROCEDURE — 80053 COMPREHEN METABOLIC PANEL: CPT | Performed by: NURSE PRACTITIONER

## 2022-10-05 ENCOUNTER — OFFICE VISIT (OUTPATIENT)
Dept: INTERNAL MEDICINE | Facility: CLINIC | Age: 87
End: 2022-10-05

## 2022-10-05 ENCOUNTER — HOSPITAL ENCOUNTER (OUTPATIENT)
Dept: GENERAL RADIOLOGY | Facility: HOSPITAL | Age: 87
Discharge: HOME | End: 2022-10-05
Admitting: NURSE PRACTITIONER

## 2022-10-05 VITALS
WEIGHT: 163.8 LBS | HEIGHT: 70 IN | SYSTOLIC BLOOD PRESSURE: 130 MMHG | TEMPERATURE: 97.8 F | BODY MASS INDEX: 23.45 KG/M2 | DIASTOLIC BLOOD PRESSURE: 70 MMHG | OXYGEN SATURATION: 99 % | HEART RATE: 59 BPM

## 2022-10-05 DIAGNOSIS — E03.9 HYPOTHYROIDISM, UNSPECIFIED TYPE: ICD-10-CM

## 2022-10-05 DIAGNOSIS — M54.6 THORACIC BACK PAIN, UNSPECIFIED BACK PAIN LATERALITY, UNSPECIFIED CHRONICITY: ICD-10-CM

## 2022-10-05 DIAGNOSIS — I10 ESSENTIAL HYPERTENSION: Chronic | ICD-10-CM

## 2022-10-05 DIAGNOSIS — R60.0 BILATERAL LOWER EXTREMITY EDEMA: Chronic | ICD-10-CM

## 2022-10-05 DIAGNOSIS — E55.9 VITAMIN D DEFICIENCY: Chronic | ICD-10-CM

## 2022-10-05 DIAGNOSIS — H61.23 BILATERAL IMPACTED CERUMEN: ICD-10-CM

## 2022-10-05 DIAGNOSIS — Z00.00 ENCOUNTER FOR ANNUAL WELLNESS EXAM IN MEDICARE PATIENT: Primary | ICD-10-CM

## 2022-10-05 DIAGNOSIS — R73.01 IMPAIRED FASTING GLUCOSE: ICD-10-CM

## 2022-10-05 DIAGNOSIS — M17.12 OSTEOARTHRITIS OF LEFT KNEE, UNSPECIFIED OSTEOARTHRITIS TYPE: Chronic | ICD-10-CM

## 2022-10-05 PROCEDURE — G0439 PPPS, SUBSEQ VISIT: HCPCS | Performed by: NURSE PRACTITIONER

## 2022-10-05 PROCEDURE — 1159F MED LIST DOCD IN RCRD: CPT | Performed by: NURSE PRACTITIONER

## 2022-10-05 PROCEDURE — 99214 OFFICE O/P EST MOD 30 MIN: CPT | Performed by: NURSE PRACTITIONER

## 2022-10-05 PROCEDURE — 1170F FXNL STATUS ASSESSED: CPT | Performed by: NURSE PRACTITIONER

## 2022-10-05 PROCEDURE — 69210 REMOVE IMPACTED EAR WAX UNI: CPT | Performed by: NURSE PRACTITIONER

## 2022-10-05 PROCEDURE — 71101 X-RAY EXAM UNILAT RIBS/CHEST: CPT

## 2022-10-05 RX ORDER — MULTIPLE VITAMINS W/ MINERALS TAB 9MG-400MCG
1 TAB ORAL DAILY
COMMUNITY

## 2022-10-05 RX ORDER — ZINC 25 MG
TABLET ORAL
COMMUNITY

## 2022-10-05 NOTE — PROGRESS NOTES
Negative for rib fractures on the right.  He has multiple healed left-sided rib fractures.  Tell him to let me know if the pain is not improved over the next 1 to 2 weeks.

## 2022-10-05 NOTE — PROGRESS NOTES
The ABCs of the Annual Wellness Visit  Subsequent Medicare Wellness Visit    Chief Complaint   Patient presents with   • Medicare Wellness-subsequent   • Back Pain     The patient is complaining of back pain, for a little over 2 weeks. He states he lifted some heavy rocks. And now when he tries to play golf it really bothers him. He says he takes tylenol for the pain.       Subjective    History of Present Illness:  Payam Owens is a 87 y.o. male who presents for:     1.  A Subsequent Medicare Wellness Visit.     2. Complaint of pain to right mid back for 4 weeks. The pain is made worse with twisting motion. He reports he did some lifting of heavy rocks a month ago then played golf and that is when the pain started. He takes Mobic for arthritis. He reports Tylenol helps the pain. He has used intermittent heating pad.    3. Follow-up of hypertension, hyperlipidemia, hypothyroidism, osteoarthritis of the left knee, impaired fasting glucose and vitamin D deficiency. Recent labs were reviewed. The patient is not having any medication side effects.  Negative for chest pain, shortness of air, dizziness, headaches and fatigue. The patient is very active and runs 20+ miles a week.  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      Specialist: Dr. Fernandez and Dr. Molina.  Cardiac cath was done 2020.  Colonoscopy 2/2018 polyps removed per Dr. De Jesus.      The following portions of the patient's history were reviewed and   updated as appropriate:   He  has a past medical history of Arthritis, Back problem, Breathing problem, Chronic fatigue, Edema, Enlarged prostate, Essential hypertension (06/08/2020), History of echocardiogram (05/29/2020), Hyperlipidemia, unspecified, Hypothyroidism, unspecified, Iron deficiency anemia, unspecified, Lung nodule, Neck injury (2021), Prostate disorder, Unilateral primary osteoarthritis,  left knee, and Vitamin D deficiency, unspecified.  He has Essential hypertension; Bilateral lower extremity edema; Hyperlipidemia; Hypothyroidism; Iron deficiency anemia; Primary osteoarthritis of left knee; and Vitamin D deficiency on their pertinent problem list.  He  has a past surgical history that includes Back surgery; Prostate surgery; Cardiac catheterization (N/A, 7/21/2020); Cardiac catheterization (N/A, 7/21/2020); Cardiac catheterization (N/A, 7/21/2020); and Colonoscopy (02/2018).  His family history includes Breast cancer in his mother; COPD in his brother; Dementia in his father; Heart failure in his mother.  He  reports that he has never smoked. He has never used smokeless tobacco. He reports current alcohol use. He reports that he does not use drugs.  Current Outpatient Medications   Medication Sig Dispense Refill   • cyanocobalamin (VITAMIN B-12) 1000 MCG tablet Take 1 tablet by mouth Daily.     • GLUCOSA-CHONDR-NA CHONDR-MSM PO Take 1 capsule by mouth Daily.     • levothyroxine (SYNTHROID, LEVOTHROID) 25 MCG tablet Take 1 tablet by mouth each morning on empty stomach with water only and wait 30 to 60 minutes before consuming any other liquids, food, or medications. 90 tablet 3   • lisinopril-hydrochlorothiazide (PRINZIDE,ZESTORETIC) 10-12.5 MG per tablet Take 1 tablet by mouth Daily. 90 tablet 3   • meloxicam (MOBIC) 7.5 MG tablet Take 1 tablet by mouth Daily. 90 tablet 3   • multivitamin with minerals tablet tablet Take 1 tablet by mouth Daily.     • Zinc 25 MG tablet Take  by mouth.       No current facility-administered medications for this visit.     Current Outpatient Medications on File Prior to Visit   Medication Sig   • cyanocobalamin (VITAMIN B-12) 1000 MCG tablet Take 1 tablet by mouth Daily.   • GLUCOSA-CHONDR-NA CHONDR-MSM PO Take 1 capsule by mouth Daily.   • levothyroxine (SYNTHROID, LEVOTHROID) 25 MCG tablet Take 1 tablet by mouth each morning on empty stomach with water only and  wait 30 to 60 minutes before consuming any other liquids, food, or medications.   • lisinopril-hydrochlorothiazide (PRINZIDE,ZESTORETIC) 10-12.5 MG per tablet Take 1 tablet by mouth Daily.   • meloxicam (MOBIC) 7.5 MG tablet Take 1 tablet by mouth Daily.   • multivitamin with minerals tablet tablet Take 1 tablet by mouth Daily.   • Zinc 25 MG tablet Take  by mouth.     No current facility-administered medications on file prior to visit.     He has No Known Allergies..    Compared to one year ago, the patient feels his physical   health is worse. Slowing down some.    Compared to one year ago, the patient feels his mental   health is worse. Short term memory a little worse.     Recent Hospitalizations:  He was not admitted to the hospital during the last year.       Current Medical Providers:  Patient Care Team:  Jamila Loco APRN as PCP - General (Nurse Practitioner)  Collin Harvey MD as Consulting Physician (Cardiology)    Outpatient Medications Prior to Visit   Medication Sig Dispense Refill   • cyanocobalamin (VITAMIN B-12) 1000 MCG tablet Take 1 tablet by mouth Daily.     • GLUCOSA-CHONDR-NA CHONDR-MSM PO Take 1 capsule by mouth Daily.     • levothyroxine (SYNTHROID, LEVOTHROID) 25 MCG tablet Take 1 tablet by mouth each morning on empty stomach with water only and wait 30 to 60 minutes before consuming any other liquids, food, or medications. 90 tablet 3   • lisinopril-hydrochlorothiazide (PRINZIDE,ZESTORETIC) 10-12.5 MG per tablet Take 1 tablet by mouth Daily. 90 tablet 3   • meloxicam (MOBIC) 7.5 MG tablet Take 1 tablet by mouth Daily. 90 tablet 3   • multivitamin with minerals tablet tablet Take 1 tablet by mouth Daily.     • Zinc 25 MG tablet Take  by mouth.       No facility-administered medications prior to visit.       No opioid medication identified on active medication list. I have reviewed chart for other potential  high risk medication/s and harmful drug interactions in the  "elderly.          Aspirin is not on active medication list.  Aspirin use is not indicated based on review of current medical condition/s. Risk of harm outweighs potential benefits.  .    Patient Active Problem List   Diagnosis   • Essential hypertension   • Aortic stenosis, mild   • Respiratory insufficiency   • Bilateral lower extremity edema   • Grade I diastolic dysfunction   • Closed fracture of transverse process of thoracic vertebra (HCC)   • Cervical strain, acute, sequela   • Cervical spondylosis without myelopathy   • Arthropathy of hand   • Benign prostatic hyperplasia   • Chronic fatigue syndrome   • Colon polyps   • Hyperlipidemia   • Hypothyroidism   • Impotence of organic origin   • Iron deficiency anemia   • Primary osteoarthritis of left knee   • Solitary pulmonary nodule   • Staph infection   • Vitamin D deficiency   • Localized edema   • Hematoma     Advance Care Planning  Advance Directive is not on file.  ACP discussion was held with the patient during this visit. Patient does not have an advance directive, declines further assistance.          Objective    Vitals:    10/05/22 1134   BP: 130/70   BP Location: Left arm   Patient Position: Sitting   Cuff Size: Large Adult   Pulse: 59   Temp: 97.8 °F (36.6 °C)   TempSrc: Temporal   SpO2: 99%   Weight: 74.3 kg (163 lb 12.8 oz)   Height: 177.8 cm (70\")     Estimated body mass index is 23.5 kg/m² as calculated from the following:    Height as of this encounter: 177.8 cm (70\").    Weight as of this encounter: 74.3 kg (163 lb 12.8 oz).    BMI is within normal parameters. No other follow-up for BMI required.      Does the patient have evidence of cognitive impairment? No    Physical Exam  Vitals reviewed.   Constitutional:       General: He is not in acute distress.     Appearance: Normal appearance.   HENT:      Head: Normocephalic and atraumatic.      Right Ear: There is impacted cerumen.      Left Ear: Tympanic membrane normal. There is impacted " cerumen.   Eyes:      Conjunctiva/sclera: Conjunctivae normal.   Cardiovascular:      Rate and Rhythm: Normal rate and regular rhythm.      Heart sounds: Murmur heard.   Pulmonary:      Effort: Pulmonary effort is normal.      Breath sounds: Normal breath sounds. No wheezing, rhonchi or rales.   Abdominal:      General: Abdomen is flat. There is no distension.      Palpations: Abdomen is soft. There is no mass.      Tenderness: There is no abdominal tenderness.   Musculoskeletal:      Thoracic back: Tenderness present.      Right lower leg: 3+ Edema present.      Left lower leg: 3+ Edema present.   Lymphadenopathy:      Cervical: No cervical adenopathy.   Skin:     General: Skin is warm and dry.      Coloration: Skin is not jaundiced or pale.   Neurological:      General: No focal deficit present.      Mental Status: He is alert.   Psychiatric:         Mood and Affect: Mood normal.         Thought Content: Thought content normal.     Ear Cerumen Removal    Date/Time: 10/5/2022 12:18 PM  Performed by: Jamila Loco APRN  Authorized by: Jamila Loco APRN     Anesthesia:  Local Anesthetic: none  Location details: left ear and right ear  Procedure type: instrumentation, irrigation   Sedation:  Patient sedated: no          Lab Results   Component Value Date    TRIG 35 09/28/2022    HDL 53 09/28/2022    LDL 97 09/28/2022    VLDL 8 09/28/2022    HGBA1C 5.90 (H) 09/28/2022            HEALTH RISK ASSESSMENT    Smoking Status:  Social History     Tobacco Use   Smoking Status Never Smoker   Smokeless Tobacco Never Used     Alcohol Consumption:  Social History     Substance and Sexual Activity   Alcohol Use Yes     Fall Risk Screen:    STEADI Fall Risk Assessment was completed, and patient is at HIGH risk for falls. Assessment completed on:4/27/2022    Depression Screening:  PHQ-2/PHQ-9 Depression Screening 10/5/2022   Retired PHQ-9 Total Score -   Retired Total Score -   Little Interest or Pleasure in Doing Things  0-->not at all   Feeling Down, Depressed or Hopeless 0-->not at all   Trouble Falling or Staying Asleep, or Sleeping Too Much -   Feeling Tired or Having Little Energy -   Poor Appetite or Overeating -   Feeling Bad about Yourself - or that You are a Failure or Have Let Yourself or Your Family Down -   Trouble Concentrating on Things, Such as Reading the Newspaper or Watching Television -   Moving or Speaking So Slowly that Other People Could Have Noticed? Or the Opposite - Being So Fidgety -   Thoughts that You Would be Better Off Dead or of Hurting Yourself in Some Way -   PHQ-9: Brief Depression Severity Measure Score 0       Health Habits and Functional and Cognitive Screening:  Functional & Cognitive Status 10/5/2022   Do you have difficulty preparing food and eating? No   Do you have difficulty bathing yourself, getting dressed or grooming yourself? No   Do you have difficulty using the toilet? No   Do you have difficulty moving around from place to place? No   Do you have trouble with steps or getting out of a bed or a chair? No   Current Diet Well Balanced Diet   Dental Exam Up to date   Eye Exam Up to date   Exercise (times per week) 5 times per week   Current Exercises Include Yard Work;Walking;Other        Exercise Comment patient golfs frequently   Do you need help using the phone?  No   Are you deaf or do you have serious difficulty hearing?  No   Do you need help with transportation? No   Do you need help shopping? No   Do you need help preparing meals?  No   Do you need help with housework?  No   Do you need help with laundry? No   Do you need help taking your medications? No   Do you need help managing money? No   Do you ever drive or ride in a car without wearing a seat belt? No   Have you felt unusual stress, anger or loneliness in the last month? No   Who do you live with? Spouse   If you need help, do you have trouble finding someone available to you? Yes   Have you been bothered in the last four  weeks by sexual problems? No   Do you have difficulty concentrating, remembering or making decisions? No       Age-appropriate Screening Schedule:  Refer to the list below for future screening recommendations based on patient's age, sex and/or medical conditions. Orders for these recommended tests are listed in the plan section. The patient has been provided with a written plan.    Health Maintenance   Topic Date Due   • ZOSTER VACCINE (1 of 2) Never done   • LIPID PANEL  09/28/2023   • TDAP/TD VACCINES (2 - Tdap) 01/18/2029   • INFLUENZA VACCINE  Completed              Assessment & Plan   CMS Preventative Services Quick Reference  Risk Factors Identified During Encounter  Immunizations Discussed/Encouraged (specific Immunizations; Prevnar 20 (Pneumococcal 20-valent conjugate), Shingrix and COVID19  The above risks/problems have been discussed with the patient.  Follow up actions/plans if indicated are seen below in the Assessment/Plan Section.  Pertinent information has been shared with the patient in the After Visit Summary.    Diagnoses and all orders for this visit:    1. Encounter for annual wellness exam in Medicare patient (Primary)  Comments:  Care gaps reviewed. Flu vaccine today and agrees to get others needed.    2. Thoracic back pain, unspecified back pain laterality, unspecified chronicity  Comments:  Differential diagnosis discussed. Pain with twisting motion. XR to rule out rib fracture. Treat with ice/heat & stretches. RTC in 2 weeks not improving.  Orders:  -     XR Ribs Right With PA Chest; Future    3. Bilateral lower extremity edema  Comments:  Worsening. Pt admits not wearing compression stocks which help edema. Encouraged to wear. He declines increased dose HCTZ.    4. Essential hypertension  Comments:  BP well-controlled on lisinopril-HCTZ 10-12.5 mg daily and to continue.   Orders:  -     Comprehensive Metabolic Panel; Future  -     CBC & Differential; Future    5. Osteoarthritis of left  knee, unspecified osteoarthritis type  Comments:  Stable on Mobic 7.5 mg and renal function normal.    6. Hypothyroidism, unspecified type  Comments:  Pt reports feeling good on med. Improved on levothyroxine 25 mcg daily and TSH improved.  Orders:  -     T4, Free; Future  -     TSH; Future    7. Impaired fasting glucose  Comments:  HA1c 5.9 and stable.   Orders:  -     Hemoglobin A1c; Future    8. Vitamin D deficiency  Comments:  Stable on multivitamin.  Orders:  -     Vitamin D 25 Hydroxy; Future    9. Bilateral impacted cerumen  -     Cerumen Removal        Follow Up:   No follow-ups on file.     An After Visit Summary and PPPS were made available to the patient.

## 2022-10-12 ENCOUNTER — OFFICE VISIT (OUTPATIENT)
Dept: INTERNAL MEDICINE | Facility: CLINIC | Age: 87
End: 2022-10-12

## 2022-10-12 VITALS
WEIGHT: 163.4 LBS | OXYGEN SATURATION: 98 % | SYSTOLIC BLOOD PRESSURE: 100 MMHG | HEIGHT: 70 IN | DIASTOLIC BLOOD PRESSURE: 60 MMHG | BODY MASS INDEX: 23.39 KG/M2 | TEMPERATURE: 97.3 F | HEART RATE: 64 BPM

## 2022-10-12 DIAGNOSIS — H61.21 IMPACTED CERUMEN OF RIGHT EAR: ICD-10-CM

## 2022-10-12 DIAGNOSIS — M54.6 THORACIC BACK PAIN, UNSPECIFIED BACK PAIN LATERALITY, UNSPECIFIED CHRONICITY: Primary | ICD-10-CM

## 2022-10-12 PROCEDURE — 99213 OFFICE O/P EST LOW 20 MIN: CPT | Performed by: NURSE PRACTITIONER

## 2022-10-16 PROCEDURE — 69210 REMOVE IMPACTED EAR WAX UNI: CPT | Performed by: NURSE PRACTITIONER

## 2022-10-17 NOTE — PROGRESS NOTES
Chief Complaint  Follow-up (Ear wax and back pain./)  Subjective        History of Present Illness  Payam Owens is a 87 y.o. male who presents to [facility] for 1 week  follow-up of clogged ear and back pain. He is having difficulty hearing out of his right ear and was found to have wax impaction at his last visit. /right mid back and rib pain has improved with stretches and heat/cold therapy.       Past Medical History:   Diagnosis Date   • Arthritis    • Back problem     Back Decompression 1990   • Breathing problem     patient had breathing problem 2 years ago   • Chronic fatigue    • Edema    • Enlarged prostate    • Essential hypertension 06/08/2020   • History of echocardiogram 05/29/2020    EF = 66.0%, borderline concentric hypertrophy, diastolic dysfunction (grade I), moderate calcification of the aortic valve, moderate calcification of the aortic valve.Trace-to-mild aortic valve regurgitation, mild to moderate aortic stenosis, Mild MR/TR/CT   • Hyperlipidemia, unspecified    • Hypothyroidism, unspecified    • Iron deficiency anemia, unspecified    • Lung nodule    • Neck injury 2021   • Prostate disorder     tuna of prostate Dr. Torre [id number]   • Unilateral primary osteoarthritis, left knee    • Vitamin D deficiency, unspecified         Past Surgical History:   Procedure Laterality Date   • BACK SURGERY      back fusion 1992   • CARDIAC CATHETERIZATION N/A 7/21/2020    Procedure: Right and Left Heart Cath;  Surgeon: Collin Harvey MD;  [facility];  Service: Cardiology;  Laterality: N/A;   • CARDIAC CATHETERIZATION N/A 7/21/2020    Procedure: Left ventriculography;  Surgeon: Collin Harvey MD;  Location[facility] LOC[facility];  Service: Cardiology;  Laterality: N/A;   • CARDIAC CATHETERIZATION N/A 7/21/2020    Procedure: Coronary angiography;  Surgeon: Collin Harvey MD;  Location: [facility] C[facility] LOC[facility];  Service:  "Cardiology;  Laterality: N/A;   • COLONOSCOPY  02/2018   • PROSTATE SURGERY          No Known Allergies       Current Outpatient Medications:   •  cyanocobalamin (VITAMIN B-12) 1000 MCG tablet, Take 1 tablet by mouth Daily., Disp: , Rfl:   •  GLUCOSA-CHONDR-NA CHONDR-MSM PO, Take 1 capsule by mouth Daily., Disp: , Rfl:   •  levothyroxine (SYNTHROID, LEVOTHROID) 25 MCG tablet, Take 1 tablet by mouth each morning on empty stomach with water only and wait 30 to 60 minutes before consuming any other liquids, food, or medications., Disp: 90 tablet, Rfl: 3  •  lisinopril-hydrochlorothiazide (PRINZIDE,ZESTORETIC) 10-12.5 MG per tablet, Take 1 tablet by mouth Daily., Disp: 90 tablet, Rfl: 3  •  meloxicam (MOBIC) 7.5 MG tablet, Take 1 tablet by mouth Daily., Disp: 90 tablet, Rfl: 3  •  multivitamin with minerals tablet tablet, Take 1 tablet by mouth Daily., Disp: , Rfl:   •  Zinc 25 MG tablet, Take  by mouth., Disp: , Rfl:     Objective   /60 (BP Location: Left arm, Patient Position: Sitting, Cuff Size: Large Adult)   Pulse 64   Temp 97.3 °F (36.3 °C) (Temporal)   Ht 177.8 cm (70\")   Wt 74.1 kg (163 lb 6.4 oz)   SpO2 98%   BMI 23.45 kg/m²    Estimated body mass index is 23.45 kg/m² as calculated from the following:    Height as of this encounter: 177.8 cm (70\").    Weight as of this encounter: 74.1 kg (163 lb 6.4 oz).   Physical Exam  Vitals reviewed.   Constitutional:       General: He is not in acute distress.     Appearance: Normal appearance.   HENT:      Head: Normocephalic and atraumatic.      Right Ear: There is impacted cerumen.      Left Ear: Tympanic membrane and ear canal normal.   Pulmonary:      Effort: Pulmonary effort is normal.   Neurological:      General: No focal deficit present.      Mental Status: He is alert.   Psychiatric:         Thought Content: Thought content normal.        Result Review :   The following data was reviewed by: CHAPO Lafleur on 10/12/2022: x-ray    Ear Cerumen " Removal    Date/Time: 10/16/2022 9:10 PM  Performed by: Jamila Loco APRN  Authorized by: Jamila Loco APRN   Location details: right ear  Patient tolerance: patient tolerated the procedure well with no immediate complications  Procedure type: instrumentation, irrigation             Assessment and Plan    Diagnoses and all orders for this visit:    1. Thoracic back pain, unspecified back pain laterality, unspecified chronicity (Primary)  Comments:  Improving.    2. Impacted cerumen of right ear  Comments:  Ear irrigation done;  TM and canal clear post irrigation.  Orders:  -     Cerumen Removal      Follow Up     Patient was given instructions and counseling regarding his condition or for health maintenance advice. Please see specific information pulled into the AVS if appropriate.   Return if symptoms worsen or fail to improve.    CHAPO Lafleur

## 2023-04-14 ENCOUNTER — TELEPHONE (OUTPATIENT)
Dept: INTERNAL MEDICINE | Facility: CLINIC | Age: 88
End: 2023-04-14
Payer: MEDICARE

## 2023-04-14 ENCOUNTER — LAB (OUTPATIENT)
Dept: INTERNAL MEDICINE | Facility: CLINIC | Age: 88
End: 2023-04-14
Payer: MEDICARE

## 2023-04-14 DIAGNOSIS — E55.9 VITAMIN D DEFICIENCY: Chronic | ICD-10-CM

## 2023-04-14 DIAGNOSIS — E03.9 HYPOTHYROIDISM, UNSPECIFIED TYPE: ICD-10-CM

## 2023-04-14 DIAGNOSIS — R73.01 IMPAIRED FASTING GLUCOSE: ICD-10-CM

## 2023-04-14 DIAGNOSIS — I10 ESSENTIAL HYPERTENSION: ICD-10-CM

## 2023-04-17 ENCOUNTER — OFFICE VISIT (OUTPATIENT)
Dept: INTERNAL MEDICINE | Facility: CLINIC | Age: 88
End: 2023-04-17
Payer: MEDICARE

## 2023-04-17 VITALS
HEIGHT: 70 IN | SYSTOLIC BLOOD PRESSURE: 142 MMHG | OXYGEN SATURATION: 99 % | TEMPERATURE: 97.4 F | WEIGHT: 162 LBS | DIASTOLIC BLOOD PRESSURE: 60 MMHG | BODY MASS INDEX: 23.19 KG/M2 | HEART RATE: 55 BPM

## 2023-04-17 DIAGNOSIS — G89.29 CHRONIC THORACIC BACK PAIN, UNSPECIFIED BACK PAIN LATERALITY: ICD-10-CM

## 2023-04-17 DIAGNOSIS — I10 ESSENTIAL HYPERTENSION: Primary | Chronic | ICD-10-CM

## 2023-04-17 DIAGNOSIS — E03.9 HYPOTHYROIDISM, UNSPECIFIED TYPE: Chronic | ICD-10-CM

## 2023-04-17 DIAGNOSIS — R73.01 IMPAIRED FASTING GLUCOSE: ICD-10-CM

## 2023-04-17 DIAGNOSIS — M17.12 OSTEOARTHRITIS OF LEFT KNEE, UNSPECIFIED OSTEOARTHRITIS TYPE: Chronic | ICD-10-CM

## 2023-04-17 DIAGNOSIS — I35.1 MILD AORTIC VALVE REGURGITATION: ICD-10-CM

## 2023-04-17 DIAGNOSIS — E78.5 HYPERLIPIDEMIA, UNSPECIFIED HYPERLIPIDEMIA TYPE: ICD-10-CM

## 2023-04-17 DIAGNOSIS — E55.9 VITAMIN D DEFICIENCY: Chronic | ICD-10-CM

## 2023-04-17 DIAGNOSIS — M54.6 CHRONIC THORACIC BACK PAIN, UNSPECIFIED BACK PAIN LATERALITY: ICD-10-CM

## 2023-04-17 LAB
25(OH)D3 SERPL-MCNC: 37.8 NG/ML (ref 30–100)
ALBUMIN SERPL-MCNC: 4 G/DL (ref 3.5–5.2)
ALBUMIN/GLOB SERPL: 1.5 G/DL
ALP SERPL-CCNC: 113 U/L (ref 39–117)
ALT SERPL W P-5'-P-CCNC: 13 U/L (ref 1–41)
ANION GAP SERPL CALCULATED.3IONS-SCNC: 8 MMOL/L (ref 5–15)
AST SERPL-CCNC: 26 U/L (ref 1–40)
BASOPHILS # BLD AUTO: 0.03 10*3/MM3 (ref 0–0.2)
BASOPHILS NFR BLD AUTO: 0.6 % (ref 0–1.5)
BILIRUB SERPL-MCNC: 0.3 MG/DL (ref 0–1.2)
BUN SERPL-MCNC: 28 MG/DL (ref 8–23)
BUN/CREAT SERPL: 24.8 (ref 7–25)
CALCIUM SPEC-SCNC: 9.2 MG/DL (ref 8.6–10.5)
CHLORIDE SERPL-SCNC: 103 MMOL/L (ref 98–107)
CO2 SERPL-SCNC: 27 MMOL/L (ref 22–29)
CREAT SERPL-MCNC: 1.13 MG/DL (ref 0.76–1.27)
DEPRECATED RDW RBC AUTO: 38.8 FL (ref 37–54)
EGFRCR SERPLBLD CKD-EPI 2021: 63.3 ML/MIN/1.73
EOSINOPHIL # BLD AUTO: 0.21 10*3/MM3 (ref 0–0.4)
EOSINOPHIL NFR BLD AUTO: 4.4 % (ref 0.3–6.2)
ERYTHROCYTE [DISTWIDTH] IN BLOOD BY AUTOMATED COUNT: 11.9 % (ref 12.3–15.4)
GLOBULIN UR ELPH-MCNC: 2.7 GM/DL
GLUCOSE SERPL-MCNC: 104 MG/DL (ref 65–99)
HBA1C MFR BLD: 5.7 % (ref 4.8–5.6)
HCT VFR BLD AUTO: 37.9 % (ref 37.5–51)
HGB BLD-MCNC: 13.2 G/DL (ref 13–17.7)
IMM GRANULOCYTES # BLD AUTO: 0.01 10*3/MM3 (ref 0–0.05)
IMM GRANULOCYTES NFR BLD AUTO: 0.2 % (ref 0–0.5)
LYMPHOCYTES # BLD AUTO: 1.59 10*3/MM3 (ref 0.7–3.1)
LYMPHOCYTES NFR BLD AUTO: 33 % (ref 19.6–45.3)
MCH RBC QN AUTO: 31.4 PG (ref 26.6–33)
MCHC RBC AUTO-ENTMCNC: 34.8 G/DL (ref 31.5–35.7)
MCV RBC AUTO: 90 FL (ref 79–97)
MONOCYTES # BLD AUTO: 0.36 10*3/MM3 (ref 0.1–0.9)
MONOCYTES NFR BLD AUTO: 7.5 % (ref 5–12)
NEUTROPHILS NFR BLD AUTO: 2.62 10*3/MM3 (ref 1.7–7)
NEUTROPHILS NFR BLD AUTO: 54.3 % (ref 42.7–76)
NRBC BLD AUTO-RTO: 0 /100 WBC (ref 0–0.2)
PLATELET # BLD AUTO: 217 10*3/MM3 (ref 140–450)
PMV BLD AUTO: 10.7 FL (ref 6–12)
POTASSIUM SERPL-SCNC: 4.9 MMOL/L (ref 3.5–5.2)
PROT SERPL-MCNC: 6.7 G/DL (ref 6–8.5)
RBC # BLD AUTO: 4.21 10*6/MM3 (ref 4.14–5.8)
SODIUM SERPL-SCNC: 138 MMOL/L (ref 136–145)
T4 FREE SERPL-MCNC: 1.19 NG/DL (ref 0.93–1.7)
TSH SERPL DL<=0.05 MIU/L-ACNC: 3.97 UIU/ML (ref 0.27–4.2)
WBC NRBC COR # BLD: 4.82 10*3/MM3 (ref 3.4–10.8)

## 2023-04-17 PROCEDURE — 36415 COLL VENOUS BLD VENIPUNCTURE: CPT | Performed by: NURSE PRACTITIONER

## 2023-04-17 PROCEDURE — 84439 ASSAY OF FREE THYROXINE: CPT | Performed by: NURSE PRACTITIONER

## 2023-04-17 PROCEDURE — 1159F MED LIST DOCD IN RCRD: CPT | Performed by: NURSE PRACTITIONER

## 2023-04-17 PROCEDURE — 1160F RVW MEDS BY RX/DR IN RCRD: CPT | Performed by: NURSE PRACTITIONER

## 2023-04-17 PROCEDURE — 83036 HEMOGLOBIN GLYCOSYLATED A1C: CPT | Performed by: NURSE PRACTITIONER

## 2023-04-17 PROCEDURE — 84443 ASSAY THYROID STIM HORMONE: CPT | Performed by: NURSE PRACTITIONER

## 2023-04-17 PROCEDURE — 82306 VITAMIN D 25 HYDROXY: CPT | Performed by: NURSE PRACTITIONER

## 2023-04-17 PROCEDURE — 80053 COMPREHEN METABOLIC PANEL: CPT | Performed by: NURSE PRACTITIONER

## 2023-04-17 PROCEDURE — 85025 COMPLETE CBC W/AUTO DIFF WBC: CPT | Performed by: NURSE PRACTITIONER

## 2023-04-17 NOTE — PROGRESS NOTES
Chief Complaint  Follow-up (6 month follow up. The patient states he is still having trouble with the middle part of his back, may be due to previous injury. He was doing therapy, he says this help a little, but he says its still very nagging. )  Subjective    History of Present Illness  Payam Owens is a 88 y.o. male  presents to [facility] for follow-up hypertension, hyperlipidemia, hypothyroidism, osteoarthritis of the left knee, impaired fasting glucose and vitamin D deficiency. The patient is not having any medication side effects.  Negative for chest pain, dizziness, headaches and fatigue. He reports that the shortness of breath has been better.  He is complaining of chronic mid back pain that occurs after using the weedeater or other activities.  This pain is described as nagging and improves with rest. 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 The patient is very active and runs/walks 20+ miles a week.  Patient reports that his cardiologist has switched to [facility] and is no longer in his insurance network.  He is requesting a local cardiologist.  Labs today.     Specialist: Dr. Fernandez and Dr. Molina.  Cardiac cath was done 2020.  Colonoscopy 2/2018 polyps removed per Dr. De Jesus.      Past Medical History:   Diagnosis Date   • Arthritis    • Back problem     Back Decompression 1990   • Breathing problem     patient had breathing problem 2 years ago   • Chronic fatigue    • Edema    • Enlarged prostate    • Essential hypertension 06/08/2020   • History of echocardiogram 05/29/2020    EF = 66.0%, borderline concentric hypertrophy, diastolic dysfunction (grade I), moderate calcification of the aortic valve, moderate calcification of the aortic valve.Trace-to-mild aortic valve regurgitation, mild to moderate aortic stenosis, Mild MR/TR/OR   • Hyperlipidemia, unspecified     • Hypothyroidism, unspecified    • Iron deficiency anemia, unspecified    • Lung nodule    • Neck injury 2021   • Prostate disorder     tuna of prostate Dr. Torre [phone]   • Unilateral primary osteoarthritis, left knee    • Vitamin D deficiency, unspecified         Past Surgical History:   Procedure Laterality Date   • BACK SURGERY      back fusion 1992   • CARDIAC CATHETERIZATION N/A 7/21/2020    Procedure: Right and Left Heart Cath;  Surgeon: Collin Harvey MD;  Location:  [facility];  Service: Cardiology;  Laterality: N/A;   • CARDIAC CATHETERIZATION N/A 7/21/2020    Procedure: Left ventriculography;  Surgeon: Collin Harvey MD;  Location:  [facility];  Service: Cardiology;  Laterality: N/A;   • CARDIAC CATHETERIZATION N/A 7/21/2020    Procedure: Coronary angiography;  Surgeon: Collin Harvey MD;  Location:  [facility];  Service: Cardiology;  Laterality: N/A;   • COLONOSCOPY  02/2018   • PROSTATE SURGERY          No Known Allergies       Current Outpatient Medications:   •  cyanocobalamin (VITAMIN B-12) 1000 MCG tablet, Take 1 tablet by mouth Daily., Disp: , Rfl:   •  GLUCOSA-CHONDR-NA CHONDR-MSM PO, Take 1 capsule by mouth Daily., Disp: , Rfl:   •  levothyroxine (SYNTHROID, LEVOTHROID) 25 MCG tablet, Take 1 tablet by mouth each morning on empty stomach with water only and wait 30 to 60 minutes before consuming any other liquids, food, or medications., Disp: 90 tablet, Rfl: 3  •  lisinopril-hydrochlorothiazide (PRINZIDE,ZESTORETIC) 10-12.5 MG per tablet, Take 1 tablet by mouth Daily., Disp: 90 tablet, Rfl: 3  •  meloxicam (MOBIC) 7.5 MG tablet, Take 1 tablet by mouth Daily., Disp: 90 tablet, Rfl: 3  •  multivitamin with minerals tablet tablet, Take 1 tablet by mouth Daily., Disp: , Rfl:   •  Zinc 25 MG tablet, Take  by mouth., Disp: , Rfl:     Objective   /60 (BP Location: Right arm, Patient Position: Sitting, Cuff Size: Adult)   Pulse 55    "Temp 97.4 °F (36.3 °C) (Temporal)   Ht 177.8 cm (70\")   Wt 73.5 kg (162 lb)   SpO2 99%   BMI 23.24 kg/m²    Estimated body mass index is 23.24 kg/m² as calculated from the following:    Height as of this encounter: 177.8 cm (70\").    Weight as of this encounter: 73.5 kg (162 lb).   Physical Exam  Vitals reviewed.   Constitutional:       General: He is not in acute distress.  HENT:      Head: Normocephalic and atraumatic.   Neck:      Comments: No thyroid enlargement  Cardiovascular:      Rate and Rhythm: Normal rate and regular rhythm.   Pulmonary:      Effort: Pulmonary effort is normal.      Breath sounds: Normal breath sounds. No wheezing, rhonchi or rales.   Musculoskeletal:      Thoracic back: No tenderness or bony tenderness.      Right lower le+ Edema present.      Left lower le+ Edema present.   Lymphadenopathy:      Cervical: No cervical adenopathy.   Skin:     General: Skin is warm and dry.   Neurological:      General: No focal deficit present.      Mental Status: He is alert.   Psychiatric:         Thought Content: Thought content normal.             Assessment and Plan   Diagnoses and all orders for this visit:    1. Essential hypertension (Primary)  Comments:  BP well-controlled on lisinopril-HCTZ 10-12.5 mg daily and to continue.   Orders:  -     Comprehensive metabolic panel  -     CBC w AUTO Differential  -     Comprehensive Metabolic Panel; Future  -     CBC & Differential; Future    2. Mild aortic valve regurgitation  -     Ambulatory Referral to Cardiology    3. Chronic thoracic back pain, unspecified back pain laterality  Comments:  Differentials discussed such as spasm, arthritis, inflammation, etc.  Recommend treat with rest, heat and stretching.  RTC if worsening.    4. Osteoarthritis of left knee, unspecified osteoarthritis type  Comments:  Improved.  Stable on meloxicam and to continue.  We will check renal function today.    5. Hypothyroidism, unspecified " type  Comments:  Stable on levothyroxine 25 mcg daily.  Check levels today.  Orders:  -     TSH+Free T4  -     T4, Free; Future  -     TSH; Future    6. Vitamin D deficiency  Comments:  Taking multivitamin.  Check level today.  Orders:  -     Vitamin D 25 hydroxy  -     Vitamin D,25-Hydroxy; Future    7. Impaired fasting glucose  Comments:  Check HA1C today.  Orders:  -     Hemoglobin A1c  -     Hemoglobin A1c; Future    8. Hyperlipidemia, unspecified hyperlipidemia type  Comments:  Check fasting labs in 6 months.  Orders:  -     Lipid Panel; Future         Patient was given instructions and counseling regarding his condition or for health maintenance advice. Please see specific information pulled into the AVS if appropriate.     Follow Up   Return in about 6 months (around 10/17/2023) for Medicare Wellness.    CHAPO Lafleur

## 2023-05-01 RX ORDER — LISINOPRIL AND HYDROCHLOROTHIAZIDE 25; 20 MG/1; MG/1
TABLET ORAL
Qty: 90 TABLET | Refills: 3 | OUTPATIENT
Start: 2023-05-01

## 2023-05-02 ENCOUNTER — TELEPHONE (OUTPATIENT)
Dept: INTERNAL MEDICINE | Facility: CLINIC | Age: 88
End: 2023-05-02
Payer: MEDICARE

## 2023-05-02 DIAGNOSIS — I10 ESSENTIAL HYPERTENSION: Chronic | ICD-10-CM

## 2023-05-02 RX ORDER — LISINOPRIL AND HYDROCHLOROTHIAZIDE 25; 20 MG/1; MG/1
1 TABLET ORAL DAILY
COMMUNITY
End: 2023-05-02 | Stop reason: SDUPTHER

## 2023-05-02 RX ORDER — LISINOPRIL AND HYDROCHLOROTHIAZIDE 25; 20 MG/1; MG/1
1 TABLET ORAL DAILY
Qty: 90 TABLET | Refills: 1 | Status: SHIPPED | OUTPATIENT
Start: 2023-05-02

## 2023-05-02 NOTE — TELEPHONE ENCOUNTER
Pharmacy called on the patient and asked for a refill on his Lisinopril/HCTZ 20/25 mgs qd. Chart states that he is on 10/12.5 mgs qd. His last refill was the 20/25 mgs qd. Clarified directions with the patient and updated his med list to reflect the new dosage.

## 2023-05-30 RX ORDER — MELOXICAM 7.5 MG/1
TABLET ORAL
Qty: 90 TABLET | Refills: 3 | Status: SHIPPED | OUTPATIENT
Start: 2023-05-30

## 2023-06-19 DIAGNOSIS — E03.9 HYPOTHYROIDISM, UNSPECIFIED TYPE: ICD-10-CM

## 2023-06-19 RX ORDER — LEVOTHYROXINE SODIUM 0.03 MG/1
TABLET ORAL
Qty: 90 TABLET | Refills: 3 | Status: SHIPPED | OUTPATIENT
Start: 2023-06-19

## 2023-07-24 ENCOUNTER — OFFICE VISIT (OUTPATIENT)
Dept: ORTHOPEDIC SURGERY | Facility: CLINIC | Age: 88
End: 2023-07-24
Payer: MEDICARE

## 2023-07-24 VITALS
BODY MASS INDEX: 22.48 KG/M2 | OXYGEN SATURATION: 98 % | HEART RATE: 66 BPM | DIASTOLIC BLOOD PRESSURE: 67 MMHG | WEIGHT: 157 LBS | HEIGHT: 70 IN | SYSTOLIC BLOOD PRESSURE: 109 MMHG

## 2023-07-24 DIAGNOSIS — M17.12 PRIMARY OSTEOARTHRITIS OF LEFT KNEE: Primary | ICD-10-CM

## 2023-07-24 NOTE — PROGRESS NOTES
"Chief Complaint  Initial Evaluation of the Left Knee     Subjective      Payam Owens presents to [facility] for initial evaluation of the left knee. He has treated his knee conservatively with injections in the past.  He has difficulty with prolonged standing, ambulation and stairs.  He has had no injury or fall.     No Known Allergies     Social History     Socioeconomic History    Marital status:    Tobacco Use    Smoking status: Never    Smokeless tobacco: Never   Vaping Use    Vaping Use: Never used   Substance and Sexual Activity    Alcohol use: Yes    Drug use: Never    Sexual activity: Defer        Review of Systems     Objective   Vital Signs:   /67 (BP Location: Right arm, Patient Position: Sitting, Cuff Size: Adult)   Pulse 66   Ht 177.8 cm (70\")   Wt 71.2 kg (157 lb)   SpO2 98%   BMI 22.53 kg/m²       Physical Exam  Constitutional:       Appearance: Normal appearance. Patient is well-developed and normal weight.   HENT:      Head: Normocephalic.      Right Ear: Hearing and external ear normal.      Left Ear: Hearing and external ear normal.      Nose: Nose normal.   Eyes:      Conjunctiva/sclera: Conjunctivae normal.   Cardiovascular:      Rate and Rhythm: Normal rate.   Pulmonary:      Effort: Pulmonary effort is normal.      Breath sounds: No wheezing or rales.   Abdominal:      Palpations: Abdomen is soft.      Tenderness: There is no abdominal tenderness.   Musculoskeletal:      Cervical back: Normal range of motion.   Skin:     Findings: No rash.   Neurological:      Mental Status: Patient  is alert and oriented to person, place, and time.   Psychiatric:         Mood and Affect: Mood and affect normal.         Judgment: Judgment normal.       Ortho Exam      LEFT KNEE Flexion 120. Extension 0. Stable to varus/valgus stress. Stable to anterior/posterior drawer. Neurovascularly intact. Negative Marcello. Negative Lachman. Positive EHL, FHL, HS and TA. " Sensation intact to light touch all 5 nerves of the foot. Ambulates with Antalgic gait. Patella is well tracking. Calf supple, non-tender. Positive tenderness to the medial joint line. Positive tenderness to the lateral joint line. Positive Crepitus. Good strength to hamstrings, quadriceps, dorsiflexors, and plantar flexors.  Knee Extensor Mechanism intact        Large Joint Arthrocentesis: L knee  Date/Time: 7/24/2023 10:26 AM  Consent given by: patient  Site marked: site marked  Timeout: Immediately prior to procedure a time out was called to verify the correct patient, procedure, equipment, support staff and site/side marked as required   Supporting Documentation  Indications: pain   Procedure Details  Location: knee - L knee  Needle size: 22 G  Medications administered: 48 mg hylan 48 MG/6ML  Patient tolerance: patient tolerated the procedure well with no immediate complications          Imaging Results (Most Recent)       Procedure Component Value Units Date/Time    XR Knee 3 View Left [[id number]] Resulted: 07/24/23 1009     Updated: 07/24/23 1009             Result Review :     X-Ray Report:  Left knee X-Ray  Indication: Evaluation of the left knee  AP/Lateral and [place] view(s)  Findings: Advanced degenerative arthritis.   Prior studies available for comparison: yes             Assessment and Plan     Diagnoses and all orders for this visit:    1. Primary osteoarthritis of left knee (Primary)  -     XR Knee 3 View Left        Discussed the treatment plan with the patient. I reviewed the X-rays that were obtained today with the patient.     Discussed the risks and benefits of conservative measures.  The patient expressed understanding and wished to proceed with a left knee Synvisc injection.  He tolerated the injection well.         Call or return if worsening symptoms.    Follow Up     PRN      Patient was given instructions and counseling regarding his condition or for health maintenance advice. Please see  specific information pulled into the AVS if appropriate.     Scribed for Russell Fernandez MD by Lizbeth Alford MA.  07/24/23   10:12 EDT    I have personally performed the services described in this document as scribed by the above individual and it is both accurate and complete. Russell Fernandez MD 07/24/23

## 2023-08-02 ENCOUNTER — OFFICE VISIT (OUTPATIENT)
Dept: NEUROSURGERY | Facility: CLINIC | Age: 88
End: 2023-08-02
Payer: MEDICARE

## 2023-08-02 VITALS
DIASTOLIC BLOOD PRESSURE: 61 MMHG | HEART RATE: 52 BPM | SYSTOLIC BLOOD PRESSURE: 107 MMHG | HEIGHT: 70 IN | BODY MASS INDEX: 22.5 KG/M2 | WEIGHT: 157.2 LBS

## 2023-08-02 DIAGNOSIS — M47.812 CERVICAL SPONDYLOSIS WITHOUT MYELOPATHY: Primary | ICD-10-CM

## 2023-08-02 RX ORDER — GABAPENTIN 100 MG/1
100 CAPSULE ORAL 3 TIMES DAILY
Qty: 90 CAPSULE | Refills: 1 | Status: SHIPPED | OUTPATIENT
Start: 2023-08-02

## 2023-08-21 ENCOUNTER — TELEPHONE (OUTPATIENT)
Dept: CARDIOLOGY | Facility: CLINIC | Age: 88
End: 2023-08-21

## 2023-08-21 NOTE — TELEPHONE ENCOUNTER
Incoming call from Jamila Loco's office  and they were wondering if you can take over prescribing the lisinopril for Mr Germania Owens ? If so they would like for you to review his dosage to make sure you don't want him taking a different dosage and send in his prescription.     Thank you

## 2023-08-21 NOTE — TELEPHONE ENCOUNTER
Yes I can, I agree with his current dose unless he is having too low of blood pressures with symptoms

## 2023-09-13 ENCOUNTER — HOSPITAL ENCOUNTER (OUTPATIENT)
Dept: MRI IMAGING | Facility: HOSPITAL | Age: 88
Discharge: HOME | End: 2023-09-13
Admitting: NURSE PRACTITIONER
Payer: MEDICARE

## 2023-09-13 DIAGNOSIS — M47.812 CERVICAL SPONDYLOSIS WITHOUT MYELOPATHY: ICD-10-CM

## 2023-09-13 PROCEDURE — 72141 MRI NECK SPINE W/O DYE: CPT

## 2023-09-14 ENCOUNTER — OFFICE VISIT (OUTPATIENT)
Dept: NEUROSURGERY | Facility: CLINIC | Age: 88
End: 2023-09-14
Payer: MEDICARE

## 2023-09-14 VITALS
DIASTOLIC BLOOD PRESSURE: 79 MMHG | HEIGHT: 70 IN | BODY MASS INDEX: 22.89 KG/M2 | HEART RATE: 59 BPM | WEIGHT: 159.9 LBS | SYSTOLIC BLOOD PRESSURE: 152 MMHG

## 2023-09-14 DIAGNOSIS — M47.812 CERVICAL SPONDYLOSIS WITHOUT MYELOPATHY: Primary | ICD-10-CM

## 2023-09-14 RX ORDER — GABAPENTIN 100 MG/1
100 CAPSULE ORAL 3 TIMES DAILY
Qty: 90 CAPSULE | Refills: 2 | Status: SHIPPED | OUTPATIENT
Start: 2023-09-14

## 2023-09-14 NOTE — PROGRESS NOTES
"Chief Complaint  Follow-up (Discuss MRI, patient states that gabapentin has helped a lot)    Subjective          Payam Owens who is a 88 y.o. year old male who presents to [facility] for follow up of neck pain.     History of Present Illness  MRI Cervical Spine on 9/13/23 at [facility] personally reviewed. Multilevel spondylosis, resulting in multilevel spinal canal and foraminal stenosis. At C6/7 there is moderate spinal stenosis. This is the most notable finding.       Pt has appreciated significant relief since starting the low dose of Gabapentin. He will typically take this once a day. May take two if needed. His pain is well managed with this.         Interval History 8/2/23     Payam Owens who is a 88 y.o. year old male who presents to [facility] for evaluation of neck pain.      History of Present Illness  Pt presenting for concerns of neck pain. He has had this for several years. His having pain in the posterior cervical spine, radiating into the shoulders. Pain increases with prolonged standing, twisting, and lifting. He denies pain or numbness into the arms.      He enjoys weed eating and mowing. He stays quite physically active.      He went to physical therapy a year ago, which provided some benefit. He has continued these exercises at home. This provides some relief though pain persists.      When he was last seen in our office, we had started a low dose of Gabapentin which provided benefit. He is no longer taking this.     Recent Interventions: Gabapentin      Review of Systems   Musculoskeletal:  Positive for myalgias, neck pain and neck stiffness.   All other systems reviewed and are negative.     Objective   Vital Signs:   /79 (BP Location: Left arm, Patient Position: Sitting, Cuff Size: Adult)   Pulse 59   Ht 177.8 cm (70\")   Wt 72.5 kg (159 lb 14.4 oz)   BMI 22.94 kg/m²       Physical Exam  Vitals reviewed. "   Constitutional:       Appearance: Normal appearance.   Musculoskeletal:      Right shoulder: No tenderness. Normal range of motion.      Left shoulder: No tenderness. Normal range of motion.      Cervical back: Tenderness present. Pain with movement present. Decreased range of motion.   Neurological:      Mental Status: He is alert and oriented to person, place, and time.      Motor: Motor strength is normal.      Gait: Gait is intact.      Deep Tendon Reflexes:      Reflex Scores:       Tricep reflexes are 2+ on the right side and 2+ on the left side.       Bicep reflexes are 2+ on the right side and 2+ on the left side.       Brachioradialis reflexes are 2+ on the right side and 2+ on the left side.     Neurologic Exam     Mental Status   Oriented to person, place, and time.   Level of consciousness: alert    Motor Exam   Muscle bulk: normal  Overall muscle tone: normal    Strength   Strength 5/5 throughout.     Sensory Exam   Light touch normal.     Gait, Coordination, and Reflexes     Gait  Gait: normal    Reflexes   Right brachioradialis: 2+  Left brachioradialis: 2+  Right biceps: 2+  Left biceps: 2+  Right triceps: 2+  Left triceps: 2+  Right Zuñiga: absent  Left Zuñiga: absent     Result Review :       Data reviewed : Radiologic studies MRI Cervical Spine on 9/13/23 at [facility] personally reviewed. Multilevel spondylosis, resulting in multilevel spinal canal and foraminal stenosis. At C6/7 there is moderate spinal stenosis. This is the most notable finding.            Assessment and Plan    Diagnoses and all orders for this visit:    1. Cervical spondylosis without myelopathy (Primary)  -     gabapentin (NEURONTIN) 100 MG capsule; Take 1 capsule by mouth 3 (Three) Times a Day.  Dispense: 90 capsule; Refill: 2    We reviewed his MRI Cervical Spine. His pain has improved with Gabapentin. We will continue this. Could consider LASHONDA if symptoms. Plan to follow up in three months to re-evaluate, or sooner if need  arises.       Follow Up   Return in about 3 months (around 12/14/2023).  Patient was given instructions and counseling regarding his condition or for health maintenance advice.       -Continue Gabapentin 100 mg  -Follow up in 3 months

## 2023-10-11 DIAGNOSIS — I10 ESSENTIAL HYPERTENSION: Chronic | ICD-10-CM

## 2023-10-11 RX ORDER — LISINOPRIL AND HYDROCHLOROTHIAZIDE 12.5; 1 MG/1; MG/1
1 TABLET ORAL DAILY
Qty: 90 TABLET | Refills: 3 | OUTPATIENT
Start: 2023-10-11

## 2023-10-16 NOTE — PROGRESS NOTES
The ABCs of the Annual Wellness Visit  Subsequent Medicare Wellness Visit    Subjective    Payam Owens is a 88 y.o. male who presents for a Subsequent Medicare Wellness Visit.    The following portions of the patient's history were reviewed and   updated as appropriate: He  has a past medical history of Arthritis, Back problem, Breathing problem, Chronic fatigue, Edema, Enlarged prostate, Essential hypertension (06/08/2020), History of echocardiogram (05/29/2020), HL (hearing loss) (2000), Hyperlipidemia, unspecified, Hypothyroidism, unspecified, Iron deficiency anemia, unspecified, Lung nodule, Neck injury (2021), Prostate disorder, Unilateral primary osteoarthritis, left knee, and Vitamin D deficiency, unspecified.  He has Essential hypertension; Bilateral lower extremity edema; Hyperlipidemia; Hypothyroidism; Iron deficiency anemia; Primary osteoarthritis of left knee; and Vitamin D deficiency on their pertinent problem list.  He  has a past surgical history that includes Back surgery; Prostate surgery; Cardiac catheterization (N/A, 07/21/2020); Cardiac catheterization (N/A, 07/21/2020); Cardiac catheterization (N/A, 07/21/2020); Colonoscopy (02/2018); and Eye surgery (2000).  His family history includes Breast cancer in his mother; COPD in his brother; Dementia in his father; Heart disease in his mother; Heart failure in his mother; Other in his brother, father, and mother.  He  reports that he has never smoked. He has never used smokeless tobacco. He reports that he does not currently use alcohol. He reports that he does not use drugs.  Current Outpatient Medications   Medication Sig Dispense Refill    cyanocobalamin (VITAMIN B-12) 1000 MCG tablet Take 1 tablet by mouth Daily.      gabapentin (NEURONTIN) 100 MG capsule Take 1 capsule by mouth 3 (Three) Times a Day. 90 capsule 2    GLUCOSA-CHONDR-NA CHONDR-MSM PO Take 1 capsule by mouth Daily.      levothyroxine (SYNTHROID, LEVOTHROID) 25 MCG tablet TAKE ONE  TABLET BY MOUTH EVERY MORNING ON AN EMPTY STOMACH WITH WATER ONLY AND WAIT 30-60 MINUTES BEFORE CONSUMING ANY OTHER LIQUIDS, FOOD OR MEDICATIONS 90 tablet 3    lisinopril-hydrochlorothiazide (PRINZIDE,ZESTORETIC) 20-25 MG per tablet Take 1 tablet by mouth Daily. 90 tablet 1    meloxicam (MOBIC) 7.5 MG tablet TAKE ONE TABLET BY MOUTH ONCE A DAY 90 tablet 3    multivitamin with minerals tablet tablet Take 1 tablet by mouth Daily.      Omega-3 Fatty Acids (fish oil) 1000 MG capsule capsule Take  by mouth Daily With Breakfast.       No current facility-administered medications for this visit.     Current Outpatient Medications on File Prior to Visit   Medication Sig    cyanocobalamin (VITAMIN B-12) 1000 MCG tablet Take 1 tablet by mouth Daily.    gabapentin (NEURONTIN) 100 MG capsule Take 1 capsule by mouth 3 (Three) Times a Day.    GLUCOSA-CHONDR-NA CHONDR-MSM PO Take 1 capsule by mouth Daily.    meloxicam (MOBIC) 7.5 MG tablet TAKE ONE TABLET BY MOUTH ONCE A DAY    multivitamin with minerals tablet tablet Take 1 tablet by mouth Daily.    Omega-3 Fatty Acids (fish oil) 1000 MG capsule capsule Take  by mouth Daily With Breakfast.    [DISCONTINUED] levothyroxine (SYNTHROID, LEVOTHROID) 25 MCG tablet TAKE ONE TABLET BY MOUTH EVERY MORNING ON AN EMPTY STOMACH WITH WATER ONLY AND WAIT 30-60 MINUTES BEFORE CONSUMING ANY OTHER LIQUIDS, FOOD OR MEDICATIONS    [DISCONTINUED] lisinopril-hydrochlorothiazide (PRINZIDE,ZESTORETIC) 20-25 MG per tablet Take 1 tablet by mouth Daily. (Patient taking differently: Take 1 tablet by mouth Daily. Pt is taking 1/2 tablet daily)    [DISCONTINUED] NON FORMULARY Prostate OTC     No current facility-administered medications on file prior to visit.     He has No Known Allergies..    Compared to one year ago, the patient feels his physical   health is the same.    Compared to one year ago, the patient feels his mental   health is the same.    Recent Hospitalizations:  He was not admitted to the  [facility] during the last year.       Current Medical Providers:  Patient Care Team:  Jamila Loco APRN as PCP - General (Nurse Practitioner)  Collin Harvey MD as Consulting Physician (Cardiology)    Outpatient Medications Prior to Visit   Medication Sig Dispense Refill    cyanocobalamin (VITAMIN B-12) 1000 MCG tablet Take 1 tablet by mouth Daily.      gabapentin (NEURONTIN) 100 MG capsule Take 1 capsule by mouth 3 (Three) Times a Day. 90 capsule 2    GLUCOSA-CHONDR-NA CHONDR-MSM PO Take 1 capsule by mouth Daily.      meloxicam (MOBIC) 7.5 MG tablet TAKE ONE TABLET BY MOUTH ONCE A DAY 90 tablet 3    multivitamin with minerals tablet tablet Take 1 tablet by mouth Daily.      Omega-3 Fatty Acids (fish oil) 1000 MG capsule capsule Take  by mouth Daily With Breakfast.      levothyroxine (SYNTHROID, LEVOTHROID) 25 MCG tablet TAKE ONE TABLET BY MOUTH EVERY MORNING ON AN EMPTY STOMACH WITH WATER ONLY AND WAIT 30-60 MINUTES BEFORE CONSUMING ANY OTHER LIQUIDS, FOOD OR MEDICATIONS 90 tablet 3    lisinopril-hydrochlorothiazide (PRINZIDE,ZESTORETIC) 20-25 MG per tablet Take 1 tablet by mouth Daily. (Patient taking differently: Take 1 tablet by mouth Daily. Pt is taking 1/2 tablet daily) 90 tablet 1    NON FORMULARY Prostate OTC       No facility-administered medications prior to visit.       No opioid medication identified on active medication list. I have reviewed chart for other potential  high risk medication/s and harmful drug interactions in the elderly.        Aspirin is not on active medication list.  Aspirin use is not indicated based on review of current medical condition/s. Risk of harm outweighs potential benefits.  .    Patient Active Problem List   Diagnosis    Essential hypertension    Aortic stenosis, mild    Respiratory insufficiency    Bilateral lower extremity edema    Grade I diastolic dysfunction    Closed fracture of transverse process of thoracic vertebra    Cervical strain, acute, sequela     "Cervical spondylosis without myelopathy    Arthropathy of hand    Benign prostatic hyperplasia    Chronic fatigue syndrome    Colon polyps    Hyperlipidemia    Hypothyroidism    Impotence of organic origin    Iron deficiency anemia    Primary osteoarthritis of left knee    Solitary pulmonary nodule    Staph infection    Vitamin D deficiency    Localized edema    Hematoma     Advance Care Planning   Advance Care Planning     Advance Directive is not on file.  ACP discussion was held with the patient during this visit. Patient does not have an advance directive, declines further assistance.     Objective    Vitals:    10/17/23 1029   BP: 115/80   BP Location: Right arm   Patient Position: Sitting   Cuff Size: Adult   Pulse: 84   SpO2: 98%   Weight: 74.8 kg (165 lb)   Height: 177.8 cm (70\")     Estimated body mass index is 23.68 kg/m² as calculated from the following:    Height as of this encounter: 177.8 cm (70\").    Weight as of this encounter: 74.8 kg (165 lb).    BMI is within normal parameters. No other follow-up for BMI required.      Does the patient have evidence of cognitive impairment? No          HEALTH RISK ASSESSMENT    Smoking Status:  Social History     Tobacco Use   Smoking Status Never   Smokeless Tobacco Never     Alcohol Consumption:  Social History     Substance and Sexual Activity   Alcohol Use Not Currently    Comment: Might have mixed drink monthly     Fall Risk Screen:    SAMANTHA Fall Risk Assessment has not been completed.    Depression Screening:      10/5/2022    11:40 AM   PHQ-2/PHQ-9 Depression Screening   Little Interest or Pleasure in Doing Things 0-->not at all   Feeling Down, Depressed or Hopeless 0-->not at all   PHQ-9: Brief Depression Severity Measure Score 0       Health Habits and Functional and Cognitive Screening:      10/5/2022    11:40 AM   Functional & Cognitive Status   Do you have difficulty preparing food and eating? No   Do you have difficulty bathing yourself, getting " dressed or grooming yourself? No   Do you have difficulty using the toilet? No   Do you have difficulty moving around from place to place? No   Do you have trouble with steps or getting out of a bed or a chair? No   Current Diet Well Balanced Diet   Dental Exam Up to date   Eye Exam Up to date   Exercise (times per week) 5 times per week   Current Exercises Include Yard Work;Walking;Other        Exercise Comment patient golfs frequently   Do you need help using the phone?  No   Are you deaf or do you have serious difficulty hearing?  No   Do you need help to go to places out of walking distance? No   Do you need help shopping? No   Do you need help preparing meals?  No   Do you need help with housework?  No   Do you need help with laundry? No   Do you need help taking your medications? No   Do you need help managing money? No   Do you ever drive or ride in a car without wearing a seat belt? No   Have you felt unusual stress, anger or loneliness in the last month? No   Who do you live with? Spouse   If you need help, do you have trouble finding someone available to you? Yes   Have you been bothered in the last four weeks by sexual problems? No   Do you have difficulty concentrating, remembering or making decisions? No       Age-appropriate Screening Schedule:  Refer to the list below for future screening recommendations based on patient's age, sex and/or medical conditions. Orders for these recommended tests are listed in the plan section. The patient has been provided with a written plan.    Health Maintenance   Topic Date Due    Pneumococcal Vaccine 65+ (2 - PCV) 10/14/2021    LIPID PANEL  09/28/2023    ANNUAL WELLNESS VISIT  10/17/2024    TDAP/TD VACCINES (2 - Tdap) 01/18/2029    COVID-19 Vaccine  Completed    INFLUENZA VACCINE  Completed    ZOSTER VACCINE  Completed                  CMS Preventative Services Quick Reference  Risk Factors Identified During Encounter  Immunizations Discussed/Encouraged: Prevnar 20  "(Pneumococcal 20-valent conjugate)  The above risks/problems have been discussed with the patient.  Pertinent information has been shared with the patient in the After Visit Summary.  An After Visit Summary and PPPS were made available to the patient.    Follow Up:   Next Medicare Wellness visit to be scheduled in 1 year.       Additional E&M Note during same encounter follows:  Patient has multiple medical problems which are significant and separately identifiable that require additional work above and beyond the Medicare Wellness Visit.      Chief Complaint  Medicare Wellness-subsequent (6 month follow up)    Subjective        HPI  Payam Owens is also being seen today for follow-up hypertension, hyperlipidemia, hypothyroidism, osteoarthritis of the left knee, impaired fasting glucose and vitamin D deficiency. The patient is not having any medication side effects.  Negative for chest pain, dizziness, headaches and fatigue. Shortness of breath no worse.    Specialist: Dr. Fernandez and Dr. Molina.  Cardiac cath was done 2020. Colonoscopy 2/2018 polyps removed per Dr. De Jesus.          Objective   Vital Signs:  /80 (BP Location: Right arm, Patient Position: Sitting, Cuff Size: Adult)   Pulse 84   Ht 177.8 cm (70\")   Wt 74.8 kg (165 lb)   SpO2 98%   BMI 23.68 kg/m²     Physical Exam  Vitals reviewed.   Constitutional:       General: He is not in acute distress.  HENT:      Head: Normocephalic and atraumatic.      Right Ear: Tympanic membrane and ear canal normal.      Left Ear: Tympanic membrane and ear canal normal.   Eyes:      Conjunctiva/sclera: Conjunctivae normal.   Cardiovascular:      Rate and Rhythm: Normal rate and regular rhythm.      Heart sounds: Murmur heard.   Pulmonary:      Effort: Pulmonary effort is normal.      Breath sounds: Normal breath sounds. No wheezing, rhonchi or rales.   Abdominal:      General: There is no distension.      Palpations: Abdomen is soft. There is no mass.      " Tenderness: There is no abdominal tenderness.   Musculoskeletal:      Right lower le+ Edema present.      Left lower le+ Edema present.   Lymphadenopathy:      Cervical: No cervical adenopathy.   Skin:     General: Skin is warm and dry.      Coloration: Skin is not jaundiced or pale.   Neurological:      General: No focal deficit present.      Mental Status: He is alert.   Psychiatric:         Mood and Affect: Mood normal.         Thought Content: Thought content normal.                 Assessment and Plan   Diagnoses and all orders for this visit:    1. Encounter for annual wellness exam in Medicare patient (Primary)    2. Essential hypertension    3. Hypothyroidism, unspecified type  -     levothyroxine (SYNTHROID, LEVOTHROID) 25 MCG tablet; TAKE ONE TABLET BY MOUTH EVERY MORNING ON AN EMPTY STOMACH WITH WATER ONLY AND WAIT 30-60 MINUTES BEFORE CONSUMING ANY OTHER LIQUIDS, FOOD OR MEDICATIONS  Dispense: 90 tablet; Refill: 3    4. Osteoarthritis of left knee, unspecified osteoarthritis type    5. Hyperlipidemia, unspecified hyperlipidemia type    6. Vitamin D deficiency    7. Impaired fasting glucose    Other orders  -     lisinopril-hydrochlorothiazide (PRINZIDE,ZESTORETIC) 20-25 MG per tablet; Take 1 tablet by mouth Daily.  Dispense: 90 tablet; Refill: 1      Annual exam: Care gaps reviewed.      Hypertension: Blood pressure well-controlled on lisinopril-HCTZ 20-25 mg daily and to continue.    Hypothyroidism: Stable on levothyroxine 25 mcg daily.     Osteoarthritis of left knee: Stable on meloxicam and to continue.     Hyperlipidemia: Continue lifestyle modifications.     Vitamin D deficiency: Taking multivitamin with D.     Impaired fasting glucose: Monitor.          Follow Up   Return in about 6 months (around 2024) for Recheck.  Patient was given instructions and counseling regarding his condition or for health maintenance advice. Please see specific information pulled into the AVS if appropriate.

## 2023-10-17 ENCOUNTER — OFFICE VISIT (OUTPATIENT)
Dept: INTERNAL MEDICINE | Facility: CLINIC | Age: 88
End: 2023-10-17
Payer: MEDICARE

## 2023-10-17 ENCOUNTER — LAB (OUTPATIENT)
Dept: LAB | Facility: HOSPITAL | Age: 88
End: 2023-10-17
Payer: MEDICARE

## 2023-10-17 VITALS
HEART RATE: 84 BPM | BODY MASS INDEX: 23.62 KG/M2 | HEIGHT: 70 IN | SYSTOLIC BLOOD PRESSURE: 115 MMHG | OXYGEN SATURATION: 98 % | WEIGHT: 165 LBS | DIASTOLIC BLOOD PRESSURE: 80 MMHG

## 2023-10-17 DIAGNOSIS — E78.5 HYPERLIPIDEMIA, UNSPECIFIED HYPERLIPIDEMIA TYPE: ICD-10-CM

## 2023-10-17 DIAGNOSIS — M17.12 OSTEOARTHRITIS OF LEFT KNEE, UNSPECIFIED OSTEOARTHRITIS TYPE: ICD-10-CM

## 2023-10-17 DIAGNOSIS — Z00.00 ENCOUNTER FOR ANNUAL WELLNESS EXAM IN MEDICARE PATIENT: Primary | ICD-10-CM

## 2023-10-17 DIAGNOSIS — E55.9 VITAMIN D DEFICIENCY: Chronic | ICD-10-CM

## 2023-10-17 DIAGNOSIS — E03.9 HYPOTHYROIDISM, UNSPECIFIED TYPE: ICD-10-CM

## 2023-10-17 DIAGNOSIS — E55.9 VITAMIN D DEFICIENCY: ICD-10-CM

## 2023-10-17 DIAGNOSIS — R73.01 IMPAIRED FASTING GLUCOSE: ICD-10-CM

## 2023-10-17 DIAGNOSIS — I10 ESSENTIAL HYPERTENSION: ICD-10-CM

## 2023-10-17 DIAGNOSIS — E03.9 HYPOTHYROIDISM, UNSPECIFIED TYPE: Chronic | ICD-10-CM

## 2023-10-17 LAB
25(OH)D3 SERPL-MCNC: 33 NG/ML (ref 30–100)
ALBUMIN SERPL-MCNC: 3.9 G/DL (ref 3.5–5.2)
ALBUMIN/GLOB SERPL: 1.4 G/DL
ALP SERPL-CCNC: 119 U/L (ref 39–117)
ALT SERPL W P-5'-P-CCNC: 13 U/L (ref 1–41)
ANION GAP SERPL CALCULATED.3IONS-SCNC: 10 MMOL/L (ref 5–15)
AST SERPL-CCNC: 24 U/L (ref 1–40)
BASOPHILS # BLD AUTO: 0.03 10*3/MM3 (ref 0–0.2)
BASOPHILS NFR BLD AUTO: 0.6 % (ref 0–1.5)
BILIRUB SERPL-MCNC: 0.3 MG/DL (ref 0–1.2)
BUN SERPL-MCNC: 31 MG/DL (ref 8–23)
BUN/CREAT SERPL: 25.4 (ref 7–25)
CALCIUM SPEC-SCNC: 9.2 MG/DL (ref 8.6–10.5)
CHLORIDE SERPL-SCNC: 103 MMOL/L (ref 98–107)
CHOLEST SERPL-MCNC: 170 MG/DL (ref 0–200)
CO2 SERPL-SCNC: 27 MMOL/L (ref 22–29)
CREAT SERPL-MCNC: 1.22 MG/DL (ref 0.76–1.27)
DEPRECATED RDW RBC AUTO: 39.9 FL (ref 37–54)
EGFRCR SERPLBLD CKD-EPI 2021: 57.7 ML/MIN/1.73
EOSINOPHIL # BLD AUTO: 0.21 10*3/MM3 (ref 0–0.4)
EOSINOPHIL NFR BLD AUTO: 4 % (ref 0.3–6.2)
ERYTHROCYTE [DISTWIDTH] IN BLOOD BY AUTOMATED COUNT: 11.7 % (ref 12.3–15.4)
GLOBULIN UR ELPH-MCNC: 2.7 GM/DL
GLUCOSE SERPL-MCNC: 96 MG/DL (ref 65–99)
HBA1C MFR BLD: 5.7 % (ref 4.8–5.6)
HCT VFR BLD AUTO: 38.1 % (ref 37.5–51)
HDLC SERPL-MCNC: 55 MG/DL (ref 40–60)
HGB BLD-MCNC: 13.1 G/DL (ref 13–17.7)
IMM GRANULOCYTES # BLD AUTO: 0.01 10*3/MM3 (ref 0–0.05)
IMM GRANULOCYTES NFR BLD AUTO: 0.2 % (ref 0–0.5)
LDLC SERPL CALC-MCNC: 107 MG/DL (ref 0–100)
LDLC/HDLC SERPL: 1.96 {RATIO}
LYMPHOCYTES # BLD AUTO: 1.76 10*3/MM3 (ref 0.7–3.1)
LYMPHOCYTES NFR BLD AUTO: 33.8 % (ref 19.6–45.3)
MCH RBC QN AUTO: 32.2 PG (ref 26.6–33)
MCHC RBC AUTO-ENTMCNC: 34.4 G/DL (ref 31.5–35.7)
MCV RBC AUTO: 93.6 FL (ref 79–97)
MONOCYTES # BLD AUTO: 0.37 10*3/MM3 (ref 0.1–0.9)
MONOCYTES NFR BLD AUTO: 7.1 % (ref 5–12)
NEUTROPHILS NFR BLD AUTO: 2.82 10*3/MM3 (ref 1.7–7)
NEUTROPHILS NFR BLD AUTO: 54.3 % (ref 42.7–76)
NRBC BLD AUTO-RTO: 0 /100 WBC (ref 0–0.2)
PLATELET # BLD AUTO: 194 10*3/MM3 (ref 140–450)
PMV BLD AUTO: 10.8 FL (ref 6–12)
POTASSIUM SERPL-SCNC: 4.2 MMOL/L (ref 3.5–5.2)
PROT SERPL-MCNC: 6.6 G/DL (ref 6–8.5)
RBC # BLD AUTO: 4.07 10*6/MM3 (ref 4.14–5.8)
SODIUM SERPL-SCNC: 140 MMOL/L (ref 136–145)
T4 FREE SERPL-MCNC: 1.23 NG/DL (ref 0.93–1.7)
TRIGL SERPL-MCNC: 36 MG/DL (ref 0–150)
TSH SERPL DL<=0.05 MIU/L-ACNC: 3.48 UIU/ML (ref 0.27–4.2)
VLDLC SERPL-MCNC: 8 MG/DL (ref 5–40)
WBC NRBC COR # BLD: 5.2 10*3/MM3 (ref 3.4–10.8)

## 2023-10-17 PROCEDURE — 84439 ASSAY OF FREE THYROXINE: CPT

## 2023-10-17 PROCEDURE — 80053 COMPREHEN METABOLIC PANEL: CPT

## 2023-10-17 PROCEDURE — 36415 COLL VENOUS BLD VENIPUNCTURE: CPT

## 2023-10-17 PROCEDURE — 83036 HEMOGLOBIN GLYCOSYLATED A1C: CPT

## 2023-10-17 PROCEDURE — 80061 LIPID PANEL: CPT

## 2023-10-17 PROCEDURE — 85025 COMPLETE CBC W/AUTO DIFF WBC: CPT

## 2023-10-17 PROCEDURE — 84443 ASSAY THYROID STIM HORMONE: CPT

## 2023-10-17 PROCEDURE — 82306 VITAMIN D 25 HYDROXY: CPT

## 2023-10-17 RX ORDER — LISINOPRIL AND HYDROCHLOROTHIAZIDE 25; 20 MG/1; MG/1
1 TABLET ORAL DAILY
Qty: 90 TABLET | Refills: 1 | Status: SHIPPED | OUTPATIENT
Start: 2023-10-17

## 2023-10-17 RX ORDER — LEVOTHYROXINE SODIUM 0.03 MG/1
TABLET ORAL
Qty: 90 TABLET | Refills: 3 | Status: SHIPPED | OUTPATIENT
Start: 2023-10-17

## 2023-10-20 DIAGNOSIS — E03.9 HYPOTHYROIDISM, UNSPECIFIED TYPE: ICD-10-CM

## 2023-10-20 DIAGNOSIS — R73.01 IMPAIRED FASTING GLUCOSE: ICD-10-CM

## 2023-10-20 DIAGNOSIS — E78.5 HYPERLIPIDEMIA, UNSPECIFIED HYPERLIPIDEMIA TYPE: ICD-10-CM

## 2023-10-20 DIAGNOSIS — I10 ESSENTIAL HYPERTENSION: Primary | ICD-10-CM

## 2023-10-20 DIAGNOSIS — E55.9 VITAMIN D DEFICIENCY: ICD-10-CM

## 2023-12-14 ENCOUNTER — OFFICE VISIT (OUTPATIENT)
Dept: NEUROSURGERY | Facility: CLINIC | Age: 88
End: 2023-12-14
Payer: MEDICARE

## 2023-12-14 VITALS
HEART RATE: 60 BPM | BODY MASS INDEX: 23.95 KG/M2 | SYSTOLIC BLOOD PRESSURE: 146 MMHG | DIASTOLIC BLOOD PRESSURE: 60 MMHG | HEIGHT: 70 IN | WEIGHT: 167.3 LBS

## 2023-12-14 DIAGNOSIS — M47.812 CERVICAL SPONDYLOSIS WITHOUT MYELOPATHY: Primary | ICD-10-CM

## 2023-12-14 RX ORDER — GABAPENTIN 100 MG/1
100 CAPSULE ORAL 2 TIMES DAILY
Qty: 60 CAPSULE | Refills: 5 | Status: SHIPPED | OUTPATIENT
Start: 2023-12-14 | End: 2023-12-14

## 2023-12-14 NOTE — PATIENT INSTRUCTIONS
-Gabapentin, increase to 200 mg twice daily  -Update in one month  -Follow up in 6 months   Chief Complaint  Diabetes (Follow up, med mgt, pump/cgm eval )    Referred By: No Known Provider    Subjective          Moy Granados presents to [facility] for diabetes medication management    History of Present Illness    Visit type:  follow-up  Diabetes type:  Type 2  Current diabetes status/concerns/issues: He has been having some problems with his infusion sites kinking causing him to have leaking insulin.  He is questioning if he might be able to transition to the OmniPod insulin pump.  Other health concerns: None reported  Current Diabetes symptoms:    Polyuria: No   Polydipsia: No   Polyphagia: No   Blurred vision: No   Excessive fatigue: No   Known Diabetes complications:  Neuropathy: None; Location: N/A  Renal: None  Eyes: Mild Nonproliferative Retinopathy; Location: Right  Amputation/Wounds: None  GI: None  Cardiovascular: None  ED: None  Other: None  Hypoglycemia:  Level 1 hypoglycemia (54 mg/dL - 70 mg/dL); Frequency - 3% per CGM  Hypoglycemia Symptoms:  shaking/tremors  Current diabetes treatment: He is using the tandem insulin pump with Humalog insulin.   Blood glucose device:  Dexcom CGM  Blood glucose monitoring frequency:  Continuous per CGM  Blood glucose range/average: The Dexcom continuous glucose sensor shows a glucose average of 185 mg/dL with 49% of glucose levels following target range while 48% are above target and 3% are below target.  He is having some hypoglycemia during the overnight hours.  Glucose levels are highest in the late afternoon and evening hours  Glucose Source: Device Reviewed  Diet:  Carbohydrate Counting, Limits high carb/sweet foods, Avoids sugary drinks  Activity/Exercise:  She is active with work    Past Medical History:   Diagnosis Date   • Type 1 diabetes      Past Surgical History:   Procedure Laterality Date   • EAR TUBES       Family History   Problem Relation Age of Onset   • Diabetes type I Other    • Diabetes type II Other       Social History     Socioeconomic History   • Marital status:    • Number of children: 2   Tobacco Use   • Smoking status: Former     Packs/day: 0.50     Types: Cigarettes     Quit date: 2019     Years since quittin.2   • Smokeless tobacco: Current     Types: Snuff   Vaping Use   • Vaping Use: Every day   • Substances: Flavoring   • Devices: Refillable tank   Substance and Sexual Activity   • Alcohol use: Not Currently   • Drug use: Never   • Sexual activity: Yes     Partners: Female     Comment: /wife      No Known Allergies    Current Outpatient Medications:   •  acetaminophen (TYLENOL) 500 MG tablet, Take 1 tablet by mouth Every 6 (Six) Hours As Needed for Mild Pain (prn for head aches and pther minor pain)., Disp: , Rfl:   •  Continuous Blood Gluc Sensor (Dexcom G6 Sensor), Every 10 (Ten) Days., Disp: 3 each, Rfl: 11  •  Continuous Blood Gluc Transmit (Dexcom G6 Transmitter) misc, 1 each Every 3 (Three) Months., Disp: 1 each, Rfl: 3  •  Glucagon (Gvoke HypoPen 2-Pack) 0.5 MG/0.1ML solution auto-injector, Inject 0.5 mg under the skin into the appropriate area as directed As Needed (use for severe hypoglycemia)., Disp: 0.1 mL, Rfl: 3  •  Insulin Glargine (LANTUS SOLOSTAR) 100 UNIT/ML injection pen, Inject 50 Units under the skin into the appropriate area as directed Every Night., Disp: 15 mL, Rfl: 2  •  Insulin Lispro (HumaLOG) 100 UNIT/ML injection, Inject 80 Units under the skin into the appropriate area as directed Daily. Per insulin pump, Disp: 30 mL, Rfl: 5  •  Insulin Disposable Pump (Omnipod 5 G6 Intro, Gen 5,) kit, 1 kit 1 (One) Time for 1 dose., Disp: 1 kit, Rfl: 0  •  Insulin Disposable Pump (Omnipod 5 G6 Pod, Gen 5,) misc, 1 each Every 3 (Three) Days., Disp: 10 each, Rfl: 5    Objective     Vitals:    23 0815   BP: 142/78   BP Location: Right arm   Patient Position: Lying   Cuff Size: Adult   Pulse: 102   Temp: 98.1 °F (36.7 °C)   SpO2: 97%   Weight: 94.8 kg (209 lb)   Height:  "182.9 cm (72\")   PainSc: 0-No pain     Body mass index is 28.35 kg/m².    Physical Exam  Constitutional:       Appearance: Normal appearance.      Comments: Overweight (BMI 25 - 29.9) Pt Current BMI = 28.35    HENT:      Head: Normocephalic and atraumatic.      Right Ear: External ear normal.      Left Ear: External ear normal.      Nose: Nose normal.   Eyes:      Extraocular Movements: Extraocular movements intact.      Conjunctiva/sclera: Conjunctivae normal.   Pulmonary:      Effort: Pulmonary effort is normal.   Musculoskeletal:         General: Normal range of motion.      Cervical back: Normal range of motion.   Skin:     General: Skin is warm and dry.   Neurological:      General: No focal deficit present.      Mental Status: He is alert and oriented to person, place, and time. Mental status is at baseline.   Psychiatric:         Mood and Affect: Mood normal.         Behavior: Behavior normal.         Thought Content: Thought content normal.         Judgment: Judgment normal.             Result Review :   The following data was reviewed by: KENJI Girard on 04/04/2023:    Most Recent A1C    HGBA1C Most Recent 1/3/23   Hemoglobin A1C 7.5             A1C Last 3 Results    HGBA1C Last 3 Results 6/21/22 9/26/22 1/3/23   Hemoglobin A1C 6.7 6.5 7.5           His most recent A1c was collected in January of this year.  Unfortunately we were unable to recheck the A1c in the office today due to lack of supplies    Glucose   Date Value Ref Range Status   04/04/2023 246 (H) 70 - 99 mg/dL Final     Comment:     Serial Number: [id number]:  [id number]     Point-of-care glucose in the office today is elevated at 246 mg/dL            Assessment: The patient's pump report shows both hypoglycemia and hyperglycemia.  He is having some overnight hypoglycemia and hyperglycemia in the late afternoon and evening hours.  The episodes of hypoglycemia are occurring while the patient is at work.  He expresses an " interest in the OmniPod insulin pump.  He feels like this will give him more infusion site availability as this can be worn on the arms and legs more easily than his current device.      Diagnoses and all orders for this visit:    1. Uncontrolled type 1 diabetes mellitus with hyperglycemia (Primary)  -     Continuous Blood Gluc Sensor (Dexcom G6 Sensor); Every 10 (Ten) Days.  Dispense: 3 each; Refill: 11  -     Continuous Blood Gluc Transmit (Dexcom G6 Transmitter) misc; 1 each Every 3 (Three) Months.  Dispense: 1 each; Refill: 3  -     Insulin Disposable Pump (Omnipod 5 G6 Intro, Gen 5,) kit; 1 kit 1 (One) Time for 1 dose.  Dispense: 1 kit; Refill: 0  -     Insulin Disposable Pump (Omnipod 5 G6 Pod, Gen 5,) misc; 1 each Every 3 (Three) Days.  Dispense: 10 each; Refill: 5    2. 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 Overweight (BMI 25.0-29.9)    Other orders  -     POC Glucose        Plan: To minimize the risk for nocturnal hypoglycemia the 12 AM to 4 AM basal rate of 2.2 was decreased to 2.0.  To minimize the hyperglycemia in the evening a 3 PM basal rate increased from 2.0 to 2.3 units/h was added.  No other changes were made to the current settings.  A prescription for the OmniPod insulin pump was sent to the pharmacy.  The patient will evaluate the cost of this pump to see if it is affordable to him.  If he elects to move forward with his device he will call the office to make an appointment with the nurse educator for training.    The patient will monitor his blood glucose levels using his continuous glucose sensor.  If he develops problematic hyperglycemia or hypoglycemia or adverse drug reactions, he will contact the office for further instructions.        Follow Up     Return in about 3 months (around 7/4/2023) for Pump Eval, CGM Follow-up.    Patient was given instructions and counseling regarding his condition or for health maintenance  advice. Please see specific information pulled into the AVS if appropriate.     Kym Alfaro, KENJI  04/04/2023      Dictated Utilizing Dragon Dictation.  Please note that portions of this note were completed with a voice recognition program.  Part of this note may be an electronic transcription/translation of spoken language to printed text using the Dragon Dictation System.

## 2023-12-14 NOTE — PROGRESS NOTES
"Chief Complaint  Follow-up (Patient has been taking gabapentin and seen improvement )    Subjective          Payam Owens who is a 88 y.o. year old male who presents to [facility] for follow up. Gabapentin.     History of Present Illness  Gabapentin is working well for him. He typically takes this around twice a day. This provides him benefit. Rarely will take a third. He has neck pain and stiffness. This will increase when lifting and raising the arms.       Interval History   Payam Owens who is a 88 y.o. year old male who presents to [facility] for follow up of neck pain.      History of Present Illness  MRI Cervical Spine on 9/13/23 at [facility] personally reviewed. Multilevel spondylosis, resulting in multilevel spinal canal and foraminal stenosis. At C6/7 there is moderate spinal stenosis. This is the most notable finding.        Pt has appreciated significant relief since starting the low dose of Gabapentin. He will typically take this once a day. May take two if needed. His pain is well managed with this.     Recent Interventions: Gabapentin      Review of Systems   Musculoskeletal:  Positive for myalgias, neck pain and neck stiffness.   All other systems reviewed and are negative.       Objective   Vital Signs:   /60 (BP Location: Left arm, Patient Position: Sitting, Cuff Size: Adult)   Pulse 60   Ht 177.8 cm (70\")   Wt 75.9 kg (167 lb 4.8 oz)   BMI 24.01 kg/m²       Physical Exam  Vitals reviewed.   Constitutional:       Appearance: Normal appearance.   Musculoskeletal:      Right shoulder: No tenderness. Normal range of motion.      Left shoulder: No tenderness. Normal range of motion.      Cervical back: Tenderness present. Pain with movement present. Decreased range of motion.   Neurological:      Mental Status: He is alert and oriented to person, place, and time.      Motor: Motor strength is normal.     Gait: " Gait is intact.      Deep Tendon Reflexes:      Reflex Scores:       Tricep reflexes are 2+ on the right side and 2+ on the left side.       Bicep reflexes are 2+ on the right side and 2+ on the left side.       Brachioradialis reflexes are 2+ on the right side and 2+ on the left side.       Neurologic Exam     Mental Status   Oriented to person, place, and time.   Level of consciousness: alert    Motor Exam   Muscle bulk: normal  Overall muscle tone: normal    Strength   Strength 5/5 throughout.     Sensory Exam   Light touch normal.     Gait, Coordination, and Reflexes     Gait  Gait: normal    Reflexes   Right brachioradialis: 2+  Left brachioradialis: 2+  Right biceps: 2+  Left biceps: 2+  Right triceps: 2+  Left triceps: 2+  Right Zuñiga: absent  Left Zuñgia: absent       Result Review :       Data reviewed : Radiologic studies MRI Cervical Spine on 9/13/23 at [facility] personally reviewed. Multilevel spondylosis, resulting in multilevel spinal canal and foraminal stenosis. At C6/7 there is moderate spinal stenosis. This is the most notable finding.             Assessment and Plan    Diagnoses and all orders for this visit:    1. Cervical spondylosis without myelopathy (Primary)  -     Discontinue: gabapentin (NEURONTIN) 100 MG capsule; Take 1 capsule by mouth 2 (Two) Times a Day.  Dispense: 60 capsule; Refill: 5    Gabapentin is providing him benefit. Will continue twice daily, trying to increase to 200 mg for efficacy. Follow up in 6 months.       Follow Up   Return in about 6 months (around 6/14/2024).  Patient was given instructions and counseling regarding his condition or for health maintenance advice.     -Gabapentin, increase to 200 mg twice daily  -Update in one month  -Follow up in 6 months

## 2024-02-14 ENCOUNTER — OFFICE VISIT (OUTPATIENT)
Dept: CARDIOLOGY | Facility: CLINIC | Age: 89
End: 2024-02-14
Payer: MEDICARE

## 2024-02-14 VITALS
DIASTOLIC BLOOD PRESSURE: 79 MMHG | BODY MASS INDEX: 24.02 KG/M2 | HEART RATE: 66 BPM | HEIGHT: 70 IN | SYSTOLIC BLOOD PRESSURE: 131 MMHG | WEIGHT: 167.8 LBS

## 2024-02-14 DIAGNOSIS — I35.1 TRACE AORTIC VALVE REGURGITATION: ICD-10-CM

## 2024-02-14 DIAGNOSIS — R60.0 BILATERAL LOWER EXTREMITY EDEMA: Primary | ICD-10-CM

## 2024-02-14 DIAGNOSIS — I10 ESSENTIAL HYPERTENSION: ICD-10-CM

## 2024-02-14 PROCEDURE — 1159F MED LIST DOCD IN RCRD: CPT | Performed by: INTERNAL MEDICINE

## 2024-02-14 PROCEDURE — 1160F RVW MEDS BY RX/DR IN RCRD: CPT | Performed by: INTERNAL MEDICINE

## 2024-02-14 PROCEDURE — 99214 OFFICE O/P EST MOD 30 MIN: CPT | Performed by: INTERNAL MEDICINE

## 2024-02-14 RX ORDER — GABAPENTIN 100 MG/1
100 CAPSULE ORAL EVERY 12 HOURS SCHEDULED
COMMUNITY
Start: 2023-12-14

## 2024-03-29 ENCOUNTER — TELEPHONE (OUTPATIENT)
Dept: INTERNAL MEDICINE | Age: 89
End: 2024-03-29
Payer: MEDICARE

## 2024-03-29 RX ORDER — MELOXICAM 7.5 MG/1
7.5 TABLET ORAL DAILY
Qty: 90 TABLET | Refills: 3 | Status: SHIPPED | OUTPATIENT
Start: 2024-03-29

## 2024-04-15 NOTE — PROGRESS NOTES
"Chief Complaint  Hypertension (6 month follow up, labs done today. The patient is having some \"tickling\" in his right ear at night. )  Subjective    History of Present Illness  Payam Owens is a 89 y.o. male  presents to [facility] for follow-up  hypertension, hyperlipidemia, hypothyroidism, osteoarthritis of the left knee, impaired fasting glucose and vitamin D deficiency. The patient has right ear irritation. The patient is concerned about short-term memory worsening. Negative for shortness of air. He has a cough that has been slightly yellow. He mowed yesterday and wore a mask. He says mowing makes his allergies flare.     Specialist: Dr. Fernandez and Dr. Molina.  Cardiac cath was done 2020. Colonoscopy 2/2018 polyps removed per Dr. De Jesus.       Past Medical History:   Diagnosis Date    Arthritis     Back problem     Back Decompression 1990    Breathing problem     patient had breathing problem 2 years ago    Chronic fatigue     Edema     Enlarged prostate     Essential hypertension 06/08/2020    History of echocardiogram 05/29/2020    EF = 66.0%, borderline concentric hypertrophy, diastolic dysfunction (grade I), moderate calcification of the aortic valve, moderate calcification of the aortic valve.Trace-to-mild aortic valve regurgitation, mild to moderate aortic stenosis, Mild MR/TR/ID    HL (hearing loss) 2000    Hearing not a problem    Hyperlipidemia, unspecified     Hypothyroidism, unspecified     Iron deficiency anemia, unspecified     Lung nodule     Neck injury 2021    Prostate disorder     tuna of prostate Dr. Torre [id number]    Unilateral primary osteoarthritis, left knee     Vitamin D deficiency, unspecified         Past Surgical History:   Procedure Laterality Date    BACK SURGERY      back fusion 1992    CARDIAC CATHETERIZATION N/A 07/21/2020    Procedure: Right and Left Heart Cath;  Surgeon: Collin Harvey MD;  Location: [facility] CATH INVASIVE LOCATION;  " "Service: Cardiology;  Laterality: N/A;    CARDIAC CATHETERIZATION N/A 07/21/2020    Procedure: Left ventriculography;  Surgeon: Collin Harvey MD;  Location:  [facility] CATH INVASIVE LOCATION;  Service: Cardiology;  Laterality: N/A;    CARDIAC CATHETERIZATION N/A 07/21/2020    Procedure: Coronary angiography;  Surgeon: Collin Harvey MD;  Location:  [facility] CATH INVASIVE LOCATION;  Service: Cardiology;  Laterality: N/A;    COLONOSCOPY  02/2018    EYE SURGERY  2000    Cataracts both eyes    PROSTATE SURGERY          No Known Allergies       Current Outpatient Medications:     cyanocobalamin (VITAMIN B-12) 1000 MCG tablet, Take 1 tablet by mouth Daily., Disp: , Rfl:     gabapentin (NEURONTIN) 100 MG capsule, Take 1 capsule by mouth Every 12 (Twelve) Hours., Disp: , Rfl:     levothyroxine (SYNTHROID, LEVOTHROID) 25 MCG tablet, TAKE ONE TABLET BY MOUTH EVERY MORNING ON AN EMPTY STOMACH WITH WATER ONLY AND WAIT 30-60 MINUTES BEFORE CONSUMING ANY OTHER LIQUIDS, FOOD OR MEDICATIONS, Disp: 90 tablet, Rfl: 3    lisinopril-hydrochlorothiazide (PRINZIDE,ZESTORETIC) 20-25 MG per tablet, Take 1 tablet by mouth Daily., Disp: 90 tablet, Rfl: 1    meloxicam (MOBIC) 7.5 MG tablet, Take 1 tablet by mouth Daily., Disp: 90 tablet, Rfl: 3    multivitamin with minerals tablet tablet, Take 1 tablet by mouth Daily., Disp: , Rfl:     NON FORMULARY, tumeric, Disp: , Rfl:     Omega-3 Fatty Acids (fish oil) 1000 MG capsule capsule, Take  by mouth Daily With Breakfast., Disp: , Rfl:     donepezil (Aricept) 5 MG tablet, Take 1 tablet by mouth Every Night., Disp: 90 tablet, Rfl: 1    Objective   /80 (BP Location: Left arm, Patient Position: Sitting, Cuff Size: Adult)   Pulse 70   Temp 97.2 °F (36.2 °C) (Temporal)   Ht 177.8 cm (70\")   Wt 73.8 kg (162 lb 12.8 oz)   SpO2 98%   BMI 23.36 kg/m²    Estimated body mass index is 23.36 kg/m² as calculated from the following:    Height as of this encounter: 177.8 cm (70\").    Weight as of this " encounter: 73.8 kg (162 lb 12.8 oz).   Physical Exam  Vitals reviewed.   Constitutional:       General: He is not in acute distress.  HENT:      Head: Normocephalic and atraumatic.      Right Ear: Tympanic membrane normal.      Left Ear: Tympanic membrane normal.      Ears:      Comments: Large amount of ear wax bilateral.   Eyes:      Conjunctiva/sclera: Conjunctivae normal.   Cardiovascular:      Rate and Rhythm: Normal rate and regular rhythm.      Heart sounds: Murmur heard.   Pulmonary:      Effort: Pulmonary effort is normal.      Breath sounds: Examination of the left-middle field reveals wheezing. Examination of the left-lower field reveals wheezing. Wheezing present. No rhonchi or rales.   Abdominal:      General: There is no distension.      Palpations: Abdomen is soft. There is no mass.      Tenderness: There is no abdominal tenderness.   Musculoskeletal:      Right lower leg: Edema present.      Left lower leg: Edema present.      Comments: Trace of pitting edema in LE bilateral.   Lymphadenopathy:      Cervical: No cervical adenopathy.   Skin:     General: Skin is warm and dry.      Coloration: Skin is not jaundiced or pale.   Neurological:      General: No focal deficit present.      Mental Status: He is alert.   Psychiatric:         Mood and Affect: Mood normal.         Thought Content: Thought content normal.        Result Review :  The following data was reviewed by: CHAPO Lafleur on 04/17/2024:  Common labs          10/17/2023    11:38   Common Labs   Glucose 96    BUN 31    Creatinine 1.22    Sodium 140    Potassium 4.2    Chloride 103    Calcium 9.2    Albumin 3.9    Total Bilirubin 0.3    Alkaline Phosphatase 119    AST (SGOT) 24    ALT (SGPT) 13    WBC 5.20    Hemoglobin 13.1    Hematocrit 38.1    Platelets 194    Total Cholesterol 170    Triglycerides 36    HDL Cholesterol 55    LDL Cholesterol  107    Hemoglobin A1C 5.70                  Assessment and Plan   Diagnoses and all orders  for this visit:    1. Essential hypertension (Primary)    2. Hypothyroidism, unspecified type    3. Osteoarthritis of left knee, unspecified osteoarthritis type    4. Hyperlipidemia, unspecified hyperlipidemia type    5. Vitamin D deficiency    6. Impaired fasting glucose    7. Wheezing  -     XR Chest PA & Lateral; Future    8. Poor short term memory    Other orders  -     donepezil (Aricept) 5 MG tablet; Take 1 tablet by mouth Every Night.  Dispense: 90 tablet; Refill: 1       Hypertension: Blood pressure well-controlled on lisinopril-HCTZ 20-25 mg daily and to continue.     Hypothyroidism: Stable on levothyroxine 25 mcg daily.      Osteoarthritis of left knee: Stable on meloxicam and to continue.      Hyperlipidemia: Continue lifestyle modifications.      Vitamin D deficiency: Taking multivitamin with D.      Impaired fasting glucose: Monitor    Poor short term memory: Start Aricept 5 mg daily.     Wheezing: CXR today.    BMI is within normal parameters. No other follow-up for BMI required.     Patient was given instructions and counseling regarding his condition or for health maintenance advice. Please see specific information pulled into the AVS if appropriate.     Follow Up   Return in about 6 months (around 10/18/2024) for Medicare Wellness, Recheck.    Dictated Utilizing Dragon Dictation.  Please note that portions of this note were completed with a voice recognition program.  Part of this note may be an electronic transcription/translation of spoken language to printed text using the Dragon Dictation System.    CHAPO Lafleur

## 2024-04-17 ENCOUNTER — OFFICE VISIT (OUTPATIENT)
Dept: INTERNAL MEDICINE | Age: 89
End: 2024-04-17
Payer: MEDICARE

## 2024-04-17 ENCOUNTER — HOSPITAL ENCOUNTER (OUTPATIENT)
Dept: GENERAL RADIOLOGY | Facility: HOSPITAL | Age: 89
Discharge: HOME | End: 2024-04-17
Payer: MEDICARE

## 2024-04-17 ENCOUNTER — LAB (OUTPATIENT)
Dept: LAB | Facility: HOSPITAL | Age: 89
End: 2024-04-17
Payer: MEDICARE

## 2024-04-17 VITALS
WEIGHT: 162.8 LBS | TEMPERATURE: 97.2 F | HEIGHT: 70 IN | BODY MASS INDEX: 23.31 KG/M2 | OXYGEN SATURATION: 98 % | DIASTOLIC BLOOD PRESSURE: 80 MMHG | HEART RATE: 70 BPM | SYSTOLIC BLOOD PRESSURE: 115 MMHG

## 2024-04-17 DIAGNOSIS — E78.5 HYPERLIPIDEMIA, UNSPECIFIED HYPERLIPIDEMIA TYPE: ICD-10-CM

## 2024-04-17 DIAGNOSIS — D50.9 IRON DEFICIENCY ANEMIA, UNSPECIFIED IRON DEFICIENCY ANEMIA TYPE: Primary | ICD-10-CM

## 2024-04-17 DIAGNOSIS — E03.9 HYPOTHYROIDISM, UNSPECIFIED TYPE: ICD-10-CM

## 2024-04-17 DIAGNOSIS — M17.12 OSTEOARTHRITIS OF LEFT KNEE, UNSPECIFIED OSTEOARTHRITIS TYPE: ICD-10-CM

## 2024-04-17 DIAGNOSIS — D50.9 IRON DEFICIENCY ANEMIA, UNSPECIFIED IRON DEFICIENCY ANEMIA TYPE: ICD-10-CM

## 2024-04-17 DIAGNOSIS — I10 ESSENTIAL HYPERTENSION: Primary | Chronic | ICD-10-CM

## 2024-04-17 DIAGNOSIS — R73.01 IMPAIRED FASTING GLUCOSE: ICD-10-CM

## 2024-04-17 DIAGNOSIS — R06.2 WHEEZING: ICD-10-CM

## 2024-04-17 DIAGNOSIS — E55.9 VITAMIN D DEFICIENCY: ICD-10-CM

## 2024-04-17 DIAGNOSIS — R41.3 POOR SHORT TERM MEMORY: ICD-10-CM

## 2024-04-17 DIAGNOSIS — I10 ESSENTIAL HYPERTENSION: ICD-10-CM

## 2024-04-17 LAB
25(OH)D3 SERPL-MCNC: 35.9 NG/ML (ref 30–100)
ALBUMIN SERPL-MCNC: 3.7 G/DL (ref 3.5–5.2)
ALBUMIN/GLOB SERPL: 1.5 G/DL
ALP SERPL-CCNC: 96 U/L (ref 39–117)
ALT SERPL W P-5'-P-CCNC: 10 U/L (ref 1–41)
ANION GAP SERPL CALCULATED.3IONS-SCNC: 8.7 MMOL/L (ref 5–15)
AST SERPL-CCNC: 27 U/L (ref 1–40)
BASOPHILS # BLD AUTO: 0.03 10*3/MM3 (ref 0–0.2)
BASOPHILS NFR BLD AUTO: 0.7 % (ref 0–1.5)
BILIRUB SERPL-MCNC: 0.3 MG/DL (ref 0–1.2)
BUN SERPL-MCNC: 45 MG/DL (ref 8–23)
BUN/CREAT SERPL: 31.7 (ref 7–25)
CALCIUM SPEC-SCNC: 9.1 MG/DL (ref 8.6–10.5)
CHLORIDE SERPL-SCNC: 105 MMOL/L (ref 98–107)
CHOLEST SERPL-MCNC: 156 MG/DL (ref 0–200)
CO2 SERPL-SCNC: 26.3 MMOL/L (ref 22–29)
CREAT SERPL-MCNC: 1.42 MG/DL (ref 0.76–1.27)
DEPRECATED RDW RBC AUTO: 41.4 FL (ref 37–54)
EGFRCR SERPLBLD CKD-EPI 2021: 47.8 ML/MIN/1.73
EOSINOPHIL # BLD AUTO: 0.22 10*3/MM3 (ref 0–0.4)
EOSINOPHIL NFR BLD AUTO: 4.9 % (ref 0.3–6.2)
ERYTHROCYTE [DISTWIDTH] IN BLOOD BY AUTOMATED COUNT: 12.2 % (ref 12.3–15.4)
FERRITIN SERPL-MCNC: 91.6 NG/ML (ref 30–400)
FOLATE SERPL-MCNC: >20 NG/ML (ref 4.78–24.2)
GLOBULIN UR ELPH-MCNC: 2.5 GM/DL
GLUCOSE SERPL-MCNC: 105 MG/DL (ref 65–99)
HBA1C MFR BLD: 5.8 % (ref 4.8–5.6)
HCT VFR BLD AUTO: 36.3 % (ref 37.5–51)
HDLC SERPL-MCNC: 40 MG/DL (ref 40–60)
HGB BLD-MCNC: 12.2 G/DL (ref 13–17.7)
IMM GRANULOCYTES # BLD AUTO: 0.01 10*3/MM3 (ref 0–0.05)
IMM GRANULOCYTES NFR BLD AUTO: 0.2 % (ref 0–0.5)
IRON 24H UR-MRATE: 111 MCG/DL (ref 59–158)
IRON SATN MFR SERPL: 33 % (ref 20–50)
LDLC SERPL CALC-MCNC: 107 MG/DL (ref 0–100)
LDLC/HDLC SERPL: 2.71 {RATIO}
LYMPHOCYTES # BLD AUTO: 1.59 10*3/MM3 (ref 0.7–3.1)
LYMPHOCYTES NFR BLD AUTO: 35.3 % (ref 19.6–45.3)
MAGNESIUM SERPL-MCNC: 2.6 MG/DL (ref 1.6–2.4)
MCH RBC QN AUTO: 31 PG (ref 26.6–33)
MCHC RBC AUTO-ENTMCNC: 33.6 G/DL (ref 31.5–35.7)
MCV RBC AUTO: 92.1 FL (ref 79–97)
MONOCYTES # BLD AUTO: 0.35 10*3/MM3 (ref 0.1–0.9)
MONOCYTES NFR BLD AUTO: 7.8 % (ref 5–12)
NEUTROPHILS NFR BLD AUTO: 2.3 10*3/MM3 (ref 1.7–7)
NEUTROPHILS NFR BLD AUTO: 51.1 % (ref 42.7–76)
NRBC BLD AUTO-RTO: 0 /100 WBC (ref 0–0.2)
PLATELET # BLD AUTO: 216 10*3/MM3 (ref 140–450)
PMV BLD AUTO: 10.7 FL (ref 6–12)
POTASSIUM SERPL-SCNC: 4.6 MMOL/L (ref 3.5–5.2)
PROT SERPL-MCNC: 6.2 G/DL (ref 6–8.5)
RBC # BLD AUTO: 3.94 10*6/MM3 (ref 4.14–5.8)
SODIUM SERPL-SCNC: 140 MMOL/L (ref 136–145)
T4 FREE SERPL-MCNC: 1.26 NG/DL (ref 0.93–1.7)
TIBC SERPL-MCNC: 341 MCG/DL (ref 298–536)
TRANSFERRIN SERPL-MCNC: 229 MG/DL (ref 200–360)
TRIGL SERPL-MCNC: 39 MG/DL (ref 0–150)
TSH SERPL DL<=0.05 MIU/L-ACNC: 3.96 UIU/ML (ref 0.27–4.2)
VIT B12 BLD-MCNC: 802 PG/ML (ref 211–946)
VLDLC SERPL-MCNC: 9 MG/DL (ref 5–40)
WBC NRBC COR # BLD AUTO: 4.5 10*3/MM3 (ref 3.4–10.8)

## 2024-04-17 PROCEDURE — 1160F RVW MEDS BY RX/DR IN RCRD: CPT | Performed by: NURSE PRACTITIONER

## 2024-04-17 PROCEDURE — 84466 ASSAY OF TRANSFERRIN: CPT

## 2024-04-17 PROCEDURE — 83540 ASSAY OF IRON: CPT

## 2024-04-17 PROCEDURE — 83036 HEMOGLOBIN GLYCOSYLATED A1C: CPT

## 2024-04-17 PROCEDURE — 83735 ASSAY OF MAGNESIUM: CPT

## 2024-04-17 PROCEDURE — 82607 VITAMIN B-12: CPT

## 2024-04-17 PROCEDURE — 84439 ASSAY OF FREE THYROXINE: CPT

## 2024-04-17 PROCEDURE — 84443 ASSAY THYROID STIM HORMONE: CPT

## 2024-04-17 PROCEDURE — 80061 LIPID PANEL: CPT

## 2024-04-17 PROCEDURE — 71046 X-RAY EXAM CHEST 2 VIEWS: CPT

## 2024-04-17 PROCEDURE — 85025 COMPLETE CBC W/AUTO DIFF WBC: CPT

## 2024-04-17 PROCEDURE — 82746 ASSAY OF FOLIC ACID SERUM: CPT

## 2024-04-17 PROCEDURE — 82728 ASSAY OF FERRITIN: CPT

## 2024-04-17 PROCEDURE — 80053 COMPREHEN METABOLIC PANEL: CPT

## 2024-04-17 PROCEDURE — 36415 COLL VENOUS BLD VENIPUNCTURE: CPT

## 2024-04-17 PROCEDURE — 1159F MED LIST DOCD IN RCRD: CPT | Performed by: NURSE PRACTITIONER

## 2024-04-17 PROCEDURE — 82306 VITAMIN D 25 HYDROXY: CPT

## 2024-04-17 PROCEDURE — 99214 OFFICE O/P EST MOD 30 MIN: CPT | Performed by: NURSE PRACTITIONER

## 2024-04-17 RX ORDER — DONEPEZIL HYDROCHLORIDE 5 MG/1
5 TABLET, FILM COATED ORAL NIGHTLY
Qty: 90 TABLET | Refills: 1 | Status: SHIPPED | OUTPATIENT
Start: 2024-04-17

## 2024-04-19 DIAGNOSIS — E03.9 HYPOTHYROIDISM, UNSPECIFIED TYPE: Primary | ICD-10-CM

## 2024-04-19 DIAGNOSIS — E55.9 VITAMIN D DEFICIENCY: ICD-10-CM

## 2024-04-19 DIAGNOSIS — I10 ESSENTIAL HYPERTENSION: ICD-10-CM

## 2024-04-19 DIAGNOSIS — R73.01 IMPAIRED FASTING GLUCOSE: ICD-10-CM

## 2024-04-19 DIAGNOSIS — E78.5 HYPERLIPIDEMIA, UNSPECIFIED HYPERLIPIDEMIA TYPE: ICD-10-CM

## 2024-04-22 ENCOUNTER — TELEPHONE (OUTPATIENT)
Dept: INTERNAL MEDICINE | Age: 89
End: 2024-04-22
Payer: MEDICARE

## 2024-04-22 DIAGNOSIS — Z12.5 SCREENING PSA (PROSTATE SPECIFIC ANTIGEN): Primary | ICD-10-CM

## 2024-06-18 ENCOUNTER — OFFICE VISIT (OUTPATIENT)
Dept: NEUROSURGERY | Facility: CLINIC | Age: 89
End: 2024-06-18
Payer: MEDICARE

## 2024-06-18 VITALS
DIASTOLIC BLOOD PRESSURE: 52 MMHG | WEIGHT: 165.4 LBS | SYSTOLIC BLOOD PRESSURE: 98 MMHG | HEIGHT: 70 IN | HEART RATE: 67 BPM | BODY MASS INDEX: 23.68 KG/M2

## 2024-06-18 DIAGNOSIS — M47.812 CERVICAL SPONDYLOSIS WITHOUT MYELOPATHY: Primary | ICD-10-CM

## 2024-06-18 PROCEDURE — 99213 OFFICE O/P EST LOW 20 MIN: CPT | Performed by: NURSE PRACTITIONER

## 2024-06-18 PROCEDURE — 1160F RVW MEDS BY RX/DR IN RCRD: CPT | Performed by: NURSE PRACTITIONER

## 2024-06-18 PROCEDURE — 1159F MED LIST DOCD IN RCRD: CPT | Performed by: NURSE PRACTITIONER

## 2024-06-18 RX ORDER — GABAPENTIN 100 MG/1
100 CAPSULE ORAL EVERY 12 HOURS SCHEDULED
Qty: 60 CAPSULE | Refills: 2 | Status: SHIPPED | OUTPATIENT
Start: 2024-06-18

## 2024-06-18 RX ORDER — FERROUS SULFATE 325(65) MG
325 TABLET ORAL
COMMUNITY

## 2024-06-18 RX ORDER — MAGNESIUM OXIDE 400 MG/1
400 TABLET ORAL DAILY
COMMUNITY

## 2024-06-18 NOTE — PROGRESS NOTES
"Chief Complaint  Follow-up (Patient has questions about gabapentin, he ran out 3 weeks ago.)    Subjective          Payam Owens who is a 89 y.o. year old male who presents to [facility] for follow up of neck pain.     History of Present Illness  Pt has been doing well. He has started taking Tylenol arthritis, around once a day which has been providing him benefit. He has been out of the Gabapentin, which he has noticed a change in his mood without it.     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       Interval History   Payam Owens who is a 88 y.o. year old male who presents to [facility] for follow up. Gabapentin.      History of Present Illness  Gabapentin is working well for him. He typically takes this around twice a day. This provides him benefit. Rarely will take a third. He has neck pain and stiffness. This will increase when lifting and raising the arms.      Recent Interventions: Gabapentin and Tylenol      Review of Systems   Musculoskeletal:  Positive for myalgias, neck pain and neck stiffness.   All other systems reviewed and are negative.       Objective   Vital Signs:   BP 98/52 (BP Location: Left arm, Patient Position: Sitting, Cuff Size: Adult)   Pulse 67   Ht 177.8 cm (70\")   Wt 75 kg (165 lb 6.4 oz)   BMI 23.73 kg/m²       Physical Exam  Vitals reviewed.   Constitutional:       Appearance: Normal appearance.   Musculoskeletal:      Right shoulder: No tenderness. Normal range of motion.      Left shoulder: No tenderness. Normal range of motion.      Cervical back: Tenderness present. Pain with movement present. Decreased range of motion.   Neurological:      Mental Status: He is alert and oriented to person, place, and time.      Motor: Motor strength is normal.     Gait: Gait is intact.      Deep Tendon Reflexes:      Reflex Scores:       Tricep reflexes are 2+ on the right side and " 2+ on the left side.       Bicep reflexes are 2+ on the right side and 2+ on the left side.       Brachioradialis reflexes are 2+ on the right side and 2+ on the left side.       Neurologic Exam     Mental Status   Oriented to person, place, and time.   Level of consciousness: alert    Motor Exam   Muscle bulk: normal  Overall muscle tone: normal    Strength   Strength 5/5 throughout.     Sensory Exam   Light touch normal.     Gait, Coordination, and Reflexes     Gait  Gait: normal    Reflexes   Right brachioradialis: 2+  Left brachioradialis: 2+  Right biceps: 2+  Left biceps: 2+  Right triceps: 2+  Left triceps: 2+  Right Zuñiga: absent  Left Zuñiga: absent       Result Review :                 Assessment and Plan    Diagnoses and all orders for this visit:    1. Cervical spondylosis without myelopathy (Primary)  -     gabapentin (NEURONTIN) 100 MG capsule; Take 1 capsule by mouth Every 12 (Twelve) Hours.  Dispense: 60 capsule; Refill: 2    Pt is doing well. Will continue Gabapentin. He can take tylenol as needed. Follow up in 1 year.       Follow Up   Return in about 1 year (around 6/18/2025).  Patient was given instructions and counseling regarding his condition or for health maintenance advice.     -Gabapentin 100 mg twice daily  -Tylenol as needed  -Follow up in 1 year

## 2024-08-19 RX ORDER — LISINOPRIL AND HYDROCHLOROTHIAZIDE 25; 20 MG/1; MG/1
1 TABLET ORAL DAILY
Qty: 90 TABLET | Refills: 1 | Status: SHIPPED | OUTPATIENT
Start: 2024-08-19

## 2024-08-30 ENCOUNTER — TELEPHONE (OUTPATIENT)
Dept: INTERNAL MEDICINE | Age: 89
End: 2024-08-30
Payer: MEDICARE

## 2024-08-30 DIAGNOSIS — M54.2 NECK PAIN: Primary | ICD-10-CM

## 2024-08-30 NOTE — TELEPHONE ENCOUNTER
Caller: Payam Owens    Relationship: Self    Best call back number:     [phone]        What is the medical concern/diagnosis: NECK PROBLEMS    What specialty or service is being requested: NEUROSURGERY    What is the provider, practice or medical service name: DR. DOAN AT [facility]    What is the office location: [place]    What is the office phone number: [phone]    Any additional details: WOULD LIKE FOR YOU TO GET ALL RECORDS FROM DR. TREJO AND FAX TO DR. DOAN

## 2024-10-16 ENCOUNTER — LAB (OUTPATIENT)
Dept: INTERNAL MEDICINE | Age: 89
End: 2024-10-16
Payer: MEDICARE

## 2024-10-16 DIAGNOSIS — E78.5 HYPERLIPIDEMIA, UNSPECIFIED HYPERLIPIDEMIA TYPE: ICD-10-CM

## 2024-10-16 DIAGNOSIS — E03.9 HYPOTHYROIDISM, UNSPECIFIED TYPE: ICD-10-CM

## 2024-10-16 DIAGNOSIS — E55.9 VITAMIN D DEFICIENCY: ICD-10-CM

## 2024-10-16 DIAGNOSIS — Z12.5 SCREENING PSA (PROSTATE SPECIFIC ANTIGEN): ICD-10-CM

## 2024-10-16 DIAGNOSIS — I10 ESSENTIAL HYPERTENSION: ICD-10-CM

## 2024-10-16 DIAGNOSIS — R73.01 IMPAIRED FASTING GLUCOSE: ICD-10-CM

## 2024-10-18 ENCOUNTER — LAB (OUTPATIENT)
Dept: INTERNAL MEDICINE | Age: 89
End: 2024-10-18
Payer: MEDICARE

## 2024-10-18 ENCOUNTER — LAB (OUTPATIENT)
Dept: LAB | Facility: HOSPITAL | Age: 89
End: 2024-10-18
Payer: MEDICARE

## 2024-10-18 LAB
25(OH)D3 SERPL-MCNC: 35.2 NG/ML (ref 30–100)
ALBUMIN SERPL-MCNC: 3.5 G/DL (ref 3.5–5.2)
ALBUMIN/GLOB SERPL: 1.3 G/DL
ALP SERPL-CCNC: 93 U/L (ref 39–117)
ALT SERPL W P-5'-P-CCNC: 14 U/L (ref 1–41)
ANION GAP SERPL CALCULATED.3IONS-SCNC: 11.3 MMOL/L (ref 5–15)
AST SERPL-CCNC: 24 U/L (ref 1–40)
BASOPHILS # BLD AUTO: 0.03 10*3/MM3 (ref 0–0.2)
BASOPHILS NFR BLD AUTO: 0.6 % (ref 0–1.5)
BILIRUB SERPL-MCNC: 0.3 MG/DL (ref 0–1.2)
BUN SERPL-MCNC: 29 MG/DL (ref 8–23)
BUN/CREAT SERPL: 22.1 (ref 7–25)
CALCIUM SPEC-SCNC: 9.1 MG/DL (ref 8.6–10.5)
CHLORIDE SERPL-SCNC: 101 MMOL/L (ref 98–107)
CHOLEST SERPL-MCNC: 162 MG/DL (ref 0–200)
CO2 SERPL-SCNC: 23.7 MMOL/L (ref 22–29)
CREAT SERPL-MCNC: 1.31 MG/DL (ref 0.76–1.27)
DEPRECATED RDW RBC AUTO: 41.5 FL (ref 37–54)
EGFRCR SERPLBLD CKD-EPI 2021: 52.7 ML/MIN/1.73
EOSINOPHIL # BLD AUTO: 0.2 10*3/MM3 (ref 0–0.4)
EOSINOPHIL NFR BLD AUTO: 4.1 % (ref 0.3–6.2)
ERYTHROCYTE [DISTWIDTH] IN BLOOD BY AUTOMATED COUNT: 12 % (ref 12.3–15.4)
GLOBULIN UR ELPH-MCNC: 2.8 GM/DL
GLUCOSE SERPL-MCNC: 92 MG/DL (ref 65–99)
HBA1C MFR BLD: 5.4 % (ref 4.8–5.6)
HCT VFR BLD AUTO: 35.9 % (ref 37.5–51)
HDLC SERPL-MCNC: 60 MG/DL (ref 40–60)
HGB BLD-MCNC: 11.7 G/DL (ref 13–17.7)
IMM GRANULOCYTES # BLD AUTO: 0.02 10*3/MM3 (ref 0–0.05)
IMM GRANULOCYTES NFR BLD AUTO: 0.4 % (ref 0–0.5)
LDLC SERPL CALC-MCNC: 95 MG/DL (ref 0–100)
LDLC/HDLC SERPL: 1.6 {RATIO}
LYMPHOCYTES # BLD AUTO: 1.42 10*3/MM3 (ref 0.7–3.1)
LYMPHOCYTES NFR BLD AUTO: 29.2 % (ref 19.6–45.3)
MAGNESIUM SERPL-MCNC: 2.2 MG/DL (ref 1.6–2.4)
MCH RBC QN AUTO: 31.1 PG (ref 26.6–33)
MCHC RBC AUTO-ENTMCNC: 32.6 G/DL (ref 31.5–35.7)
MCV RBC AUTO: 95.5 FL (ref 79–97)
MONOCYTES # BLD AUTO: 0.42 10*3/MM3 (ref 0.1–0.9)
MONOCYTES NFR BLD AUTO: 8.6 % (ref 5–12)
NEUTROPHILS NFR BLD AUTO: 2.77 10*3/MM3 (ref 1.7–7)
NEUTROPHILS NFR BLD AUTO: 57.1 % (ref 42.7–76)
NRBC BLD AUTO-RTO: 0 /100 WBC (ref 0–0.2)
PLATELET # BLD AUTO: 193 10*3/MM3 (ref 140–450)
PMV BLD AUTO: 11.1 FL (ref 6–12)
POTASSIUM SERPL-SCNC: 4.2 MMOL/L (ref 3.5–5.2)
PROT SERPL-MCNC: 6.3 G/DL (ref 6–8.5)
PSA SERPL-MCNC: 2.22 NG/ML (ref 0–4)
RBC # BLD AUTO: 3.76 10*6/MM3 (ref 4.14–5.8)
SODIUM SERPL-SCNC: 136 MMOL/L (ref 136–145)
T4 FREE SERPL-MCNC: 1.21 NG/DL (ref 0.92–1.68)
TRIGL SERPL-MCNC: 31 MG/DL (ref 0–150)
TSH SERPL DL<=0.05 MIU/L-ACNC: 2.51 UIU/ML (ref 0.27–4.2)
VLDLC SERPL-MCNC: 7 MG/DL (ref 5–40)
WBC NRBC COR # BLD AUTO: 4.86 10*3/MM3 (ref 3.4–10.8)

## 2024-10-18 PROCEDURE — G0103 PSA SCREENING: HCPCS | Performed by: NURSE PRACTITIONER

## 2024-10-18 PROCEDURE — 82306 VITAMIN D 25 HYDROXY: CPT | Performed by: NURSE PRACTITIONER

## 2024-10-18 PROCEDURE — 85025 COMPLETE CBC W/AUTO DIFF WBC: CPT | Performed by: NURSE PRACTITIONER

## 2024-10-18 PROCEDURE — 83036 HEMOGLOBIN GLYCOSYLATED A1C: CPT | Performed by: NURSE PRACTITIONER

## 2024-10-18 PROCEDURE — 83735 ASSAY OF MAGNESIUM: CPT | Performed by: NURSE PRACTITIONER

## 2024-10-18 PROCEDURE — 84439 ASSAY OF FREE THYROXINE: CPT | Performed by: NURSE PRACTITIONER

## 2024-10-18 PROCEDURE — 80053 COMPREHEN METABOLIC PANEL: CPT | Performed by: NURSE PRACTITIONER

## 2024-10-18 PROCEDURE — 80061 LIPID PANEL: CPT | Performed by: NURSE PRACTITIONER

## 2024-10-18 PROCEDURE — 84443 ASSAY THYROID STIM HORMONE: CPT | Performed by: NURSE PRACTITIONER

## 2024-10-18 PROCEDURE — 36415 COLL VENOUS BLD VENIPUNCTURE: CPT

## 2024-10-21 DIAGNOSIS — R41.3 POOR SHORT TERM MEMORY: Primary | ICD-10-CM

## 2024-10-21 RX ORDER — DONEPEZIL HYDROCHLORIDE 5 MG/1
5 TABLET, FILM COATED ORAL NIGHTLY
Qty: 90 TABLET | Refills: 1 | Status: SHIPPED | OUTPATIENT
Start: 2024-10-21

## 2024-11-13 NOTE — PROGRESS NOTES
Subjective   The ABCs of the Annual Wellness Visit  Medicare Wellness Visit      Payam Owens is a 89 y.o. patient who presents for a Medicare Wellness Visit.    The following portions of the patient's history were reviewed and   updated as appropriate: allergies, current medications, past family history, past medical history, past social history, past surgical history, and problem list.    Compared to one year ago, the patient's physical   health is the same.  Compared to one year ago, the patient's mental   health is the same.    Recent Hospitalizations:  He was not admitted to the hospital during the last year.     Current Medical Providers:  Patient Care Team:  Jamila Loco APRN as PCP - General (Nurse Practitioner)  Marlo Gamble MD as Surgeon (Neurosurgery)  Milton De Jesus MD as Consulting Physician (General Surgery)  PRADIP Garcia MD as Consulting Physician (Cardiology)    Outpatient Medications Prior to Visit   Medication Sig Dispense Refill    cyanocobalamin (VITAMIN B-12) 1000 MCG tablet Take 1 tablet by mouth Daily. (Patient taking differently: Take 0.5 tablets by mouth Daily.)      ferrous sulfate 325 (65 FE) MG tablet Take 1 tablet by mouth Daily With Breakfast.      multivitamin with minerals tablet tablet Take 1 tablet by mouth Daily.      NON FORMULARY Apply  to the mouth or throat Daily. Tumeric, mag, vit d      Omega-3 Fatty Acids (fish oil) 1000 MG capsule capsule Take  by mouth Daily With Breakfast.      donepezil (ARICEPT) 5 MG tablet Take 1 tablet by mouth Every Night. 90 tablet 1    levothyroxine (SYNTHROID, LEVOTHROID) 25 MCG tablet TAKE ONE TABLET BY MOUTH EVERY MORNING ON AN EMPTY STOMACH WITH WATER ONLY AND WAIT 30-60 MINUTES BEFORE CONSUMING ANY OTHER LIQUIDS, FOOD OR MEDICATIONS 90 tablet 3    lisinopril-hydrochlorothiazide (PRINZIDE,ZESTORETIC) 20-25 MG per tablet TAKE ONE TABLET BY MOUTH ONCE A DAY 90 tablet 1    meloxicam (MOBIC) 7.5 MG tablet Take 1 tablet by mouth  "Daily. 90 tablet 3    gabapentin (NEURONTIN) 100 MG capsule Take 1 capsule by mouth Every 12 (Twelve) Hours. (Patient not taking: Reported on 11/18/2024) 60 capsule 2    magnesium oxide (MAG-OX) 400 MG tablet Take 1 tablet by mouth Daily. (Patient not taking: Reported on 11/18/2024)      NON FORMULARY AIRBORNE       No facility-administered medications prior to visit.     No opioid medication identified on active medication list. I have reviewed chart for other potential  high risk medication/s and harmful drug interactions in the elderly.      Aspirin is not on active medication list.  Aspirin use is not indicated based on review of current medical condition/s. Risk of harm outweighs potential benefits.  .    Patient Active Problem List   Diagnosis    Essential hypertension    Aortic stenosis, mild    Respiratory insufficiency    Bilateral lower extremity edema    Grade I diastolic dysfunction    Closed fracture of transverse process of thoracic vertebra    Cervical strain, acute, sequela    Cervical spondylosis without myelopathy    Arthropathy of hand    Benign prostatic hyperplasia    Chronic fatigue syndrome    Colon polyps    Hyperlipidemia    Hypothyroidism    Impotence of organic origin    Iron deficiency anemia    Osteoarthritis of left knee    Solitary pulmonary nodule    Staph infection    Vitamin D deficiency    Localized edema    Hematoma    Impaired fasting glucose     Advance Care Planning Advance Directive is not on file.  ACP discussion was held with the patient during this visit. Patient does not have an advance directive, declines further assistance.            Objective   Vitals:    11/18/24 1511   BP: 100/52   BP Location: Left arm   Patient Position: Sitting   Pulse: 62   Resp: 16   Temp: 97.8 °F (36.6 °C)   TempSrc: Temporal   SpO2: 96%   Weight: 73.8 kg (162 lb 12.8 oz)   Height: 177.8 cm (70\")   PainSc: 0-No pain       Estimated body mass index is 23.36 kg/m² as calculated from the " "following:    Height as of this encounter: 177.8 cm (70\").    Weight as of this encounter: 73.8 kg (162 lb 12.8 oz).    BMI is within normal parameters. No other follow-up for BMI required.       Does the patient have evidence of cognitive impairment? No  Lab Results   Component Value Date    TRIG 31 10/18/2024    HDL 60 10/18/2024    LDL 95 10/18/2024    VLDL 7 10/18/2024    HGBA1C 5.40 10/18/2024                                                                                                Health  Risk Assessment    Smoking Status:  Social History     Tobacco Use   Smoking Status Never   Smokeless Tobacco Never     Alcohol Consumption:  Social History     Substance and Sexual Activity   Alcohol Use Not Currently    Comment: Might have mixed drink monthly       Fall Risk Screen  STEADI Fall Risk Assessment was completed, and patient is at LOW risk for falls.Assessment completed on:2024    Depression Screening   Little interest or pleasure in doing things? Not at all   Feeling down, depressed, or hopeless? Not at all   PHQ-2 Total Score 0      Health Habits and Functional and Cognitive Screenin/18/2024     2:56 PM   Functional & Cognitive Status   Do you have difficulty preparing food and eating? No   Do you have difficulty bathing yourself, getting dressed or grooming yourself? No   Do you have difficulty using the toilet? No   Do you have difficulty moving around from place to place? No   Do you have trouble with steps or getting out of a bed or a chair? No   Current Diet Well Balanced Diet   Dental Exam Up to date   Eye Exam Up to date   Exercise (times per week) Other   Current Exercises Include Yard Work;Walking;Gardening;House Cleaning;Hiking   Do you need help using the phone?  No   Are you deaf or do you have serious difficulty hearing?  No   Do you need help to go to places out of walking distance? No   Do you need help shopping? No   Do you need help preparing meals?  No   Do you need help " with housework?  No   Do you need help with laundry? No   Do you need help taking your medications? No   Do you need help managing money? No   Do you ever drive or ride in a car without wearing a seat belt? No   Have you felt unusual stress, anger or loneliness in the last month? No   Who do you live with? Spouse   If you need help, do you have trouble finding someone available to you? No   Have you been bothered in the last four weeks by sexual problems? No   Do you have difficulty concentrating, remembering or making decisions? No           Age-appropriate Screening Schedule:  Refer to the list below for future screening recommendations based on patient's age, sex and/or medical conditions. Orders for these recommended tests are listed in the plan section. The patient has been provided with a written plan.    Health Maintenance List  Health Maintenance   Topic Date Due    RSV Vaccine - Adults (1 - 1-dose 75+ series) 11/18/2025 (Originally 2/5/2012)    PROSTATE CANCER SCREENING  10/18/2025    LIPID PANEL  10/18/2025    ANNUAL WELLNESS VISIT  11/18/2025    TDAP/TD VACCINES (3 - Td or Tdap) 08/27/2034    COVID-19 Vaccine  Completed    INFLUENZA VACCINE  Completed    Pneumococcal Vaccine 65+  Completed    ZOSTER VACCINE  Completed                                                                                                                                                CMS Preventative Services Quick Reference  Risk Factors Identified During Encounter  Immunizations Discussed/Encouraged: RSV (Respiratory Syncytial Virus)    The above risks/problems have been discussed with the patient.  Pertinent information has been shared with the patient in the After Visit Summary.  An After Visit Summary and PPPS were made available to the patient.    Follow Up:   Next Medicare Wellness visit to be scheduled in 1 year.          Additional E&M Note during same encounter follows:  Patient has multiple medical problems which are  significant and separately identifiable that require additional work above and beyond the Medicare Wellness Visit.      Chief Complaint  Medicare Wellness-subsequent (89 year old male here today for his annual medicare wellness)    Payam Owens is a 89 y.o. male who presents to [facility]   History of Present Illness  The patient is an 89-year-old male who presents for a Medicare wellness visit and a follow-up for high blood pressure, hypothyroidism, arthritis, short-term memory issues, history of anemia, hyperlipidemia, vitamin D deficiency, and impaired fasting glucose.    He has been managing prediabetes for an extended period. His recent hemoglobin A1c level was 5.4, indicating normal blood sugar levels. He has been adhering to [facility], which has been beneficial. He monitors his carbohydrate intake and has eliminated sweets from his diet.    For arthritis, he has discontinued glucosamine as it was not providing the desired relief. Instead, he takes magnesium citrate and calcium, which have effectively alleviated his joint pain. He has been on meloxicam 7.5 mg for approximately 4 to 5 years, significantly improving his wrist condition. He also incorporates neck exercises and stretching into his routine, which have been beneficial.    Regarding his short-term memory issues, he has been taking Aricept 5 mg daily, which has slightly improved his memory. He takes one pill at night along with his blood pressure medication.    For high blood pressure, he has been taking lisinopril hydrochlorothiazide 20-25 mg. He alternates between half a pill and a full pill daily. He experiences leg swelling, which is managed with diuretics and compression socks. He reports no shortness of breath or chest pain. He is under the care of a cardiologist, Dr. Garcia. Despite his low blood pressure, he does not experience fatigue or dizziness. He maintains an active lifestyle,  "including walking about a mile and engaging in manual labor for about an hour.    He has been taking thyroid medication, which has been effective in managing his condition.    Supplemental Information:  He has an enlarged prostate. He quit taking gabapentin because it made him feel run down. He is taking fish oil for his cholesterol.    IMMUNIZATIONS  He is up to date on his vaccines.    Objective   Vital Signs:   Vitals:    24 1511   BP: 100/52   BP Location: Left arm   Patient Position: Sitting   Pulse: 62   Resp: 16   Temp: 97.8 °F (36.6 °C)   TempSrc: Temporal   SpO2: 96%   Weight: 73.8 kg (162 lb 12.8 oz)   Height: 177.8 cm (70\")   PainSc: 0-No pain       Wt Readings from Last 3 Encounters:   24 73.8 kg (162 lb 12.8 oz)   24 75 kg (165 lb 6.4 oz)   24 73.8 kg (162 lb 12.8 oz)     BP Readings from Last 3 Encounters:   24 100/52   24 98/52   24 115/80       Physical Exam  Vitals reviewed.   Constitutional:       General: He is not in acute distress.  HENT:      Head: Normocephalic and atraumatic.   Eyes:      Conjunctiva/sclera: Conjunctivae normal.   Cardiovascular:      Rate and Rhythm: Normal rate and regular rhythm.      Heart sounds: Murmur heard.   Pulmonary:      Effort: Pulmonary effort is normal.      Breath sounds: Normal breath sounds. No wheezing, rhonchi or rales.   Abdominal:      General: There is no distension.      Palpations: Abdomen is soft. There is no mass.      Tenderness: There is no abdominal tenderness.   Musculoskeletal:      Right lower le+ Pitting Edema present.      Left lower le+ Pitting Edema present.   Lymphadenopathy:      Cervical: No cervical adenopathy.   Skin:     General: Skin is warm and dry.   Neurological:      General: No focal deficit present.      Mental Status: He is alert.   Psychiatric:         Mood and Affect: Mood normal.         Thought Content: Thought content normal.         The following data was reviewed by " Jamila Loco, APRN on 11/18/2024  Common Labs   Common labs          4/17/2024    10:33 10/18/2024    11:15   Common Labs   Glucose 105  92    BUN 45  29    Creatinine 1.42  1.31    Sodium 140  136    Potassium 4.6  4.2    Chloride 105  101    Calcium 9.1  9.1    Albumin 3.7  3.5    Total Bilirubin 0.3  0.3    Alkaline Phosphatase 96  93    AST (SGOT) 27  24    ALT (SGPT) 10  14    WBC 4.50  4.86    Hemoglobin 12.2  11.7    Hematocrit 36.3  35.9    Platelets 216  193    Total Cholesterol 156  162    Triglycerides 39  31    HDL Cholesterol 40  60    LDL Cholesterol  107  95    Hemoglobin A1C 5.80  5.40    PSA  2.220      Results  Laboratory Studies  Hemoglobin A1c is 5.4. Magnesium levels were high. Thyroid levels were good. Vitamin D levels were normal.      Assessment & Plan   Diagnoses and all orders for this visit:    1. Encounter for annual wellness exam in Medicare patient (Primary)    2. Essential hypertension  -     Comprehensive Metabolic Panel; Future    3. Prediabetes  -     Hemoglobin A1c; Future    4. Hypothyroidism, unspecified type  -     TSH+Free T4; Future  -     levothyroxine (SYNTHROID, LEVOTHROID) 25 MCG tablet; TAKE ONE TABLET BY MOUTH EVERY MORNING ON AN EMPTY STOMACH WITH WATER ONLY AND WAIT 30-60 MINUTES BEFORE CONSUMING ANY OTHER LIQUIDS, FOOD OR MEDICATIONS  Dispense: 90 tablet; Refill: 3    5. Osteoarthritis of left knee, unspecified osteoarthritis type    6. Poor short term memory    7. Anemia, unspecified type  -     CBC & Differential; Future  -     Folate; Future  -     Vitamin B12; Future  -     Iron; Future    8. Hyperlipidemia, unspecified hyperlipidemia type  -     Lipid Panel; Future    9. Vitamin D deficiency  -     Vitamin D,25-Hydroxy; Future    Other orders  -     donepezil (Aricept) 10 MG tablet; Take 1 tablet by mouth Every Night.  Dispense: 90 tablet; Refill: 3  -     lisinopril-hydrochlorothiazide (Zestoretic) 10-12.5 MG per tablet; Take 1 tablet by mouth Daily.   Dispense: 90 tablet; Refill: 3      Assessment & Plan  1. Prediabetes.  His hemoglobin A1c has returned to normal at 5.4, indicating effective management of his prediabetes. He has been following a diet plan [facility] and will start monitoring carbohydrate intake to maintain or further improve his A1c levels.    2. Arthritis.  His kidney function is compromised, likely due to the long-term use of meloxicam. He was advised to discontinue meloxicam and consider Tylenol Arthritis or acetaminophen for pain management. He has also found relief from joint pain through stretching exercises.    3. Short-term memory loss.  The dosage of Aricept will be increased from 5 mg to 10 mg. He can double his current 5 mg pills until the new prescription is available at [facility].    4. Hypertension.  His blood pressure is currently on the lower side at 100/52. The dosage of his blood pressure medication, lisinopril hydrochlorothiazide, will be reduced to 10-12.5 mg to avoid the need to break pills in half. He will monitor his blood pressure and adjust the dosage as needed.  Follow-up with cardiology.    5. Hypothyroidism.  His thyroid levels are within the normal range. He will continue taking his current thyroid medication, levothyroxine 25 mcg, in the morning on an empty stomach.    6. Health Maintenance.  He is up to date on his vaccines, including COVID-19, flu, pneumonia, and shingles vaccines. He needs to follow up to ensure he receives the second dose of the shingles vaccine.    BMI is within normal parameters. No other follow-up for BMI required.       FOLLOW UP  Return in about 1 year (around 11/18/2025) for Medicare Wellness.  Patient was given instructions and counseling regarding his condition or for health maintenance advice. Please see specific information pulled into the AVS if appropriate.     Patient or patient representative verbalized consent for the use of Ambient Listening during the visit with   CHAPO Lafleur for chart documentation. 11/18/2024  15:19 EST    CHAPO Lafleur  11/18/24  16:52 EST

## 2024-11-18 ENCOUNTER — OFFICE VISIT (OUTPATIENT)
Dept: INTERNAL MEDICINE | Age: 89
End: 2024-11-18
Payer: MEDICARE

## 2024-11-18 VITALS
BODY MASS INDEX: 23.31 KG/M2 | HEIGHT: 70 IN | HEART RATE: 62 BPM | SYSTOLIC BLOOD PRESSURE: 100 MMHG | DIASTOLIC BLOOD PRESSURE: 52 MMHG | WEIGHT: 162.8 LBS | OXYGEN SATURATION: 96 % | TEMPERATURE: 97.8 F | RESPIRATION RATE: 16 BRPM

## 2024-11-18 DIAGNOSIS — E55.9 VITAMIN D DEFICIENCY: ICD-10-CM

## 2024-11-18 DIAGNOSIS — E03.9 HYPOTHYROIDISM, UNSPECIFIED TYPE: Chronic | ICD-10-CM

## 2024-11-18 DIAGNOSIS — E78.5 HYPERLIPIDEMIA, UNSPECIFIED HYPERLIPIDEMIA TYPE: ICD-10-CM

## 2024-11-18 DIAGNOSIS — D64.9 ANEMIA, UNSPECIFIED TYPE: ICD-10-CM

## 2024-11-18 DIAGNOSIS — R73.03 PREDIABETES: ICD-10-CM

## 2024-11-18 DIAGNOSIS — R41.3 POOR SHORT TERM MEMORY: ICD-10-CM

## 2024-11-18 DIAGNOSIS — M17.12 OSTEOARTHRITIS OF LEFT KNEE, UNSPECIFIED OSTEOARTHRITIS TYPE: Chronic | ICD-10-CM

## 2024-11-18 DIAGNOSIS — I10 ESSENTIAL HYPERTENSION: Chronic | ICD-10-CM

## 2024-11-18 DIAGNOSIS — Z00.00 ENCOUNTER FOR ANNUAL WELLNESS EXAM IN MEDICARE PATIENT: Primary | ICD-10-CM

## 2024-11-18 PROBLEM — R73.01 IMPAIRED FASTING GLUCOSE: Status: ACTIVE | Noted: 2024-11-18

## 2024-11-18 PROCEDURE — 1126F AMNT PAIN NOTED NONE PRSNT: CPT | Performed by: NURSE PRACTITIONER

## 2024-11-18 PROCEDURE — G0439 PPPS, SUBSEQ VISIT: HCPCS | Performed by: NURSE PRACTITIONER

## 2024-11-18 PROCEDURE — 1170F FXNL STATUS ASSESSED: CPT | Performed by: NURSE PRACTITIONER

## 2024-11-18 PROCEDURE — 99214 OFFICE O/P EST MOD 30 MIN: CPT | Performed by: NURSE PRACTITIONER

## 2024-11-18 PROCEDURE — 1159F MED LIST DOCD IN RCRD: CPT | Performed by: NURSE PRACTITIONER

## 2024-11-18 PROCEDURE — 1160F RVW MEDS BY RX/DR IN RCRD: CPT | Performed by: NURSE PRACTITIONER

## 2024-11-18 RX ORDER — LISINOPRIL AND HYDROCHLOROTHIAZIDE 10; 12.5 MG/1; MG/1
1 TABLET ORAL DAILY
Qty: 90 TABLET | Refills: 3 | Status: SHIPPED | OUTPATIENT
Start: 2024-11-18

## 2024-11-18 RX ORDER — LEVOTHYROXINE SODIUM 25 UG/1
TABLET ORAL
Qty: 90 TABLET | Refills: 3 | Status: SHIPPED | OUTPATIENT
Start: 2024-11-18

## 2024-11-18 RX ORDER — DONEPEZIL HYDROCHLORIDE 10 MG/1
10 TABLET, FILM COATED ORAL NIGHTLY
Qty: 90 TABLET | Refills: 3 | Status: SHIPPED | OUTPATIENT
Start: 2024-11-18

## 2025-01-30 ENCOUNTER — TELEPHONE (OUTPATIENT)
Dept: ORTHOPEDIC SURGERY | Facility: CLINIC | Age: 89
End: 2025-01-30
Payer: MEDICARE

## 2025-01-30 ENCOUNTER — TELEPHONE (OUTPATIENT)
Dept: ORTHOPEDIC SURGERY | Facility: CLINIC | Age: 89
End: 2025-01-30

## 2025-01-30 NOTE — TELEPHONE ENCOUNTER
LT VM FOR PT TO CALL TO MAKE AN APT FOR HIS LEFT KNEE SYNVISC INJ. OK FOR HUB TO SCHEDULE FOR NEXT AVAILABILITY

## 2025-01-30 NOTE — TELEPHONE ENCOUNTER
Caller: CLAUDIA VALLES    Relationship to patient: Emergency Contact    Best call back number: [phone] (home)       Chief complaint: LEFT KNEE    Type of visit: SYNVISC INJECTION    Requested date: NA      If rescheduling, when is the original appointment:  NA     Additional notes:LEFT KNEE- NO RECENT IMAGING- NO SX - REQUESTING INJECTION- LAST GIVEN SYNVISC

## 2025-01-30 NOTE — TELEPHONE ENCOUNTER
Hub staff attempted to follow warm transfer process and was unsuccessful     Caller: BOBY    Relationship to patient: SELF    Best call back number: [phone]    Patient is needing: RETURNING CALL TO SCHEDULE APPT- PLEASE CALL-

## 2025-02-08 PROCEDURE — 87635 SARS-COV-2 COVID-19 AMP PRB: CPT | Performed by: PHYSICIAN ASSISTANT

## 2025-02-13 ENCOUNTER — HOSPITAL ENCOUNTER (OUTPATIENT)
Dept: GENERAL RADIOLOGY | Facility: HOSPITAL | Age: 89
Discharge: HOME | End: 2025-02-13
Payer: MEDICARE

## 2025-02-13 ENCOUNTER — OFFICE VISIT (OUTPATIENT)
Dept: INTERNAL MEDICINE | Age: 89
End: 2025-02-13
Payer: MEDICARE

## 2025-02-13 ENCOUNTER — TELEPHONE (OUTPATIENT)
Dept: INTERNAL MEDICINE | Age: 89
End: 2025-02-13
Payer: MEDICARE

## 2025-02-13 VITALS
HEART RATE: 70 BPM | TEMPERATURE: 98.8 F | SYSTOLIC BLOOD PRESSURE: 118 MMHG | DIASTOLIC BLOOD PRESSURE: 70 MMHG | OXYGEN SATURATION: 95 % | WEIGHT: 168.6 LBS | RESPIRATION RATE: 18 BRPM | BODY MASS INDEX: 25.55 KG/M2 | HEIGHT: 68 IN

## 2025-02-13 DIAGNOSIS — J22 LOWER RESPIRATORY INFECTION (E.G., BRONCHITIS, PNEUMONIA, PNEUMONITIS, PULMONITIS): ICD-10-CM

## 2025-02-13 DIAGNOSIS — J22 LOWER RESPIRATORY INFECTION (E.G., BRONCHITIS, PNEUMONIA, PNEUMONITIS, PULMONITIS): Primary | ICD-10-CM

## 2025-02-13 PROCEDURE — G2211 COMPLEX E/M VISIT ADD ON: HCPCS

## 2025-02-13 PROCEDURE — 1159F MED LIST DOCD IN RCRD: CPT

## 2025-02-13 PROCEDURE — 1160F RVW MEDS BY RX/DR IN RCRD: CPT

## 2025-02-13 PROCEDURE — 1126F AMNT PAIN NOTED NONE PRSNT: CPT

## 2025-02-13 PROCEDURE — 99214 OFFICE O/P EST MOD 30 MIN: CPT

## 2025-02-13 PROCEDURE — 71046 X-RAY EXAM CHEST 2 VIEWS: CPT

## 2025-02-13 RX ORDER — ALBUTEROL SULFATE 90 UG/1
2 INHALANT RESPIRATORY (INHALATION) EVERY 4 HOURS PRN
Qty: 18 G | Refills: 1 | Status: SHIPPED | OUTPATIENT
Start: 2025-02-13 | End: 2025-02-13 | Stop reason: SDUPTHER

## 2025-02-13 RX ORDER — AZITHROMYCIN 250 MG/1
TABLET, FILM COATED ORAL
Qty: 6 TABLET | Refills: 0 | Status: SHIPPED | OUTPATIENT
Start: 2025-02-13

## 2025-02-13 RX ORDER — ALBUTEROL SULFATE 90 UG/1
2 INHALANT RESPIRATORY (INHALATION) EVERY 4 HOURS PRN
Qty: 18 G | Refills: 1 | Status: SHIPPED | OUTPATIENT
Start: 2025-02-13

## 2025-02-13 RX ORDER — PREDNISONE 20 MG/1
20 TABLET ORAL 2 TIMES DAILY
Qty: 10 TABLET | Refills: 0 | Status: SHIPPED | OUTPATIENT
Start: 2025-02-13 | End: 2025-02-18

## 2025-02-13 RX ORDER — AZITHROMYCIN 250 MG/1
TABLET, FILM COATED ORAL
Qty: 6 TABLET | Refills: 0 | Status: SHIPPED | OUTPATIENT
Start: 2025-02-13 | End: 2025-02-13 | Stop reason: SDUPTHER

## 2025-02-13 RX ORDER — PREDNISONE 20 MG/1
20 TABLET ORAL 2 TIMES DAILY
Qty: 10 TABLET | Refills: 0 | Status: SHIPPED | OUTPATIENT
Start: 2025-02-13 | End: 2025-02-13 | Stop reason: SDUPTHER

## 2025-02-13 NOTE — PROGRESS NOTES
Chief Complaint  Cough (89 year old male here today complaining of cough for over a week. /He was seen at Urgent Care last week. States his symptoms are getting better but not 100%. States he has to be careful since his wife has Cancer. )    History of Present Illness  SUBJECTIVE  Payam Owens presents to [facility]     History of Present Illness  The patient presents for evaluation of a cough.  Was seen at urgent care Saturday tested negative for flu, COVID. Given tessalon pearls.   He reports a persistent cough that has been present since Saturday. He describes the cough as tight and loose, but without any expectoration. If he spits, it is clear. He also experiences significant sinus congestion and head pressure. He had a low-grade fever, with temperatures slightly above 100 degrees, for three consecutive days last week, from Sunday to Tuesday. He has not been monitoring his temperature since then. He reports no shortness of breath, chest pain, or palpitations. He has not previously used an inhaler and is not currently on any respiratory medications. He has been exposed to sick individuals, including his daughter and two acquaintances with whom he played golf last Wednesday. He has a pulse oximeter at home to monitor his oxygen levels. He has a history of respiratory insufficiency and pulmonary nodule. He has no history of COPD. He was prescribed Tessalon Perles at an urgent care facility, which he reports as beneficial in managing his cough.            Past Medical History:   Diagnosis Date    Arthritis     Back problem     Back Decompression 1990    Breathing problem     patient had breathing problem 2 years ago    Chronic fatigue     Edema     Enlarged prostate     Essential hypertension 06/08/2020    History of echocardiogram 05/29/2020    EF = 66.0%, borderline concentric hypertrophy, diastolic dysfunction (grade I), moderate calcification of the aortic valve, moderate  calcification of the aortic valve.Trace-to-mild aortic valve regurgitation, mild to moderate aortic stenosis, Mild MR/TR/TN    HL (hearing loss)     Hearing not a problem    Hyperlipidemia, unspecified     Hypothyroidism, unspecified     Iron deficiency anemia, unspecified     Lung nodule     Neck injury     Prostate disorder     tuna of prostate Dr. Torre [id number]    Unilateral primary osteoarthritis, left knee     Vitamin D deficiency, unspecified       Family History   Problem Relation Age of Onset    Heart failure Mother     Breast cancer Mother     Heart disease Mother         Congestive heart failure    Other Mother         Death 89-congestive heart failure    Dementia Father     Other Father         89 at death listed as old age    COPD Brother     Other Brother         56 years old  of emphysema      Past Surgical History:   Procedure Laterality Date    BACK SURGERY      back fusion     CARDIAC CATHETERIZATION N/A 2020    Procedure: Right and Left Heart Cath;  Surgeon: Collin Harvey MD;  Location:  [facility];  Service: Cardiology;  Laterality: N/A;    CARDIAC CATHETERIZATION N/A 2020    Procedure: Left ventriculography;  Surgeon: Collin Harvey MD;  Location:  [facility];  Service: Cardiology;  Laterality: N/A;    CARDIAC CATHETERIZATION N/A 2020    Procedure: Coronary angiography;  Surgeon: Collin Harvey MD;  Location:  [facility];  Service: Cardiology;  Laterality: N/A;    COLONOSCOPY  2018    EYE SURGERY      Cataracts both eyes    PROSTATE SURGERY          Current Outpatient Medications:     benzonatate (TESSALON) 200 MG capsule, Take 1 capsule by mouth 3 (Three) Times a Day As Needed for Cough for up to 30 doses., Disp: 30 capsule, Rfl: 0    cyanocobalamin (VITAMIN B-12) 1000 MCG tablet, Take 1 tablet by mouth Daily. (Patient taking differently: Take 0.5 tablets by mouth Daily.), Disp: , Rfl:      "donepezil (Aricept) 10 MG tablet, Take 1 tablet by mouth Every Night., Disp: 90 tablet, Rfl: 3    ferrous sulfate 325 (65 FE) MG tablet, Take 1 tablet by mouth Daily With Breakfast., Disp: , Rfl:     lisinopril-hydrochlorothiazide (PRINZIDE,ZESTORETIC) 20-25 MG per tablet, Take 1 tablet by mouth Daily., Disp: , Rfl:     multivitamin with minerals tablet tablet, Take 1 tablet by mouth Daily., Disp: , Rfl:     NON FORMULARY, Apply  to the mouth or throat Daily. Tumeric, mag, vit d, Disp: , Rfl:     Omega-3 Fatty Acids (fish oil) 1000 MG capsule capsule, Take  by mouth Daily With Breakfast., Disp: , Rfl:     albuterol sulfate  (90 Base) MCG/ACT inhaler, Inhale 2 puffs Every 4 (Four) Hours As Needed for Shortness of Air or Wheezing., Disp: 18 g, Rfl: 1    azithromycin (Zithromax Z-Mikel) 250 MG tablet, Take 2 tablets by mouth on day 1, then 1 tablet daily on days 2-5, Disp: 6 tablet, Rfl: 0    predniSONE (DELTASONE) 20 MG tablet, Take 1 tablet by mouth 2 (Two) Times a Day for 5 days., Disp: 10 tablet, Rfl: 0    OBJECTIVE  Vital Signs:   /70 (BP Location: Left arm, Patient Position: Sitting)   Pulse 70   Temp 98.8 °F (37.1 °C) (Temporal)   Resp 18   Ht 172.7 cm (68\")   Wt 76.5 kg (168 lb 9.6 oz)   SpO2 95%   BMI 25.64 kg/m²    Estimated body mass index is 25.64 kg/m² as calculated from the following:    Height as of this encounter: 172.7 cm (68\").    Weight as of this encounter: 76.5 kg (168 lb 9.6 oz).     Wt Readings from Last 3 Encounters:   02/13/25 76.5 kg (168 lb 9.6 oz)   02/08/25 78.3 kg (172 lb 9.6 oz)   11/18/24 73.8 kg (162 lb 12.8 oz)     BP Readings from Last 3 Encounters:   02/13/25 118/70   02/08/25 137/67   11/18/24 100/52       Physical Exam  Vitals and nursing note reviewed.   Constitutional:       Appearance: Normal appearance.   HENT:      Head: Normocephalic and atraumatic.      Right Ear: Tympanic membrane normal.      Left Ear: Tympanic membrane normal.   Eyes:      Extraocular " Movements: Extraocular movements intact.      Conjunctiva/sclera: Conjunctivae normal.   Cardiovascular:      Rate and Rhythm: Normal rate and regular rhythm.      Heart sounds: Normal heart sounds.   Pulmonary:      Effort: Pulmonary effort is normal.      Breath sounds: Wheezing and rhonchi present.   Abdominal:      General: Abdomen is flat. Bowel sounds are normal. There is no distension.      Palpations: Abdomen is soft. There is no mass.      Tenderness: There is no abdominal tenderness. There is no right CVA tenderness, left CVA tenderness, guarding or rebound.      Hernia: No hernia is present.   Musculoskeletal:         General: Normal range of motion.      Cervical back: Normal range of motion.   Skin:     General: Skin is warm and dry.   Neurological:      General: No focal deficit present.      Mental Status: He is alert and oriented to person, place, and time. Mental status is at baseline.   Psychiatric:         Mood and Affect: Mood normal.         Behavior: Behavior normal.         Thought Content: Thought content normal.         Judgment: Judgment normal.          Result Review        XR Chest 2 View    Result Date: 2/8/2025  Impression: No acute cardiopulmonary abnormality. Electronically Signed: Fatemeh Muñoz MD  2/8/2025 12:46 PM EST  Workstation ID: [id number]       The above data has been reviewed by CHAPO Whitten 02/13/2025 12:12 EST.          Patient Care Team:  Jamila Loco APRN as PCP - General (Nurse Practitioner)  Marlo Gamble MD as Surgeon (Neurosurgery)  Milton De Jesus MD as Consulting Physician (General Surgery)  PRADIP Garcia MD as Consulting Physician (Cardiology)    BMI is >= 25 and <30. (Overweight) The following options were offered after discussion;: exercise counseling/recommendations and nutrition counseling/recommendations       ASSESSMENT & PLAN    Diagnoses and all orders for this visit:    1. Lower respiratory infection (e.g., bronchitis, pneumonia,  pneumonitis, pulmonitis) (Primary)  -     XR Chest PA & Lateral; Future    Other orders  -     azithromycin (Zithromax Z-Mikel) 250 MG tablet; Take 2 tablets by mouth on day 1, then 1 tablet daily on days 2-5  Dispense: 6 tablet; Refill: 0  -     predniSONE (DELTASONE) 20 MG tablet; Take 1 tablet by mouth 2 (Two) Times a Day for 5 days.  Dispense: 10 tablet; Refill: 0  -     albuterol sulfate  (90 Base) MCG/ACT inhaler; Inhale 2 puffs Every 4 (Four) Hours As Needed for Shortness of Air or Wheezing.  Dispense: 18 g; Refill: 1         Assessment & Plan  1. Bronchitis.  He reports a persistent cough with clear sputum, sinus congestion, and a history of low-grade fever. His lungs sound congested upon examination. He was previously given Tessalon Perles, which helped with the cough. He may have had influenza, but it is now getting down into his lungs. A chest x-ray has been ordered to rule out pneumonia. He will be started on an antibiotic regimen, prednisone, and an albuterol inhaler. A sample of BReztri will be provided today. He is advised to use the albuterol inhaler as needed for shortness of breath and the Trelegy inhaler twice daily, rinsing his mouth after each use. He should monitor his oxygen levels at home and seek immediate medical attention if they drop below 90.    Follow-up  The patient will follow up on Monday.      Tobacco Use: Low Risk  (2/13/2025)    Patient History     Smoking Tobacco Use: Never     Smokeless Tobacco Use: Never     Passive Exposure: Past       Follow Up     Return in about 4 days (around 2/17/2025).    Please note that portions of this note were completed with a voice recognition program.    Patient was given instructions and counseling regarding his condition or for health maintenance advice. Please see specific information pulled into the AVS if appropriate.   I have reviewed information obtained and documented by others and I have confirmed the accuracy of this documented  note.    CHAPO Whitten    Patient or patient representative verbalized consent for the use of Ambient Listening during the visit with  CHAPO Whitten for chart documentation. 2/13/2025  12:25 EST

## 2025-02-13 NOTE — TELEPHONE ENCOUNTER
Pharmacy Name: [facility] [address] [phone] [facility] [phone]      Pharmacy representative name: PETE    Pharmacy representative phone number: [phone]    What medication are you calling in regards to: ZPACK AND DONEPEZIL     What question does the pharmacy have: THERE IS A DRUG INTERACTION BETWEEN THESE TWO MEDICATIONS. THEY WOULD LIKE TO HAVE IT CHANGED IF POSSIBLE     Who is the provider that prescribed the medication: MILLIE

## 2025-02-14 ENCOUNTER — OFFICE VISIT (OUTPATIENT)
Dept: CARDIOLOGY | Facility: CLINIC | Age: 89
End: 2025-02-14
Payer: MEDICARE

## 2025-02-14 VITALS
BODY MASS INDEX: 25.14 KG/M2 | SYSTOLIC BLOOD PRESSURE: 161 MMHG | WEIGHT: 165.9 LBS | DIASTOLIC BLOOD PRESSURE: 88 MMHG | HEART RATE: 61 BPM | HEIGHT: 68 IN

## 2025-02-14 DIAGNOSIS — R60.0 BILATERAL LOWER EXTREMITY EDEMA: Primary | ICD-10-CM

## 2025-02-14 DIAGNOSIS — I10 ESSENTIAL HYPERTENSION: ICD-10-CM

## 2025-02-14 DIAGNOSIS — I35.1 TRACE AORTIC VALVE REGURGITATION: ICD-10-CM

## 2025-02-14 PROCEDURE — 1160F RVW MEDS BY RX/DR IN RCRD: CPT | Performed by: INTERNAL MEDICINE

## 2025-02-14 PROCEDURE — 1159F MED LIST DOCD IN RCRD: CPT | Performed by: INTERNAL MEDICINE

## 2025-02-14 PROCEDURE — 99214 OFFICE O/P EST MOD 30 MIN: CPT | Performed by: INTERNAL MEDICINE

## 2025-02-14 NOTE — TELEPHONE ENCOUNTER
"Relay     \"I meant to call the pharmacy and called the pt in error. Please disregard or read prior message. \"                "

## 2025-02-14 NOTE — TELEPHONE ENCOUNTER
Caller: Payam Owens    Relationship to patient: Self    Best call back number: [phone]    Patient is needing: PATIENT STATES HE MISSED A CALL FROM THE OFFICE AND WAS RETURNING THIS. PATIENT NOT FOR SURE WHO GAVE HIM A CALL OR WHAT IT WAS PERTAINING TOO.

## 2025-02-14 NOTE — PROGRESS NOTES
Chief Complaint  Follow-up    Subjective            Payam Owens presents to [facility]    Mr. Owens is here for follow-up evaluation management of essential hypertension, lower extremity edema, trace aortic regurgitation.  Currently has bronchitis but has otherwise been doing well.  He exercises regularly.  He denies chest pain, palpitations or excessive shortness of breath.    PMH  Past Medical History:   Diagnosis Date    Arthritis     Back problem     Back Decompression 1990    Breathing problem     patient had breathing problem 2 years ago    Chronic fatigue     Edema     Enlarged prostate     Essential hypertension 06/08/2020    History of echocardiogram 05/29/2020    EF = 66.0%, borderline concentric hypertrophy, diastolic dysfunction (grade I), moderate calcification of the aortic valve, moderate calcification of the aortic valve.Trace-to-mild aortic valve regurgitation, mild to moderate aortic stenosis, Mild MR/TR/ID    HL (hearing loss) 2000    Hearing not a problem    Hyperlipidemia, unspecified     Hypothyroidism, unspecified     Iron deficiency anemia, unspecified     Lung nodule     Neck injury 2021    Prostate disorder     tuna of prostate Dr. Torre [id number]    Unilateral primary osteoarthritis, left knee     Vitamin D deficiency, unspecified          SURGICALHX  Past Surgical History:   Procedure Laterality Date    BACK SURGERY      back fusion 1992    CARDIAC CATHETERIZATION N/A 07/21/2020    Procedure: Right and Left Heart Cath;  Surgeon: Collin Harvey MD;  Location:  [facility];  Service: Cardiology;  Laterality: N/A;    CARDIAC CATHETERIZATION N/A 07/21/2020    Procedure: Left ventriculography;  Surgeon: Collin Harvey MD;  Location:  [facility];  Service: Cardiology;  Laterality: N/A;    CARDIAC CATHETERIZATION N/A 07/21/2020    Procedure: Coronary angiography;  Surgeon: Collin Harvey MD;  Location:  [facility];  Service: Cardiology;  Laterality: N/A;    COLONOSCOPY  2018    EYE SURGERY      Cataracts both eyes    PROSTATE SURGERY          SOC  Social History     Socioeconomic History    Marital status:    Tobacco Use    Smoking status: Never     Passive exposure: Past    Smokeless tobacco: Never   Vaping Use    Vaping status: Never Used   Substance and Sexual Activity    Alcohol use: Not Currently     Comment: Might have mixed drink monthly    Drug use: Never    Sexual activity: Yes     Partners: Female     Birth control/protection: Other, None     Comment: Natural sex         FAMHX  Family History   Problem Relation Age of Onset    Heart failure Mother     Breast cancer Mother     Heart disease Mother         Congestive heart failure    Other Mother         Death 89-congestive heart failure    Dementia Father     Other Father         89 at death listed as old age    COPD Brother     Other Brother         56 years old  of emphysema          ALLERGY  No Known Allergies     MEDSCURRENT    Current Outpatient Medications:     albuterol sulfate  (90 Base) MCG/ACT inhaler, Inhale 2 puffs Every 4 (Four) Hours As Needed for Shortness of Air or Wheezing., Disp: 18 g, Rfl: 1    azithromycin (Zithromax Z-Mikel) 250 MG tablet, Take 2 tablets by mouth on day 1, then 1 tablet daily on days 2-5, Disp: 6 tablet, Rfl: 0    benzonatate (TESSALON) 200 MG capsule, Take 1 capsule by mouth 3 (Three) Times a Day As Needed for Cough for up to 30 doses., Disp: 30 capsule, Rfl: 0    cyanocobalamin (VITAMIN B-12) 1000 MCG tablet, Take 1 tablet by mouth Daily. (Patient taking differently: Take 0.5 tablets by mouth Daily.), Disp: , Rfl:     donepezil (Aricept) 10 MG tablet, Take 1 tablet by mouth Every Night., Disp: 90 tablet, Rfl: 3    ferrous sulfate 325 (65 FE) MG tablet, Take 1 tablet by mouth Daily With Breakfast., Disp: , Rfl:     lisinopril-hydrochlorothiazide (PRINZIDE,ZESTORETIC) 20-25 MG per tablet, Take 1  "tablet by mouth Daily., Disp: , Rfl:     multivitamin with minerals tablet tablet, Take 1 tablet by mouth Daily., Disp: , Rfl:     NON FORMULARY, Apply  to the mouth or throat Daily. Tumeric, mag, vit d, Disp: , Rfl:     Omega-3 Fatty Acids (fish oil) 1000 MG capsule capsule, Take  by mouth Daily With Breakfast., Disp: , Rfl:     predniSONE (DELTASONE) 20 MG tablet, Take 1 tablet by mouth 2 (Two) Times a Day for 5 days., Disp: 10 tablet, Rfl: 0      Review of Systems   Cardiovascular:  Negative for chest pain, dyspnea on exertion and palpitations.        Objective     /88 (BP Location: Left arm, Patient Position: Sitting, Cuff Size: Adult)   Pulse 61   Ht 172.7 cm (68\")   Wt 75.3 kg (165 lb 14.4 oz)   BMI 25.23 kg/m²       General Appearance:   well developed  well nourished  HENT:   oropharynx moist  lips not cyanotic  Neck:  thyroid not enlarged  supple  Respiratory:  no respiratory distress  Bilateral wheezing  no rales  Cardiovascular:  no jugular venous distention  regular rhythm  apical impulse normal  S1 normal, S2 normal  no S3, no S4   no murmur  no rub, no thrill  carotid pulses normal; no bruit  pedal pulses normal  lower extremity edema: none    Musculoskeletal:  no clubbing of fingers.   normocephalic, head atraumatic  Skin:   warm, dry  Psychiatric:  judgement and insight appropriate  normal mood and affect      Result Review :     The following data was reviewed by: Marek Garcia MD on 02/14/2024:    CMP          4/17/2024    10:33 10/18/2024    11:15   CMP   Glucose 105  92    BUN 45  29    Creatinine 1.42  1.31    EGFR 47.8  52.7    Sodium 140  136    Potassium 4.6  4.2    Chloride 105  101    Calcium 9.1  9.1    Total Protein 6.2  6.3    Albumin 3.7  3.5    Globulin 2.5  2.8    Total Bilirubin 0.3  0.3    Alkaline Phosphatase 96  93    AST (SGOT) 27  24    ALT (SGPT) 10  14    Albumin/Globulin Ratio 1.5  1.3    BUN/Creatinine Ratio 31.7  22.1    Anion Gap 8.7  11.3      CBC  "         4/17/2024    10:33 10/18/2024    11:15   CBC   WBC 4.50  4.86    RBC 3.94  3.76    Hemoglobin 12.2  11.7    Hematocrit 36.3  35.9    MCV 92.1  95.5    MCH 31.0  31.1    MCHC 33.6  32.6    RDW 12.2  12.0    Platelets 216  193      Lipid Panel          4/17/2024    10:33 10/18/2024    11:15   Lipid Panel   Total Cholesterol 156  162    Triglycerides 39  31    HDL Cholesterol 40  60    VLDL Cholesterol 9  7    LDL Cholesterol  107  95    LDL/HDL Ratio 2.71  1.60      TSH          4/17/2024    10:33 10/18/2024    11:15   TSH   TSH 3.960  2.510        Data reviewed : Primary care records reviewed      Procedures           Assessment and Plan        ASSESSMENT:  Encounter Diagnoses   Name Primary?    Bilateral lower extremity edema Yes    Essential hypertension     Trace aortic valve regurgitation          PLAN:    1.  Bilateral lower extremity edema-stable, continue compression and low-dose thiazide diuretic  2.  Essential hypertension-Home readings have been controlled, blood pressure yesterday in primary care was 118/70.  Continue to monitor, no changes needed in medication today  3.  Trace aortic regurgitation-no additional workup needed at this time is asymptomatic.  Follow clinically  4.  Acute bronchitis-starting steroids and antibiotics per primary care    Annual follow-up will be arranged      Patient was given instructions and counseling regarding his condition or for health maintenance advice. Please see specific information pulled into the AVS if appropriate.             PRADIP Garcia MD  2/14/2025    12:42 EST

## 2025-02-17 ENCOUNTER — TELEPHONE (OUTPATIENT)
Dept: INTERNAL MEDICINE | Age: 89
End: 2025-02-17
Payer: MEDICARE

## 2025-02-17 NOTE — PROGRESS NOTES
Can we please call and check in on patient and see how he is feeling. There is a possible infiltrate which could indicate mild pneumonia on his chest xray. However, this was done a few days ago and he is already on abx and steroids

## 2025-02-17 NOTE — TELEPHONE ENCOUNTER
HUB may relay message to patient.    ----- Message from Kathy Collier sent at 2/17/2025  9:33 AM EST -----  Can we please call and check in on patient and see how he is feeling. There is a possible infiltrate which could indicate mild pneumonia on his chest xray. However, this was done a few days ago and he is already on abx and steroids

## 2025-02-19 ENCOUNTER — OFFICE VISIT (OUTPATIENT)
Dept: ORTHOPEDIC SURGERY | Facility: CLINIC | Age: 89
End: 2025-02-19
Payer: MEDICARE

## 2025-02-19 VITALS — BODY MASS INDEX: 25.01 KG/M2 | HEIGHT: 68 IN | WEIGHT: 165 LBS

## 2025-02-19 DIAGNOSIS — M17.12 PRIMARY OSTEOARTHRITIS OF LEFT KNEE: Primary | ICD-10-CM

## 2025-02-19 NOTE — PROGRESS NOTES
Chief Complaint  Follow-up of the Left Knee     Subjective      Payam Owens presents to [facility] for follow up of the left knee. He has had pain in the left knee for awhile. He has treated his arthritis conservatively with injections.  His last injection  was 7/24/23.  He has pain with prolonged standing, ambulation and stairs the last couple of months on the medial aspect of the knee.  He has had no recent injury or fall.     No Known Allergies     Social History     Socioeconomic History    Marital status:    Tobacco Use    Smoking status: Never     Passive exposure: Past    Smokeless tobacco: Never   Vaping Use    Vaping status: Never Used   Substance and Sexual Activity    Alcohol use: Not Currently     Comment: Might have mixed drink monthly    Drug use: Never    Sexual activity: Yes     Partners: Female     Birth control/protection: Other, None     Comment: Natural sex        I reviewed the patient's chief complaint, history of present illness, review of systems, past medical history, surgical history, family history, social history, medications, and allergy list.     Review of Systems     Constitutional: Denies fevers, chills, weight loss  Cardiovascular: Denies chest pain, shortness of breath  Skin: Denies rashes, acute skin changes  Neurologic: Denies headache, loss of consciousness      Vital Signs:   There were no vitals taken for this visit.         Physical Exam  General: Alert. No acute distress    Ortho Exam        LEFT KNEE Flexion 110. Extension 0. Stable to varus/valgus stress. Stable to anterior/posterior drawer. Neurovascularly intact. Negative Marcello. Negative Lachman. Positive EHL, FHL, HS and TA. Sensation intact to light touch all 5 nerves of the foot. Ambulates with Antalgic gait. Patella is well tracking. Calf supple, non-tender. Positive tenderness to the medial joint line. Positive tenderness to the lateral joint line. Positive Crepitus. Good  strength to hamstrings, quadriceps, dorsiflexors, and plantar flexors.  Knee Extensor Mechanism intact       Large Joint Arthrocentesis: L knee  Date/Time: 2/19/2025 1:02 PM  Consent given by: patient  Site marked: site marked  Timeout: Immediately prior to procedure a time out was called to verify the correct patient, procedure, equipment, support staff and site/side marked as required   Supporting Documentation  Indications: pain   Procedure Details  Location: knee - L knee  Preparation: Patient was prepped and draped in the usual sterile fashion  Needle gauge: 21 G.  Medications administered: 48 mg hylan 48 MG/6ML  Patient tolerance: patient tolerated the procedure well with no immediate complications    This injection documentation was Scribed for Russell Fernandez MD by Annalee Madera.  02/19/25   13:02 EST        Imaging Results (Most Recent)       None             Result Review :           Assessment and Plan     Diagnoses and all orders for this visit:    1. Primary osteoarthritis of left knee (Primary)        Discussed the treatment plan with the patient.     Discussed the risks and benefits of conservative measures. The patient expressed understanding and wished to proceed with a left knee Synvisc injection.  He tolerated the injection well.      Discussed possibility of a reaction from the injection.  Discussed the possibility that the injection may not completely improve or remove the pain.  Discussed the risk of infection.    Patient advised on duration between injections. Discussed other injections in the future, if little/no relief. Consideration for Zilretta or steroid injection if continued pain in 3 months.         Follow-up as needed.       Call or return if worsening symptoms.    Follow Up     PRN      Patient was given instructions and counseling regarding his condition or for health maintenance advice. Please see specific information pulled into the AVS if appropriate.     Scribed for  Russell Fernandez MD by Lizbeth Alford MA.  02/19/25   12:33 EST    I have personally performed the services described in this document as scribed by the above individual and it is both accurate and complete. Russell Fernandez MD 02/19/25

## 2025-02-20 NOTE — PROGRESS NOTES
Chief Complaint  Follow-up (The patient is coming in to follow up on respiratory issues. He would like lungs listened to, he states about a week ago he had a touch of pneumonia. )    Subjective      History of Present Illness  The patient is an 89-year-old male who presents for evaluation of cough.    He sought medical attention due to a persistent dry, hacking cough, which was more severe than any previous episodes. A chest x-ray was performed, and he was informed of a potential influenza infection, although the diagnosis was not definitive. He was prescribed medication for his cough but did not receive any antiviral treatment. He has since completed a course of Z-Mikel and prednisone, resulting in significant improvement. His cough has largely resolved, and he no longer requires the use of inhalers.    Supplemental Information  He received a gel injection in his knee from Dr. Fernandez 2 days ago, and it is feeling better.      Past Medical History:   Diagnosis Date    Arthritis     Arthritis of neck     Back problem     Back Decompression 1990    Breathing problem     patient had breathing problem 2 years ago    Chronic fatigue     Edema     Enlarged prostate     Essential hypertension 06/08/2020    History of echocardiogram 05/29/2020    EF = 66.0%, borderline concentric hypertrophy, diastolic dysfunction (grade I), moderate calcification of the aortic valve, moderate calcification of the aortic valve.Trace-to-mild aortic valve regurgitation, mild to moderate aortic stenosis, Mild MR/TR/AK    HL (hearing loss) 2000    Hearing not a problem    Hyperlipidemia, unspecified     Hypothyroidism, unspecified     Iron deficiency anemia, unspecified     Lung nodule     Neck injury 2021    Prostate disorder     tuna of prostate Dr. Torre [id number]    Unilateral primary osteoarthritis, left knee     Vitamin D deficiency, unspecified         Past Surgical History:   Procedure Laterality Date    BACK SURGERY      back fusion 1992     CARDIAC CATHETERIZATION N/A 07/21/2020    Procedure: Right and Left Heart Cath;  Surgeon: Collin Harvey MD;  Location:  [facility];  Service: Cardiology;  Laterality: N/A;    CARDIAC CATHETERIZATION N/A 07/21/2020    Procedure: Left ventriculography;  Surgeon: Collin Harvey MD;  Location:  [facility];  Service: Cardiology;  Laterality: N/A;    CARDIAC CATHETERIZATION N/A 07/21/2020    Procedure: Coronary angiography;  Surgeon: Collin Harvey MD;  Location:  [facility];  Service: Cardiology;  Laterality: N/A;    COLONOSCOPY  02/2018    EYE SURGERY  2000    Cataracts both eyes    PROSTATE SURGERY          Social History     Tobacco Use   Smoking Status Never    Passive exposure: Past   Smokeless Tobacco Never        Patient Care Team:  Jamila Loco APRN as PCP - General (Nurse Practitioner)  Marlo Gamble MD as Surgeon (Neurosurgery)  Milton De Jesus MD as Consulting Physician (General Surgery)  PRADIP Gacria MD as Consulting Physician (Cardiology)    No Known Allergies       Current Outpatient Medications:     benzonatate (TESSALON) 200 MG capsule, Take 1 capsule by mouth 3 (Three) Times a Day As Needed for Cough for up to 30 doses., Disp: 30 capsule, Rfl: 0    cyanocobalamin (VITAMIN B-12) 1000 MCG tablet, Take 1 tablet by mouth Daily. (Patient taking differently: Take 0.5 tablets by mouth Daily.), Disp: , Rfl:     donepezil (Aricept) 10 MG tablet, Take 1 tablet by mouth Every Night., Disp: 90 tablet, Rfl: 3    ferrous sulfate 325 (65 FE) MG tablet, Take 1 tablet by mouth Daily With Breakfast., Disp: , Rfl:     lisinopril-hydrochlorothiazide (PRINZIDE,ZESTORETIC) 20-25 MG per tablet, Take 1 tablet by mouth Daily., Disp: , Rfl:     multivitamin with minerals tablet tablet, Take 1 tablet by mouth Daily., Disp: , Rfl:     NON FORMULARY, Apply  to the mouth or throat Daily. Tumeric, mag, vit d, Disp: , Rfl:     Omega-3 Fatty Acids (fish oil)  "1000 MG capsule capsule, Take  by mouth Daily With Breakfast., Disp: , Rfl:     Objective     Vitals:    02/21/25 1316   BP: 130/80   BP Location: Right arm   Patient Position: Sitting   Cuff Size: Adult   Pulse: 79   Temp: 96.9 °F (36.1 °C)   TempSrc: Temporal   SpO2: 98%   Weight: 74.3 kg (163 lb 12.8 oz)   Height: 172.7 cm (68\")        Wt Readings from Last 3 Encounters:   02/21/25 74.3 kg (163 lb 12.8 oz)   02/19/25 74.8 kg (165 lb)   02/14/25 75.3 kg (165 lb 14.4 oz)        BP Readings from Last 3 Encounters:   02/21/25 130/80   02/14/25 161/88   02/13/25 118/70        Physical Exam  Vital Signs  Blood pressure is normal. Oxygen saturation is at 98 percent.    Physical Exam  Vitals reviewed.   Constitutional:       General: He is not in acute distress.  HENT:      Head: Normocephalic and atraumatic.   Cardiovascular:      Rate and Rhythm: Normal rate and regular rhythm.   Pulmonary:      Effort: Pulmonary effort is normal.      Breath sounds: Normal breath sounds. No wheezing, rhonchi or rales.   Musculoskeletal:      Right lower leg: No edema.      Left lower leg: No edema.   Neurological:      General: No focal deficit present.      Mental Status: He is alert.   Psychiatric:         Thought Content: Thought content normal.                Result Review   The following data was reviewed by: CHAPO Lafleur on 02/21/2025:  [x]  Tests & Results  []  Hospitalization/Emergency Department/Urgent Care  [x]  Internal/External Consultant Notes       Assessment and Plan     Diagnoses and all orders for this visit:    1. Pneumonia of right upper lobe due to infectious organism (Primary)    2. Osteoarthritis of left knee, unspecified osteoarthritis type    3. Essential hypertension         Assessment & Plan  1.  Right upper lobe pneumonia.  The patient reports significant improvement in his symptoms after completing the prescribed medications, including a Z-Mikel and prednisone. He was also given inhalers, which he " has used up. The chest x-ray showed right upper lobe pneumonia, mild. His lungs are now clear, and he is not experiencing much coughing. No further inhalers are needed at this time. He is advised to call if he needs anything.    PROCEDURE  The patient received a gel injection in his knee from Dr. Fernandez 2 days ago.    Patient was given instructions and counseling regarding his condition or for health maintenance advice. Please see specific information pulled into the AVS if appropriate.     Follow Up   Return for Next scheduled follow up, or if symptoms worsen or fail to improve.    Patient or patient representative verbalized consent for the use of Ambient Listening during the visit with  CHAPO Lafleur for chart documentation. 2/21/2025  13:45 CHAPO Montilla

## 2025-02-21 ENCOUNTER — OFFICE VISIT (OUTPATIENT)
Dept: INTERNAL MEDICINE | Age: 89
End: 2025-02-21
Payer: MEDICARE

## 2025-02-21 VITALS
TEMPERATURE: 96.9 F | OXYGEN SATURATION: 98 % | HEIGHT: 68 IN | BODY MASS INDEX: 24.83 KG/M2 | DIASTOLIC BLOOD PRESSURE: 80 MMHG | SYSTOLIC BLOOD PRESSURE: 130 MMHG | HEART RATE: 79 BPM | WEIGHT: 163.8 LBS

## 2025-02-21 DIAGNOSIS — I10 ESSENTIAL HYPERTENSION: ICD-10-CM

## 2025-02-21 DIAGNOSIS — J18.9 PNEUMONIA OF RIGHT UPPER LOBE DUE TO INFECTIOUS ORGANISM: Primary | ICD-10-CM

## 2025-02-21 DIAGNOSIS — M17.12 OSTEOARTHRITIS OF LEFT KNEE, UNSPECIFIED OSTEOARTHRITIS TYPE: ICD-10-CM

## 2025-02-21 PROCEDURE — 1126F AMNT PAIN NOTED NONE PRSNT: CPT | Performed by: NURSE PRACTITIONER

## 2025-02-21 PROCEDURE — 99213 OFFICE O/P EST LOW 20 MIN: CPT | Performed by: NURSE PRACTITIONER

## 2025-02-21 PROCEDURE — 1160F RVW MEDS BY RX/DR IN RCRD: CPT | Performed by: NURSE PRACTITIONER

## 2025-02-21 PROCEDURE — 1159F MED LIST DOCD IN RCRD: CPT | Performed by: NURSE PRACTITIONER

## 2025-02-28 ENCOUNTER — TELEPHONE (OUTPATIENT)
Dept: INTERNAL MEDICINE | Age: 89
End: 2025-02-28
Payer: MEDICARE

## 2025-02-28 NOTE — TELEPHONE ENCOUNTER
The patient called in a few days ago regarding his urinary and stool symptoms. He called again today and stated that since he stopped his donepezil those symptoms have stopped. He states that ever since increasing the dose of the donepezil he had these symptoms, but he stopped taking it and everything got better. He wants top know if PCP recommends continuing medication, and if so if PCP can send in the lower dose of the medication to [facility] he would be okay with that. Also he wanted to know if he needs to continue taking his magnesium 2 tablets daily. He states he was taking this for his knee pain, but he recently got an injection from Dr. Fernandez and says this pain is okay now.

## 2025-03-02 RX ORDER — DONEPEZIL HYDROCHLORIDE 5 MG/1
5 TABLET, FILM COATED ORAL NIGHTLY
Qty: 90 TABLET | Refills: 3 | Status: SHIPPED | OUTPATIENT
Start: 2025-03-02

## 2025-03-03 NOTE — TELEPHONE ENCOUNTER
Spoke w/pt he voiced understanding.    Asked for clarification w/his Magnesium - Does Jamila mean to wait until sx return before starting it again?    Please advise.

## 2025-03-04 NOTE — TELEPHONE ENCOUNTER
Spoke w/pt he voiced understanding.    Stated he would hold his magnesium, will restart his donepezil 5 mg, would try every other day for now and let us know how he does.

## 2025-03-04 NOTE — TELEPHONE ENCOUNTER
Called and LM for the patient to call back.       Restart one thing at a time. Hold the magnesium for now.       Hub may relay

## 2025-06-11 ENCOUNTER — PATIENT ROUNDING (BHMG ONLY) (OUTPATIENT)
Dept: URGENT CARE | Facility: CLINIC | Age: 89
End: 2025-06-11
Payer: OTHER GOVERNMENT

## 2025-06-11 NOTE — ED NOTES
Thank you for letting us care for you in your recent visit to our urgent care center. We would love to hear about your experience with us. Was this the first time you have visited our location?    We’re always looking for ways to make our patients’ experiences even better. Do you have any recommendations on ways we may improve?    I appreciate you taking the time to respond. Please be on the lookout for a survey about your recent visit from [employer] via text or email. We would greatly appreciate if you could fill that out and turn it back in. We want your voice to be heard and we value your feedback.  Thank you for choosing [facility] for your healthcare needs.

## 2025-06-18 ENCOUNTER — OFFICE VISIT (OUTPATIENT)
Dept: INTERNAL MEDICINE | Age: 89
End: 2025-06-18
Payer: MEDICARE

## 2025-06-18 VITALS
SYSTOLIC BLOOD PRESSURE: 98 MMHG | DIASTOLIC BLOOD PRESSURE: 50 MMHG | HEART RATE: 75 BPM | WEIGHT: 156 LBS | RESPIRATION RATE: 18 BRPM | BODY MASS INDEX: 23.64 KG/M2 | HEIGHT: 68 IN | TEMPERATURE: 97.5 F | OXYGEN SATURATION: 98 %

## 2025-06-18 DIAGNOSIS — L03.032 CELLULITIS OF TOE OF LEFT FOOT: Primary | ICD-10-CM

## 2025-06-18 DIAGNOSIS — L60.9 NAIL ABNORMALITIES: ICD-10-CM

## 2025-06-18 PROCEDURE — 1159F MED LIST DOCD IN RCRD: CPT | Performed by: NURSE PRACTITIONER

## 2025-06-18 PROCEDURE — 1160F RVW MEDS BY RX/DR IN RCRD: CPT | Performed by: NURSE PRACTITIONER

## 2025-06-18 PROCEDURE — 99214 OFFICE O/P EST MOD 30 MIN: CPT | Performed by: NURSE PRACTITIONER

## 2025-06-18 PROCEDURE — 1126F AMNT PAIN NOTED NONE PRSNT: CPT | Performed by: NURSE PRACTITIONER

## 2025-06-18 RX ORDER — CEPHALEXIN 500 MG/1
500 CAPSULE ORAL 2 TIMES DAILY
Qty: 14 CAPSULE | Refills: 0 | Status: SHIPPED | OUTPATIENT
Start: 2025-06-18 | End: 2025-06-25

## 2025-06-18 NOTE — PROGRESS NOTES
Answers submitted by the patient for this visit:  Problem not listed (Submitted on 6/17/2025)  Chief Complaint: Other medical problem  Reason for appointment: Follow up from urgent care visit swollen toe  abdominal pain: No  anorexia: No  joint pain: Yes  change in stool: No  chest pain: No  chills: No  nasal congestion: Yes  cough: No  fatigue: No  fever: No  headaches: No  myalgias: Yes  nausea: No  neck pain: Yes  numbness: No  rash: No  sore throat: No  swollen glands: No  dysuria: No  vertigo: No  visual change: No  vomiting: No  weakness: No  Onset: in the past 7 days  Chronicity: new  Chief Complaint  Primary Care Follow-Up (89 year old male here today for a follow up on his cellulitis on his toe. Pt states he also had a bed sore on his bottom that he would like to talk about )    Subjective      History of Present Illness  The patient is an 89-year-old male who presents for evaluation of toe cellulitis and pressure ulcer.    He sought medical attention at an urgent care facility due to severe pain in his toe on 6/10/25, which was initially suspected to be gout but later diagnosed as cellulitis. He describes the pain as the most intense he has ever experienced in his toe. The affected area appears slightly improved, with no tenderness upon palpation. He reports that the onset of symptoms was preceded by a sensation akin to an electric shock in his feet during sleep, followed by redness and pain the subsequent morning. He attempted to schedule an appointment here but was unable to secure one within 4 days, prompting his visit to urgent care. He was prescribed Keflex 500 mg twice daily on 06/10/2025, which he completed without any adverse effects. He is considering resuming the medication. He also mentions that his foot became red and that he needs to maintain better foot hygiene. His last pedicure was over 6 months ago, and he does not have any ingrown toenails. He has not previously consulted with podiatry.  His other toes exhibit similar nail characteristics, being thick and curved.    He reports a long-standing bed sore on his buttocks, which is not open but causes discomfort when sitting on two pillows for extended periods. The sore does not interfere with his sleep or lying down. He prefers to sleep on his back and uses pillows for support while driving, watching TV, and using the computer. He notes that wearing long school bus driving pants seems to irritate the sore. He has been applying zinc oxide gentamicin cream daily, which was prescribed by a dermatologist almost a year ago. He has also tried three different over-the-counter products, including Medihoney wound and burn dressing, Manuka honey ointment, and PINXAV, which was sent by his granddaughter from [employer]. Despite these treatments, the sore has not worsened but remains present.    He reports a general feeling of malaise and fatigue, despite maintaining a regular exercise routine that includes stretch bands and weights. He acknowledges that his walking activity is less than optimal, but he compensates by taking numerous steps throughout the day.    Past Medical History:   Diagnosis Date    Arthritis     Arthritis of neck     Back problem     Back Decompression 1990    Breathing problem     patient had breathing problem 2 years ago    Chronic fatigue     Edema     Enlarged prostate     Essential hypertension 06/08/2020    History of echocardiogram 05/29/2020    EF = 66.0%, borderline concentric hypertrophy, diastolic dysfunction (grade I), moderate calcification of the aortic valve, moderate calcification of the aortic valve.Trace-to-mild aortic valve regurgitation, mild to moderate aortic stenosis, Mild MR/TR/SD    HL (hearing loss) 2000    Hearing not a problem    Hyperlipidemia, unspecified     Hypothyroidism, unspecified     Iron deficiency anemia, unspecified     Lung nodule     Neck injury 2021    Prostate disorder     tuna of prostate Dr. Torre  [id number]    Unilateral primary osteoarthritis, left knee     Vitamin D deficiency, unspecified         Past Surgical History:   Procedure Laterality Date    BACK SURGERY      back fusion 1992    CARDIAC CATHETERIZATION N/A 07/21/2020    Procedure: Right and Left Heart Cath;  Surgeon: Collin Harvey MD;  Location:  [facility];  Service: Cardiology;  Laterality: N/A;    CARDIAC CATHETERIZATION N/A 07/21/2020    Procedure: Left ventriculography;  Surgeon: Collin Harvey MD;  Location:  [facility];  Service: Cardiology;  Laterality: N/A;    CARDIAC CATHETERIZATION N/A 07/21/2020    Procedure: Coronary angiography;  Surgeon: Collin Harvey MD;  Location:  [facility];  Service: Cardiology;  Laterality: N/A;    COLONOSCOPY  02/2018    EYE SURGERY  2000    Cataracts both eyes    PROSTATE SURGERY          Social History     Tobacco Use   Smoking Status Never    Passive exposure: Past   Smokeless Tobacco Never        Patient Care Team:  Jamila Loco APRN as PCP - General (Nurse Practitioner)  Marlo Gamble MD as Surgeon (Neurosurgery)  Milton De Jesus MD as Consulting Physician (General Surgery)  PRADIP Garcia MD as Consulting Physician (Cardiology)  Russell Fernandez MD as Surgeon (Orthopedic Surgery)    No Known Allergies       Current Outpatient Medications:     cephalexin (KEFLEX) 500 MG capsule, Take 1 capsule by mouth 2 (Two) Times a Day for 7 days., Disp: 14 capsule, Rfl: 0    cyanocobalamin (VITAMIN B-12) 1000 MCG tablet, Take 1 tablet by mouth Daily. (Patient taking differently: Take 0.5 tablets by mouth Daily.), Disp: , Rfl:     ferrous sulfate 325 (65 FE) MG tablet, Take 1 tablet by mouth Daily With Breakfast., Disp: , Rfl:     lisinopril-hydrochlorothiazide (PRINZIDE,ZESTORETIC) 20-25 MG per tablet, Take 1 tablet by mouth Daily., Disp: , Rfl:     Magnesium Chloride (MAGNESIUM DR PO), Take 1 tablet by mouth Daily., Disp: , Rfl:     Misc Natural  "Products ([employer] Manuka Honey) cream, Apply  topically., Disp: , Rfl:     multivitamin with minerals tablet tablet, Take 1 tablet by mouth Daily., Disp: , Rfl:     Omega-3 Fatty Acids (fish oil) 1000 MG capsule capsule, Take  by mouth Daily With Breakfast., Disp: , Rfl:     NON FORMULARY, Apply  to the mouth or throat Daily. Tumeric, mag, vit d (Patient not taking: Reported on 6/18/2025), Disp: , Rfl:     Zinc Oxide 30 % ointment, Apply 1 Application topically 2 (Two) Times a Day As Needed (skin irritation)., Disp: 113 g, Rfl: 5    Objective     Vitals:    06/18/25 1044   BP: 98/50   BP Location: Left arm   Patient Position: Sitting   Cuff Size: Large Adult   Pulse: 75   Resp: 18   Temp: 97.5 °F (36.4 °C)   TempSrc: Temporal   SpO2: 98%   Weight: 70.8 kg (156 lb)   Height: 172.7 cm (67.99\")        Wt Readings from Last 3 Encounters:   06/18/25 70.8 kg (156 lb)   06/10/25 73.5 kg (162 lb)   02/21/25 74.3 kg (163 lb 12.8 oz)        BP Readings from Last 3 Encounters:   06/18/25 98/50   06/10/25 114/53   02/21/25 130/80        Physical Exam  Extremities: Left toe is mildly erythematous and swollen. Toenails are thick and discolored.          Result Review   The following data was reviewed by: CHAPO Lafleur on 06/18/2025:  [x]  Tests & Results  [x]  Hospitalization/Emergency Department/Urgent Care  []  Internal/External Consultant Notes       Assessment and Plan     Diagnoses and all orders for this visit:    1. Cellulitis of toe of left foot (Primary)  -     cephalexin (KEFLEX) 500 MG capsule; Take 1 capsule by mouth 2 (Two) Times a Day for 7 days.  Dispense: 14 capsule; Refill: 0    2. Nail abnormalities  -     Ambulatory Referral to Podiatry    Other orders  -     Zinc Oxide 30 % ointment; Apply 1 Application topically 2 (Two) Times a Day As Needed (skin irritation).  Dispense: 113 g; Refill: 5       Assessment & Plan  1. Cellulitis of the toe.  - Improvement noted after antibiotic treatment, though residual " redness and swelling persist.  - Increased pain and limited mobility initially, now less sore to touch.  - Discussion regarding potential nail bed infection; referral to podiatry for further evaluation.  - Keflex 500 mg twice a day prescribed for an additional week.    2. Pressure ulcer.  - Advised to avoid exerting pressure on the affected area.  - Persistent irritation despite use of various creams; not open.  - Suggested use of padded bandage; consultation with pharmacist recommended.  - Ongoing management with pillows to alleviate pressure.     BMI is within normal parameters. No other follow-up for BMI required.     Patient was given instructions and counseling regarding his condition or for health maintenance advice. Please see specific information pulled into the AVS if appropriate.     Follow Up   Return for Next scheduled follow up, or if symptoms worsen or fail to improve.    Patient or patient representative verbalized consent for the use of Ambient Listening during the visit with  CHAPO Lafleur for chart documentation. 6/18/2025  11:21 EDT    CHAPO Lafleur

## 2025-07-14 ENCOUNTER — TELEPHONE (OUTPATIENT)
Dept: CARDIOLOGY | Facility: CLINIC | Age: 89
End: 2025-07-14
Payer: MEDICARE

## 2025-07-14 NOTE — TELEPHONE ENCOUNTER
Caller: Payam Owens    Relationship to patient: Self    Best call back number: [phone] OR WIFE [phone]    Chief complaint: SOB ON EXERTION     Type of visit: FU     Requested date: ASKING TO BE SEEN WITH HIS WIFE DURING HER APPT ON 8.8.25 AT 12:45PM     Additional notes: ASKING TO BE SEEN WITH HIS WIFE DURING HER APPT, PLEASE ADVISE IF THIS IS SOMETHING DR PACE CAN DO OR NOT AND CALL PT BACK TO DISCUSS.

## 2025-07-14 NOTE — TELEPHONE ENCOUNTER
KIM patient. Patient reports shortness of breath with exertion that has gotten worse over the last month. States has had shortness of breath x 3 years. Denies any swelling, chest pain, or weight gain. -140/70's, HR 60-70's. Patient compliant with HCTZ 12.5 mg daily prn. Patient request appointment with wife. Appointment scheduled 8/15.    Advised to limit salt and fluid intake. Advised to weigh daily. Advised to contact office if symptoms progress.

## 2025-07-22 NOTE — PROGRESS NOTES
Chief Complaint  Back Pain and Rib Pain (89 year old male here today for back and rib pain x1 week. Pt states that he hasn't had any injury that he is aware of. )    Subjective      History of Present Illness  The patient is an 89-year-old male who presents for an acute visit.    He reports experiencing back pain, specifically below his right shoulder blade, which he believes may be due to a recent activity. The pain is severe enough to cause discomfort during breathing. He recalls splitting wood and playing 18 holes of golf, after which the pain intensified. Despite resting for several days, the pain persisted. He has been managing the pain with ibuprofen, which provides some relief, and applying heat overnight. He also mentions a history of back surgery and arthritis but does not believe these are related to his current pain. He has previously taken muscle relaxers.    He has been having problems with breathing again. He has an appointment with Dr. Garcia on 08/15/2025 to see what is going on. He is losing his breath now just climbing a little hill in the yard. He had 2 catheterizations, one in 2020 and another in 2022, and neither time did they see any reason to do anything.    Social History:  Hobbies: Golf    PAST SURGICAL HISTORY:  Back surgery       Past Medical History:   Diagnosis Date    Arthritis     Arthritis of neck     Back problem     Back Decompression 1990    Breathing problem     patient had breathing problem 2 years ago    Chronic fatigue     Edema     Enlarged prostate     Essential hypertension 06/08/2020    History of echocardiogram 05/29/2020    EF = 66.0%, borderline concentric hypertrophy, diastolic dysfunction (grade I), moderate calcification of the aortic valve, moderate calcification of the aortic valve.Trace-to-mild aortic valve regurgitation, mild to moderate aortic stenosis, Mild MR/TR/FL    HL (hearing loss) 2000    Hearing not a problem    Hyperlipidemia, unspecified      Hypothyroidism, unspecified     Iron deficiency anemia, unspecified     Lung nodule     Neck injury 2021    Prostate disorder     tuna of prostate Dr. Torre [id number]    Unilateral primary osteoarthritis, left knee     Vitamin D deficiency, unspecified         Past Surgical History:   Procedure Laterality Date    BACK SURGERY      back fusion 1992    CARDIAC CATHETERIZATION N/A 07/21/2020    Procedure: Right and Left Heart Cath;  Surgeon: Collin Harvey MD;  Location:  [facility];  Service: Cardiology;  Laterality: N/A;    CARDIAC CATHETERIZATION N/A 07/21/2020    Procedure: Left ventriculography;  Surgeon: Collin Harvey MD;  Location:  [facility];  Service: Cardiology;  Laterality: N/A;    CARDIAC CATHETERIZATION N/A 07/21/2020    Procedure: Coronary angiography;  Surgeon: Collin Harvey MD;  Location:  [facility];  Service: Cardiology;  Laterality: N/A;    COLONOSCOPY  02/2018    EYE SURGERY  2000    Cataracts both eyes    PROSTATE SURGERY          Social History     Tobacco Use   Smoking Status Never    Passive exposure: Past   Smokeless Tobacco Never        Patient Care Team:  Jamila Loco APRN as PCP - General (Nurse Practitioner)  aMrlo Gamble MD as Surgeon (Neurosurgery)  Milton De Jesus MD as Consulting Physician (General Surgery)  PRADIP Garcia MD as Consulting Physician (Cardiology)  Russell Fernandez MD as Surgeon (Orthopedic Surgery)    No Known Allergies       Current Outpatient Medications:     cyanocobalamin (VITAMIN B-12) 1000 MCG tablet, Take 1 tablet by mouth Daily. (Patient taking differently: Take 0.5 tablets by mouth Daily.), Disp: , Rfl:     ferrous sulfate 325 (65 FE) MG tablet, Take 1 tablet by mouth Daily With Breakfast., Disp: , Rfl:     lisinopril-hydrochlorothiazide (PRINZIDE,ZESTORETIC) 20-25 MG per tablet, Take 0.5 tablets by mouth Daily., Disp: , Rfl:     Magnesium Chloride (MAGNESIUM DR PO), Take 1 tablet by mouth  "Daily., Disp: , Rfl:     [facility] [place] [facility] [employer] ([employer]) cream, Apply  topically., Disp: , Rfl:     multivitamin with minerals tablet tablet, Take 1 tablet by mouth Daily., Disp: , Rfl:     Omega-3 Fatty Acids (fish oil) 1000 MG capsule capsule, Take  by mouth Daily With Breakfast., Disp: , Rfl:     Zinc Oxide 30 % ointment, Apply 1 Application topically 2 (Two) Times a Day As Needed (skin irritation)., Disp: 113 g, Rfl: 5    meloxicam (Mobic) 7.5 MG tablet, Take 1 tablet by mouth Daily. Take with food for pain and inflammation., Disp: 14 tablet, Rfl: 0    NON FORMULARY, Apply  to the mouth or throat Daily. Tumeric, mag, vit d (Patient not taking: Reported on 7/23/2025), Disp: , Rfl:     tiZANidine (ZANAFLEX) 2 MG tablet, Take 1 tablet by mouth Every 8 (Eight) Hours As Needed for Muscle Spasms (back pain)., Disp: 20 tablet, Rfl: 0  No current facility-administered medications for this visit.    Objective     Vitals:    07/23/25 1255   BP: 112/52   BP Location: Left arm   Patient Position: Sitting   Cuff Size: Large Adult   Pulse: 69   Resp: 16   Temp: 98.6 °F (37 °C)   TempSrc: Temporal   SpO2: 96%   Weight: 70.3 kg (155 lb)   Height: 172.7 cm (67.99\")        Wt Readings from Last 3 Encounters:   07/23/25 70.3 kg (155 lb)   06/18/25 70.8 kg (156 lb)   06/10/25 73.5 kg (162 lb)        BP Readings from Last 3 Encounters:   07/23/25 112/52   06/18/25 98/50   06/10/25 114/53        Physical Exam  Respiratory: Diminished breath sounds on the right side.  Cardiovascular: Regular rate and rhythm, no murmurs, rubs, or gallops.  Musculoskeletal: Tenderness in the right mid back muscle.        Result Review   The following data was reviewed by: CHAPO Lafleur on 07/23/2025:  [x]  Tests & Results  []  Hospitalization/Emergency Department/Urgent Care  []  Internal/External Consultant Notes       Assessment and Plan     Diagnoses and all orders for this visit:    1. Acute thoracic back pain, unspecified back pain " laterality (Primary)  -     XR Spine Thoracic 3 View; Future  -     Discontinue: ketorolac (TORADOL) injection 15 mg  -     ketorolac (TORADOL) injection 15 mg    2. Dyspnea, unspecified type  -     XR Chest PA & Lateral; Future    3. Essential hypertension    4. Cough, unspecified type    Other orders  -     tiZANidine (ZANAFLEX) 2 MG tablet; Take 1 tablet by mouth Every 8 (Eight) Hours As Needed for Muscle Spasms (back pain).  Dispense: 20 tablet; Refill: 0  -     meloxicam (Mobic) 7.5 MG tablet; Take 1 tablet by mouth Daily. Take with food for pain and inflammation.  Dispense: 14 tablet; Refill: 0         Assessment & Plan  1. Back pain.  - The back pain is likely due to a muscle strain, possibly exacerbated by spasms.  - Physical examination revealed tenderness in the right mid-back area, with diminished air movement on the right side.  - A chest x-ray and back x-ray will be ordered to rule out any underlying conditions.  - A Toradol injection will be administered today for pain and inflammation management. A muscle relaxant and anti-inflammatory medication will also be prescribed to take for short period of time.    2. Shortness of breath.  - The shortness of breath has been ongoing for about a month.  - Physical examination revealed diminished air movement on the right side.  - A chest x-ray will be ordered to investigate further.  - He is advised to continue his scheduled appointment with Dr. Culevr, the cardiologist, on 08/15/2025.    3. Blood pressure management.  - His blood pressure readings have been consistently low, with systolic values below 100.  - He is currently taking half a pill of lisinopril hydrochlorothiazide.  - He is advised to discontinue his blood pressure medication if his systolic blood pressure falls below 100.  - Blood pressure monitoring should continue.    4. Cough.  - The cough could be sinus related.  - No fever or chills reported.  - Oxygen saturation is 96%, and heart sounds are  regular and normal.  - A chest x-ray will be ordered to rule out any underlying conditions.    PROCEDURE  Procedure: Toradol 15 mg injection  - Procedural Discussion: Discussed administering Toradol injection for pain relief and inflammation. Explained that Toradol is like a super ibuprofen and is used for muscle strain and inflammation. No allergies to medications were noted.  - Medication: Toradol  - Technique: Injection administered into the muscle.       BMI is within normal parameters. No other follow-up for BMI required.     Patient was given instructions and counseling regarding his condition or for health maintenance advice. Please see specific information pulled into the AVS if appropriate.     Follow Up   Return for Next scheduled follow up, or if symptoms worsen or fail to improve.    Patient or patient representative verbalized consent for the use of Ambient Listening during the visit with  CHAPO Lafleur for chart documentation. 7/23/2025  13:11 EDT    CHAPO Lafleur

## 2025-07-23 ENCOUNTER — HOSPITAL ENCOUNTER (OUTPATIENT)
Dept: GENERAL RADIOLOGY | Facility: HOSPITAL | Age: 89
Discharge: HOME | End: 2025-07-23
Payer: MEDICARE

## 2025-07-23 ENCOUNTER — OFFICE VISIT (OUTPATIENT)
Dept: INTERNAL MEDICINE | Age: 89
End: 2025-07-23
Payer: MEDICARE

## 2025-07-23 VITALS
WEIGHT: 155 LBS | TEMPERATURE: 98.6 F | BODY MASS INDEX: 23.49 KG/M2 | HEIGHT: 68 IN | DIASTOLIC BLOOD PRESSURE: 52 MMHG | HEART RATE: 69 BPM | RESPIRATION RATE: 16 BRPM | OXYGEN SATURATION: 96 % | SYSTOLIC BLOOD PRESSURE: 112 MMHG

## 2025-07-23 DIAGNOSIS — M54.6 ACUTE THORACIC BACK PAIN, UNSPECIFIED BACK PAIN LATERALITY: Primary | ICD-10-CM

## 2025-07-23 DIAGNOSIS — R06.00 DYSPNEA, UNSPECIFIED TYPE: ICD-10-CM

## 2025-07-23 DIAGNOSIS — R05.9 COUGH, UNSPECIFIED TYPE: ICD-10-CM

## 2025-07-23 DIAGNOSIS — I10 ESSENTIAL HYPERTENSION: ICD-10-CM

## 2025-07-23 DIAGNOSIS — M54.6 ACUTE THORACIC BACK PAIN, UNSPECIFIED BACK PAIN LATERALITY: ICD-10-CM

## 2025-07-23 PROCEDURE — 72072 X-RAY EXAM THORAC SPINE 3VWS: CPT

## 2025-07-23 PROCEDURE — 71046 X-RAY EXAM CHEST 2 VIEWS: CPT

## 2025-07-23 RX ORDER — KETOROLAC TROMETHAMINE 15 MG/ML
15 INJECTION, SOLUTION INTRAMUSCULAR; INTRAVENOUS EVERY 6 HOURS PRN
Status: DISCONTINUED | OUTPATIENT
Start: 2025-07-23 | End: 2025-07-23

## 2025-07-23 RX ORDER — TIZANIDINE 2 MG/1
2 TABLET ORAL EVERY 8 HOURS PRN
Qty: 20 TABLET | Refills: 0 | Status: SHIPPED | OUTPATIENT
Start: 2025-07-23

## 2025-07-23 RX ORDER — KETOROLAC TROMETHAMINE 15 MG/ML
15 INJECTION, SOLUTION INTRAMUSCULAR; INTRAVENOUS ONCE
Status: COMPLETED | OUTPATIENT
Start: 2025-07-23 | End: 2025-07-23

## 2025-07-23 RX ORDER — MELOXICAM 7.5 MG/1
7.5 TABLET ORAL DAILY
Qty: 14 TABLET | Refills: 0 | Status: SHIPPED | OUTPATIENT
Start: 2025-07-23

## 2025-07-23 RX ADMIN — KETOROLAC TROMETHAMINE 15 MG: 15 INJECTION, SOLUTION INTRAMUSCULAR; INTRAVENOUS at 13:42

## 2025-07-28 RX ORDER — LISINOPRIL AND HYDROCHLOROTHIAZIDE 10; 12.5 MG/1; MG/1
1 TABLET ORAL DAILY
Qty: 90 TABLET | Refills: 3 | Status: SHIPPED | OUTPATIENT
Start: 2025-07-28

## 2025-07-28 RX ORDER — LISINOPRIL AND HYDROCHLOROTHIAZIDE 20; 25 MG/1; MG/1
1 TABLET ORAL DAILY
Qty: 90 TABLET | Refills: 1 | OUTPATIENT
Start: 2025-07-28

## 2025-08-15 ENCOUNTER — OFFICE VISIT (OUTPATIENT)
Dept: CARDIOLOGY | Facility: CLINIC | Age: 89
End: 2025-08-15
Payer: MEDICARE

## 2025-08-15 VITALS
DIASTOLIC BLOOD PRESSURE: 50 MMHG | BODY MASS INDEX: 22.73 KG/M2 | HEART RATE: 65 BPM | HEIGHT: 68 IN | WEIGHT: 150 LBS | SYSTOLIC BLOOD PRESSURE: 102 MMHG

## 2025-08-15 DIAGNOSIS — R06.09 DYSPNEA ON EXERTION: Primary | ICD-10-CM

## 2025-08-15 DIAGNOSIS — I10 HYPERTENSION, ESSENTIAL: ICD-10-CM

## 2025-08-15 PROCEDURE — 99214 OFFICE O/P EST MOD 30 MIN: CPT | Performed by: INTERNAL MEDICINE

## 2025-08-15 PROCEDURE — 1160F RVW MEDS BY RX/DR IN RCRD: CPT | Performed by: INTERNAL MEDICINE

## 2025-08-15 PROCEDURE — 1159F MED LIST DOCD IN RCRD: CPT | Performed by: INTERNAL MEDICINE

## (undated) DEVICE — RADIFOCUS GLIDEWIRE: Brand: GLIDEWIRE

## (undated) DEVICE — GW INQWIRE FC PTFE STR .035IN 150

## (undated) DEVICE — HI-TORQUE BALANCE MIDDLEWEIGHT GUIDE WIRE .014 STRAIGHT TIP 3.0 CM X 190 CM: Brand: HI-TORQUE BALANCE MIDDLEWEIGHT

## (undated) DEVICE — Device

## (undated) DEVICE — CATH DIAG IMPULSE FR4 5F 100CM

## (undated) DEVICE — EACH LANGSTON DUAL LUMEN CATHETER IS INDICATED FOR DELIVERY OF CONTRAST MEDIUM IN ANGIOGRAPHIC STUDIES AND FOR SIMULTANEOUS PRESSURE MEASUREMENT FROM TWO SITES. THIS TYPE OF PRESSURE MEASUREMENT IS USEFUL IN DETERMINING TRANSVALVULAR, INTRAVASCULAR AND INTRAVENTRICULAR PRESSURE GRADIENTS.: Brand: LANGSTON® DUAL LUMEN CATHETER

## (undated) DEVICE — SWAN-GANZ TRUE SIZE THERMODILUTION "S" TIP: Brand: SWAN-GANZ TRUE SIZE

## (undated) DEVICE — PK CATH CARD 40

## (undated) DEVICE — TR BAND RADIAL ARTERY COMPRESSION DEVICE: Brand: TR BAND

## (undated) DEVICE — INTRO SHEATH ART/FEM ENGAGE .035 7F12CM

## (undated) DEVICE — GLIDESHEATH SLENDER STAINLESS STEEL KIT: Brand: GLIDESHEATH SLENDER

## (undated) DEVICE — GW HITORQUE/BAL MID/WT J W/HCOAT .014 3X190CM

## (undated) DEVICE — CATH DIAG IMPULSE FL3.5 5F 100CM

## (undated) DEVICE — GLIDESHEATH BASIC HYDROPHILIC COATED INTRODUCER SHEATH: Brand: GLIDESHEATH

## (undated) DEVICE — GW EMR FIX EXCHG J STD .035 3MM 260CM

## (undated) DEVICE — BALN PRESS WEDGE 5F 110CM

## (undated) DEVICE — KT MANIFLD CARDIAC

## (undated) DEVICE — SKIN PREP TRAY W/CHG: Brand: MEDLINE INDUSTRIES, INC.